# Patient Record
Sex: MALE | Race: WHITE | Employment: FULL TIME | URBAN - METROPOLITAN AREA
[De-identification: names, ages, dates, MRNs, and addresses within clinical notes are randomized per-mention and may not be internally consistent; named-entity substitution may affect disease eponyms.]

---

## 2017-01-27 ENCOUNTER — OFFICE VISIT (OUTPATIENT)
Dept: FAMILY MEDICINE CLINIC | Facility: CLINIC | Age: 57
End: 2017-01-27

## 2017-01-27 VITALS
RESPIRATION RATE: 14 BRPM | OXYGEN SATURATION: 98 % | TEMPERATURE: 98 F | BODY MASS INDEX: 32 KG/M2 | HEART RATE: 68 BPM | WEIGHT: 236.38 LBS | DIASTOLIC BLOOD PRESSURE: 72 MMHG | SYSTOLIC BLOOD PRESSURE: 122 MMHG

## 2017-01-27 DIAGNOSIS — J31.0 RHINOSINUSITIS: Primary | ICD-10-CM

## 2017-01-27 DIAGNOSIS — J32.9 RHINOSINUSITIS: Primary | ICD-10-CM

## 2017-01-27 DIAGNOSIS — R43.0 ANOSMIA: ICD-10-CM

## 2017-01-27 PROCEDURE — 99214 OFFICE O/P EST MOD 30 MIN: CPT | Performed by: FAMILY MEDICINE

## 2017-01-27 RX ORDER — METHYLPREDNISOLONE 4 MG/1
TABLET ORAL
Qty: 1 KIT | Refills: 0 | Status: SHIPPED | OUTPATIENT
Start: 2017-01-27 | End: 2017-05-09

## 2017-01-27 RX ORDER — FLUTICASONE PROPIONATE 50 MCG
2 SPRAY, SUSPENSION (ML) NASAL DAILY
Qty: 1 BOTTLE | Refills: 1 | Status: SHIPPED | OUTPATIENT
Start: 2017-01-27 | End: 2017-08-01 | Stop reason: ALTCHOICE

## 2017-01-27 NOTE — PROGRESS NOTES
CC: congestion    HPI:     Location nasal  Quality pressure, drainage  Severity moderate  Duration 6 weeks  Timing constant  Context has been on treatment including abx previously  Associated symptoms tired    ROS: no fever or chills, no vomiting or diarrh

## 2017-04-28 RX ORDER — LISINOPRIL 40 MG/1
TABLET ORAL
Qty: 90 TABLET | Refills: 1 | Status: SHIPPED | OUTPATIENT
Start: 2017-04-28 | End: 2017-08-01

## 2017-04-28 NOTE — TELEPHONE ENCOUNTER
LOV: 1/27/17 for rhinosinusitis  BP at time of visit: 122/72      LISINOPRIL 40 MG Oral Tab 90 tablet 1 4/9/2016      Sig :  TAKE 1 TABLET BY MOUTH ONCE DAILY.       Patient taking differently :  TAKE 1 TABLET BY MOUTH ONCE DAILY.    Taking 20 mg daily

## 2017-05-09 PROCEDURE — 86480 TB TEST CELL IMMUN MEASURE: CPT | Performed by: DERMATOLOGY

## 2017-07-24 ENCOUNTER — NURSE ONLY (OUTPATIENT)
Dept: FAMILY MEDICINE CLINIC | Facility: CLINIC | Age: 57
End: 2017-07-24

## 2017-07-24 ENCOUNTER — TELEPHONE (OUTPATIENT)
Dept: FAMILY MEDICINE CLINIC | Facility: CLINIC | Age: 57
End: 2017-07-24

## 2017-07-24 DIAGNOSIS — Z00.00 ANNUAL PHYSICAL EXAM: Primary | ICD-10-CM

## 2017-07-24 DIAGNOSIS — Z00.00 GENERAL MEDICAL EXAM: ICD-10-CM

## 2017-07-24 DIAGNOSIS — Z00.00 GENERAL MEDICAL EXAM: Primary | ICD-10-CM

## 2017-07-24 PROCEDURE — 36415 COLL VENOUS BLD VENIPUNCTURE: CPT | Performed by: FAMILY MEDICINE

## 2017-07-24 PROCEDURE — 83036 HEMOGLOBIN GLYCOSYLATED A1C: CPT | Performed by: FAMILY MEDICINE

## 2017-07-24 PROCEDURE — 80050 GENERAL HEALTH PANEL: CPT | Performed by: FAMILY MEDICINE

## 2017-07-24 PROCEDURE — 84153 ASSAY OF PSA TOTAL: CPT | Performed by: FAMILY MEDICINE

## 2017-07-24 PROCEDURE — 80061 LIPID PANEL: CPT | Performed by: FAMILY MEDICINE

## 2017-07-24 NOTE — TELEPHONE ENCOUNTER
PT HAS AN APPOINTMENT NEXT Tuesday FOR FOLLOW Up    And wants to know if he can come before to have labs done

## 2017-07-24 NOTE — TELEPHONE ENCOUNTER
Patient advised lab orders placed. Nurse appointment scheduled.   Future Appointments  Date Time Provider Juan Moore   8/1/2017 5:15 PM Eugene Garcia DO EMGOSW [facility] [place]   8/8/2017 4:00 PM [facility] DERM SUTURE REMOVAL [facility]    11/7/2017 4:

## 2017-07-25 LAB
ALBUMIN SERPL-MCNC: 3.9 G/DL (ref 3.5–4.8)
ALP LIVER SERPL-CCNC: 52 U/L (ref 45–117)
ALT SERPL-CCNC: 30 U/L (ref 17–63)
AST SERPL-CCNC: 22 U/L (ref 15–41)
BASOPHILS # BLD AUTO: 0.04 X10(3) UL (ref 0–0.1)
BASOPHILS NFR BLD AUTO: 0.5 %
BILIRUB SERPL-MCNC: 1.2 MG/DL (ref 0.1–2)
BUN BLD-MCNC: 10 MG/DL (ref 8–20)
CALCIUM BLD-MCNC: 8.6 MG/DL (ref 8.3–10.3)
CHLORIDE: 106 MMOL/L (ref 101–111)
CHOLEST SMN-MCNC: 101 MG/DL (ref ?–200)
CO2: 25 MMOL/L (ref 22–32)
COMPLEXED PSA SERPL-MCNC: 1.82 NG/ML (ref 0.01–4)
CREAT BLD-MCNC: 1 MG/DL (ref 0.7–1.3)
CREAT UR-SCNC: 56.5 MG/DL
EOSINOPHIL # BLD AUTO: 0.06 X10(3) UL (ref 0–0.3)
EOSINOPHIL NFR BLD AUTO: 0.8 %
ERYTHROCYTE [DISTWIDTH] IN BLOOD BY AUTOMATED COUNT: 12.3 % (ref 11.5–16)
EST. AVERAGE GLUCOSE BLD GHB EST-MCNC: 111 MG/DL (ref 68–126)
GLUCOSE BLD-MCNC: 72 MG/DL (ref 70–99)
HBA1C MFR BLD HPLC: 5.5 % (ref ?–5.7)
HCT VFR BLD AUTO: 37.9 % (ref 37–53)
HDLC SERPL-MCNC: 29 MG/DL (ref 45–?)
HDLC SERPL: 3.48 {RATIO} (ref ?–4.97)
HGB BLD-MCNC: 12.8 G/DL (ref 13–17)
IMMATURE GRANULOCYTE COUNT: 0.04 X10(3) UL (ref 0–1)
IMMATURE GRANULOCYTE RATIO %: 0.5 %
LDLC SERPL CALC-MCNC: 60 MG/DL (ref ?–130)
LDLC SERPL-MCNC: 12 MG/DL (ref 5–40)
LYMPHOCYTES # BLD AUTO: 2.03 X10(3) UL (ref 0.9–4)
LYMPHOCYTES NFR BLD AUTO: 26.7 %
M PROTEIN MFR SERPL ELPH: 7 G/DL (ref 6.1–8.3)
MCH RBC QN AUTO: 28.1 PG (ref 27–33.2)
MCHC RBC AUTO-ENTMCNC: 33.8 G/DL (ref 31–37)
MCV RBC AUTO: 83.3 FL (ref 80–99)
MICROALBUMIN UR-MCNC: 0.8 MG/DL
MICROALBUMIN/CREAT 24H UR-RTO: 14.2 UG/MG (ref ?–30)
MONOCYTES # BLD AUTO: 0.63 X10(3) UL (ref 0.1–0.6)
MONOCYTES NFR BLD AUTO: 8.3 %
NEUTROPHIL ABS PRELIM: 4.8 X10 (3) UL (ref 1.3–6.7)
NEUTROPHILS # BLD AUTO: 4.8 X10(3) UL (ref 1.3–6.7)
NEUTROPHILS NFR BLD AUTO: 63.2 %
NONHDLC SERPL-MCNC: 72 MG/DL (ref ?–130)
PLATELET # BLD AUTO: 343 10(3)UL (ref 150–450)
POTASSIUM SERPL-SCNC: 3.6 MMOL/L (ref 3.6–5.1)
RBC # BLD AUTO: 4.55 X10(6)UL (ref 4.3–5.7)
RED CELL DISTRIBUTION WIDTH-SD: 37.4 FL (ref 35.1–46.3)
SODIUM SERPL-SCNC: 139 MMOL/L (ref 136–144)
TRIGLYCERIDES: 60 MG/DL (ref ?–150)
TSI SER-ACNC: 1.46 MIU/ML (ref 0.35–5.5)
WBC # BLD AUTO: 7.6 X10(3) UL (ref 4–13)

## 2017-07-25 PROCEDURE — 82570 ASSAY OF URINE CREATININE: CPT | Performed by: FAMILY MEDICINE

## 2017-07-25 PROCEDURE — 82043 UR ALBUMIN QUANTITATIVE: CPT | Performed by: FAMILY MEDICINE

## 2017-08-01 ENCOUNTER — OFFICE VISIT (OUTPATIENT)
Dept: FAMILY MEDICINE CLINIC | Facility: CLINIC | Age: 57
End: 2017-08-01

## 2017-08-01 VITALS
OXYGEN SATURATION: 98 % | SYSTOLIC BLOOD PRESSURE: 116 MMHG | HEIGHT: 72 IN | DIASTOLIC BLOOD PRESSURE: 74 MMHG | TEMPERATURE: 98 F | HEART RATE: 96 BPM | WEIGHT: 233 LBS | BODY MASS INDEX: 31.56 KG/M2 | RESPIRATION RATE: 14 BRPM

## 2017-08-01 DIAGNOSIS — Z12.11 COLON CANCER SCREENING: ICD-10-CM

## 2017-08-01 DIAGNOSIS — I10 ESSENTIAL HYPERTENSION WITH GOAL BLOOD PRESSURE LESS THAN 140/90: Primary | ICD-10-CM

## 2017-08-01 PROCEDURE — 99213 OFFICE O/P EST LOW 20 MIN: CPT | Performed by: FAMILY MEDICINE

## 2017-08-01 RX ORDER — LISINOPRIL 40 MG/1
20 TABLET ORAL
Qty: 90 TABLET | Refills: 1 | COMMUNITY
Start: 2017-08-01 | End: 2018-05-09

## 2018-03-09 ENCOUNTER — PATIENT OUTREACH (OUTPATIENT)
Dept: FAMILY MEDICINE CLINIC | Facility: CLINIC | Age: 58
End: 2018-03-09

## 2018-03-15 ENCOUNTER — TELEPHONE (OUTPATIENT)
Dept: FAMILY MEDICINE CLINIC | Facility: CLINIC | Age: 58
End: 2018-03-15

## 2018-03-15 NOTE — TELEPHONE ENCOUNTER
Received health guide notification from patient's pharmacy stating he has not filled his lisinopril. Patient states he is taking this medication daily.

## 2018-05-08 PROCEDURE — 86480 TB TEST CELL IMMUN MEASURE: CPT | Performed by: DERMATOLOGY

## 2018-05-09 NOTE — TELEPHONE ENCOUNTER
lisinopril 40 MG Oral Tab 90 tablet 1 8/1/2017     Last labs 07/24/17. Last seen on 08/01/17. cc: BP check /74.   Future Appointments  Date Time Provider Juan Moore   5/15/2018 4:00 PM [facility] DERM SUTURE REMOVAL [facility]    5/23/2018 3:1

## 2018-05-10 RX ORDER — LISINOPRIL 40 MG/1
TABLET ORAL
Qty: 90 TABLET | Refills: 0 | Status: SHIPPED | OUTPATIENT
Start: 2018-05-10 | End: 2018-11-15 | Stop reason: DRUGHIGH

## 2018-06-27 ENCOUNTER — HOSPITAL ENCOUNTER (OUTPATIENT)
Dept: MRI IMAGING | Age: 58
Discharge: HOME | End: 2018-06-27
Attending: OTOLARYNGOLOGY
Payer: COMMERCIAL

## 2018-06-27 ENCOUNTER — TELEPHONE (OUTPATIENT)
Dept: FAMILY MEDICINE CLINIC | Facility: CLINIC | Age: 58
End: 2018-06-27

## 2018-06-27 DIAGNOSIS — J32.9 CHRONIC SINUSITIS, UNSPECIFIED LOCATION: ICD-10-CM

## 2018-06-27 DIAGNOSIS — R43.0 ANOSMIA: ICD-10-CM

## 2018-06-27 LAB — CREAT SERPL-MCNC: 1.3 MG/DL (ref 0.7–1.3)

## 2018-06-27 PROCEDURE — 70553 MRI BRAIN STEM W/O & W/DYE: CPT | Performed by: OTOLARYNGOLOGY

## 2018-06-27 PROCEDURE — A9576 INJ PROHANCE MULTIPACK: HCPCS | Performed by: OTOLARYNGOLOGY

## 2018-06-27 PROCEDURE — 82565 ASSAY OF CREATININE: CPT

## 2018-06-27 NOTE — TELEPHONE ENCOUNTER
Marie Isabel called, he received a call from our automated system stating he was due for a follow up with Dr. Obdulia Allen. He just had an MRI of the brain so he would like to get that issue resolved before he schedules follow up.   He will call back to schedule

## 2018-06-27 NOTE — TELEPHONE ENCOUNTER
LOV 08/01/17 /74  due for BP follow up. Last labs 07/24/17  Due for annual px    Pt states he just had an MRI of the brain and he would like to resolved this issue first and then follow up with Dr. Cris Olivo. Please advise.

## 2018-10-04 ENCOUNTER — PATIENT OUTREACH (OUTPATIENT)
Dept: FAMILY MEDICINE CLINIC | Facility: CLINIC | Age: 58
End: 2018-10-04

## 2018-11-06 RX ORDER — LISINOPRIL 40 MG/1
TABLET ORAL
Qty: 90 TABLET | Refills: 0 | OUTPATIENT
Start: 2018-11-06

## 2018-11-15 ENCOUNTER — OFFICE VISIT (OUTPATIENT)
Dept: FAMILY MEDICINE CLINIC | Facility: CLINIC | Age: 58
End: 2018-11-15
Payer: COMMERCIAL

## 2018-11-15 VITALS
TEMPERATURE: 98 F | BODY MASS INDEX: 33.6 KG/M2 | HEART RATE: 82 BPM | OXYGEN SATURATION: 98 % | RESPIRATION RATE: 18 BRPM | HEIGHT: 70.7 IN | SYSTOLIC BLOOD PRESSURE: 132 MMHG | WEIGHT: 240 LBS | DIASTOLIC BLOOD PRESSURE: 88 MMHG

## 2018-11-15 DIAGNOSIS — Z00.00 GENERAL MEDICAL EXAM: Primary | ICD-10-CM

## 2018-11-15 DIAGNOSIS — I10 ESSENTIAL HYPERTENSION WITH GOAL BLOOD PRESSURE LESS THAN 140/90: ICD-10-CM

## 2018-11-15 PROCEDURE — 36415 COLL VENOUS BLD VENIPUNCTURE: CPT | Performed by: FAMILY MEDICINE

## 2018-11-15 PROCEDURE — 83036 HEMOGLOBIN GLYCOSYLATED A1C: CPT | Performed by: FAMILY MEDICINE

## 2018-11-15 PROCEDURE — 99213 OFFICE O/P EST LOW 20 MIN: CPT | Performed by: FAMILY MEDICINE

## 2018-11-15 PROCEDURE — 82306 VITAMIN D 25 HYDROXY: CPT | Performed by: FAMILY MEDICINE

## 2018-11-15 PROCEDURE — 80061 LIPID PANEL: CPT | Performed by: FAMILY MEDICINE

## 2018-11-15 PROCEDURE — 84153 ASSAY OF PSA TOTAL: CPT | Performed by: FAMILY MEDICINE

## 2018-11-15 PROCEDURE — 80050 GENERAL HEALTH PANEL: CPT | Performed by: FAMILY MEDICINE

## 2018-11-15 RX ORDER — LISINOPRIL 20 MG/1
20 TABLET ORAL DAILY
Qty: 90 TABLET | Refills: 1 | Status: SHIPPED | OUTPATIENT
Start: 2018-11-15 | End: 2019-05-16

## 2018-11-15 NOTE — PROGRESS NOTES
CC: bp meds check    HPI:     Pt due for blood pressure check. He has been taking lisinopril 20 mg. Abnormal glucose: diet and exercise controlled.      ROS: has lost weight and kept it off      Current Outpatient Medications:  lisinopril 20 MG Oral T

## 2018-11-17 ENCOUNTER — TELEPHONE (OUTPATIENT)
Dept: FAMILY MEDICINE CLINIC | Facility: CLINIC | Age: 58
End: 2018-11-17

## 2018-11-17 NOTE — TELEPHONE ENCOUNTER
Per my chart message appointment needed for lab follow up. Appointment scheduled.   Future Appointments   Date Time Provider Juan Hairstoni   11/20/2018  3:00 PM DO CESAR NguyenOSW [facility] [place]   11/21/2018  3:00 PM DO EMELINA PreciadoUR [facility]

## 2018-11-20 ENCOUNTER — OFFICE VISIT (OUTPATIENT)
Dept: FAMILY MEDICINE CLINIC | Facility: CLINIC | Age: 58
End: 2018-11-20
Payer: COMMERCIAL

## 2018-11-20 VITALS
BODY MASS INDEX: 33 KG/M2 | OXYGEN SATURATION: 97 % | WEIGHT: 238 LBS | DIASTOLIC BLOOD PRESSURE: 70 MMHG | SYSTOLIC BLOOD PRESSURE: 120 MMHG | TEMPERATURE: 97 F | HEART RATE: 102 BPM | RESPIRATION RATE: 16 BRPM

## 2018-11-20 DIAGNOSIS — R74.8 ELEVATED LIVER ENZYMES: Primary | ICD-10-CM

## 2018-11-20 PROCEDURE — 82977 ASSAY OF GGT: CPT | Performed by: FAMILY MEDICINE

## 2018-11-20 PROCEDURE — 99213 OFFICE O/P EST LOW 20 MIN: CPT | Performed by: FAMILY MEDICINE

## 2018-11-20 PROCEDURE — 84460 ALANINE AMINO (ALT) (SGPT): CPT | Performed by: FAMILY MEDICINE

## 2018-11-20 PROCEDURE — 36415 COLL VENOUS BLD VENIPUNCTURE: CPT | Performed by: FAMILY MEDICINE

## 2018-11-20 PROCEDURE — 82550 ASSAY OF CK (CPK): CPT | Performed by: FAMILY MEDICINE

## 2018-11-20 PROCEDURE — 84450 TRANSFERASE (AST) (SGOT): CPT | Performed by: FAMILY MEDICINE

## 2018-11-20 NOTE — PROGRESS NOTES
CC: elevated liver enzymes    HPI:     Elevated liver enzymes. No changes other than new very vigorous exercise prior to measure.      ROS: no abd pain    EXAM:   /70   Pulse 102   Temp 97.1 °F (36.2 °C) (Temporal)   Resp 16   Wt 238 lb   SpO2 97%   B

## 2018-11-21 ENCOUNTER — OFFICE VISIT (OUTPATIENT)
Dept: SURGERY | Facility: CLINIC | Age: 58
End: 2018-11-21
Payer: COMMERCIAL

## 2018-11-21 VITALS
TEMPERATURE: 97 F | DIASTOLIC BLOOD PRESSURE: 80 MMHG | HEART RATE: 86 BPM | BODY MASS INDEX: 31.83 KG/M2 | HEIGHT: 72 IN | WEIGHT: 235 LBS | SYSTOLIC BLOOD PRESSURE: 123 MMHG

## 2018-11-21 DIAGNOSIS — R19.4 CHANGE IN BOWEL HABITS: Primary | ICD-10-CM

## 2018-11-21 PROCEDURE — 99244 OFF/OP CNSLTJ NEW/EST MOD 40: CPT | Performed by: SURGERY

## 2018-11-21 RX ORDER — POLYETHYLENE GLYCOL 3350, SODIUM CHLORIDE, SODIUM BICARBONATE, POTASSIUM CHLORIDE 420; 11.2; 5.72; 1.48 G/4L; G/4L; G/4L; G/4L
POWDER, FOR SOLUTION ORAL
Qty: 1 BOTTLE | Refills: 0 | Status: SHIPPED | OUTPATIENT
Start: 2018-11-21 | End: 2019-02-15

## 2018-11-21 NOTE — H&P
New Patient Visit Note       Active Problems      No diagnosis found. Chief Complaint   Patient presents with:  Colonoscopy: New Pt. Colonoscopy consult. Pt denies pain/discomfort at this time.        History of Present Illness     Pt presents for first Solution Prefilled Syringe injection INJECT 90 MG SUBCUTANEOUSLY EVERY 12 WEEKS Disp: 1 Syringe Rfl: 1         Review of Systems  The Review of Systems has been reviewed by me during today.   Review of Systems   Constitutional: Negative for chills, diaphore No dominant lesions noted. Lymphatic. No cervical, supraclavicular, or inguinal lymphadenopathy.           Assessment   Change in bowel habits  Average risk for colon cancer    Plan   Colonoscopy discussed with patient risk of bleeding and bowel perforati

## 2018-12-13 ENCOUNTER — TELEPHONE (OUTPATIENT)
Dept: SURGERY | Facility: CLINIC | Age: 58
End: 2018-12-13

## 2018-12-13 ENCOUNTER — ANESTHESIA EVENT (OUTPATIENT)
Dept: ENDOSCOPY | Facility: HOSPITAL | Age: 58
End: 2018-12-13

## 2018-12-14 ENCOUNTER — ANESTHESIA (OUTPATIENT)
Dept: ENDOSCOPY | Facility: HOSPITAL | Age: 58
End: 2018-12-14

## 2018-12-14 ENCOUNTER — HOSPITAL ENCOUNTER (OUTPATIENT)
Facility: HOSPITAL | Age: 58
Setting detail: HOSPITAL OUTPATIENT SURGERY
Discharge: HOME | End: 2018-12-14
Attending: SURGERY | Admitting: SURGERY
Payer: COMMERCIAL

## 2018-12-14 VITALS
SYSTOLIC BLOOD PRESSURE: 97 MMHG | OXYGEN SATURATION: 100 % | BODY MASS INDEX: 31.15 KG/M2 | DIASTOLIC BLOOD PRESSURE: 64 MMHG | HEART RATE: 76 BPM | HEIGHT: 72 IN | RESPIRATION RATE: 18 BRPM | WEIGHT: 230 LBS | TEMPERATURE: 98 F

## 2018-12-14 DIAGNOSIS — Z12.11 SCREEN FOR COLON CANCER: ICD-10-CM

## 2018-12-14 PROCEDURE — 88305 TISSUE EXAM BY PATHOLOGIST: CPT | Performed by: SURGERY

## 2018-12-14 PROCEDURE — 0DBH8ZX EXCISION OF CECUM, VIA NATURAL OR ARTIFICIAL OPENING ENDOSCOPIC, DIAGNOSTIC: ICD-10-PCS | Performed by: SURGERY

## 2018-12-14 RX ORDER — SODIUM CHLORIDE, SODIUM LACTATE, POTASSIUM CHLORIDE, CALCIUM CHLORIDE 600; 310; 30; 20 MG/100ML; MG/100ML; MG/100ML; MG/100ML
INJECTION, SOLUTION INTRAVENOUS CONTINUOUS
Status: DISCONTINUED | OUTPATIENT
Start: 2018-12-14 | End: 2018-12-14

## 2018-12-14 RX ORDER — NALOXONE HYDROCHLORIDE 0.4 MG/ML
80 INJECTION, SOLUTION INTRAMUSCULAR; INTRAVENOUS; SUBCUTANEOUS AS NEEDED
Status: DISCONTINUED | OUTPATIENT
Start: 2018-12-14 | End: 2018-12-14

## 2018-12-14 RX ORDER — DEXTROSE MONOHYDRATE 25 G/50ML
50 INJECTION, SOLUTION INTRAVENOUS
Status: DISCONTINUED | OUTPATIENT
Start: 2018-12-14 | End: 2018-12-14

## 2018-12-14 NOTE — ANESTHESIA POSTPROCEDURE EVALUATION
[facility] Odessa Slider Patient Status:  Hospital Outpatient Surgery   Age/Gender 66year old male MRN [id number]   Location [address] [facility]. Attending Lance Agee, [address] [facility] Day # 0 PCP Gabriela Barriga DO       Anesthesia Post-op

## 2018-12-14 NOTE — ANESTHESIA PREPROCEDURE EVALUATION
PRE-OP EVALUATION    Patient Name: Karla Del Cid    Pre-op Diagnosis: Screen for colon cancer [Z12.11]    Procedure(s):  COLONOSCOPY      Surgeon(s) and Role:     Charo Loja, DO - Primary    Pre-op vitals reviewed.   Temp: 98 °F (36.7 °C)  Puls .0 11/15/2018     Lab Results   Component Value Date     11/15/2018    K 4.1 11/15/2018     11/15/2018    CO2 27.0 11/15/2018    BUN 20 11/15/2018    CREATSERUM 1.10 11/15/2018    GLU 67 (L) 11/15/2018    CA 9.2 11/15/2018

## 2018-12-14 NOTE — H&P
Pt presents for first colonoscopy consult   Denies family ho of colon cancer or polyps   Denies blood   Has had a change in bowel movements over the past 10 years  Chronic abd pain none  Patient has history of anal fistulotomy performed by Dr. Denise Cardona man Constitutional: Negative for chills, diaphoresis, fatigue, fever and unexpected weight change. HENT: Negative for hearing loss, nosebleeds, sore throat and trouble swallowing. Respiratory: Negative for apnea, cough, shortness of breath and wheezing. discussed with patient risk of bleeding and bowel perforation with surgery to repair.

## 2018-12-14 NOTE — ANESTHESIA PREPROCEDURE EVALUATION
PRE-OP EVALUATION    Patient Name: Sheyla Slider    Pre-op Diagnosis: Screen for colon cancer [Z12.11]    Procedure(s):  COLONOSCOPY      Surgeon(s) and Role:     Vahe Sanon, DO - Primary    Pre-op vitals reviewed.   Temp: 98 °F (36.7 °C)  Puls MCHC 33.2 11/15/2018    RDW 11.9 11/15/2018    .0 11/15/2018     Lab Results   Component Value Date     11/15/2018    K 4.1 11/15/2018     11/15/2018    CO2 27.0 11/15/2018    BUN 20 11/15/2018    CREATSERUM 1.10 11/15/2018    GLU 67 (L)

## 2018-12-18 NOTE — OPERATIVE REPORT
[facility]    PATIENT'S NAME: Roberto RICK   ATTENDING PHYSICIAN: Roxy Cochran D.O.   OPERATING PHYSICIAN: Roxy Cochran D.O.   PATIENT ACCOUNT#:   [de-identified]    LOCATION:  [facility] ENDO [place] ROOMS [address] [facility] [address] #:   [id number]

## 2019-01-02 ENCOUNTER — PATIENT OUTREACH (OUTPATIENT)
Dept: SURGERY | Facility: CLINIC | Age: 59
End: 2019-01-02

## 2019-02-09 ENCOUNTER — OFFICE VISIT (OUTPATIENT)
Dept: FAMILY MEDICINE CLINIC | Facility: CLINIC | Age: 59
End: 2019-02-09
Payer: COMMERCIAL

## 2019-02-09 VITALS
HEART RATE: 95 BPM | OXYGEN SATURATION: 96 % | HEIGHT: 72 IN | RESPIRATION RATE: 20 BRPM | TEMPERATURE: 98 F | SYSTOLIC BLOOD PRESSURE: 106 MMHG | WEIGHT: 235 LBS | BODY MASS INDEX: 31.83 KG/M2 | DIASTOLIC BLOOD PRESSURE: 68 MMHG

## 2019-02-09 DIAGNOSIS — J20.9 BRONCHITIS WITH BRONCHOSPASM: Primary | ICD-10-CM

## 2019-02-09 PROCEDURE — 94640 AIRWAY INHALATION TREATMENT: CPT | Performed by: FAMILY MEDICINE

## 2019-02-09 PROCEDURE — 99214 OFFICE O/P EST MOD 30 MIN: CPT | Performed by: FAMILY MEDICINE

## 2019-02-09 RX ORDER — ALBUTEROL SULFATE 2.5 MG/3ML
2.5 SOLUTION RESPIRATORY (INHALATION) ONCE
Status: COMPLETED | OUTPATIENT
Start: 2019-02-09 | End: 2019-02-09

## 2019-02-09 RX ORDER — PREDNISONE 20 MG/1
20 TABLET ORAL 2 TIMES DAILY
Qty: 10 TABLET | Refills: 0 | Status: SHIPPED | OUTPATIENT
Start: 2019-02-09 | End: 2019-02-14

## 2019-02-09 RX ORDER — ALBUTEROL SULFATE 90 UG/1
2 AEROSOL, METERED RESPIRATORY (INHALATION) 3 TIMES DAILY
Qty: 1 INHALER | Refills: 0 | Status: SHIPPED | OUTPATIENT
Start: 2019-02-09 | End: 2019-02-15

## 2019-02-09 RX ADMIN — ALBUTEROL SULFATE 2.5 MG: 2.5 SOLUTION RESPIRATORY (INHALATION) at 11:05:00

## 2019-02-09 NOTE — PROGRESS NOTES
HPI:    Patient ID: Lindsay Mccarthy is a 66year old male. Patient presents with:  Cough    HPI  Cough for 4 days. Constant. Triggered by talking, deep breath, cold air  Dry cough. Mostly during daytime.   No chest tightness or SOB  Nasal congestion height 72\", weight 235 lb, SpO2 96 %.            ASSESSMENT/PLAN:   Bronchitis with bronchospasm  (primary encounter diagnosis)  Start prednisone  Start albuterol MDI  Call if not better in next 7-10 days  RTC if sx persist or worsen  No orders of the defi

## 2019-02-15 ENCOUNTER — HOSPITAL ENCOUNTER (OUTPATIENT)
Dept: GENERAL RADIOLOGY | Age: 59
Discharge: HOME | End: 2019-02-15
Attending: FAMILY MEDICINE
Payer: COMMERCIAL

## 2019-02-15 ENCOUNTER — OFFICE VISIT (OUTPATIENT)
Dept: FAMILY MEDICINE CLINIC | Facility: CLINIC | Age: 59
End: 2019-02-15
Payer: COMMERCIAL

## 2019-02-15 VITALS
WEIGHT: 234 LBS | HEIGHT: 72 IN | SYSTOLIC BLOOD PRESSURE: 120 MMHG | RESPIRATION RATE: 18 BRPM | DIASTOLIC BLOOD PRESSURE: 82 MMHG | BODY MASS INDEX: 31.69 KG/M2 | TEMPERATURE: 98 F | OXYGEN SATURATION: 98 % | HEART RATE: 76 BPM

## 2019-02-15 DIAGNOSIS — J20.9 BRONCHITIS WITH BRONCHOSPASM: ICD-10-CM

## 2019-02-15 DIAGNOSIS — J20.9 BRONCHITIS WITH BRONCHOSPASM: Primary | ICD-10-CM

## 2019-02-15 PROCEDURE — 99213 OFFICE O/P EST LOW 20 MIN: CPT | Performed by: FAMILY MEDICINE

## 2019-02-15 PROCEDURE — 71046 X-RAY EXAM CHEST 2 VIEWS: CPT | Performed by: FAMILY MEDICINE

## 2019-02-15 RX ORDER — AZITHROMYCIN 250 MG/1
TABLET, FILM COATED ORAL
Qty: 6 TABLET | Refills: 0 | Status: SHIPPED | OUTPATIENT
Start: 2019-02-15 | End: 2019-05-16

## 2019-02-15 NOTE — PROGRESS NOTES
CC: cough    HPI:     Pt having cough for about 10 days. Has been treated with oral steroid and inhaler. Not resolving.      ROS: no fever, no sputum or hemoptysis    Past Medical History:   Diagnosis Date   • Acute bronchitis 03/08/2011   • Acute bronchosp

## 2019-05-07 PROCEDURE — 36415 COLL VENOUS BLD VENIPUNCTURE: CPT | Performed by: DERMATOLOGY

## 2019-05-07 PROCEDURE — 86480 TB TEST CELL IMMUN MEASURE: CPT | Performed by: DERMATOLOGY

## 2019-05-16 ENCOUNTER — OFFICE VISIT (OUTPATIENT)
Dept: FAMILY MEDICINE CLINIC | Facility: CLINIC | Age: 59
End: 2019-05-16
Payer: COMMERCIAL

## 2019-05-16 VITALS
WEIGHT: 227 LBS | RESPIRATION RATE: 18 BRPM | TEMPERATURE: 99 F | BODY MASS INDEX: 30.75 KG/M2 | OXYGEN SATURATION: 99 % | HEART RATE: 90 BPM | SYSTOLIC BLOOD PRESSURE: 124 MMHG | HEIGHT: 72 IN | DIASTOLIC BLOOD PRESSURE: 72 MMHG

## 2019-05-16 DIAGNOSIS — I10 ESSENTIAL HYPERTENSION WITH GOAL BLOOD PRESSURE LESS THAN 140/90: Primary | ICD-10-CM

## 2019-05-16 DIAGNOSIS — Z00.00 GENERAL MEDICAL EXAM: ICD-10-CM

## 2019-05-16 DIAGNOSIS — L40.8 OTHER PSORIASIS: ICD-10-CM

## 2019-05-16 PROCEDURE — 83036 HEMOGLOBIN GLYCOSYLATED A1C: CPT | Performed by: FAMILY MEDICINE

## 2019-05-16 PROCEDURE — 84153 ASSAY OF PSA TOTAL: CPT | Performed by: FAMILY MEDICINE

## 2019-05-16 PROCEDURE — 80050 GENERAL HEALTH PANEL: CPT | Performed by: FAMILY MEDICINE

## 2019-05-16 PROCEDURE — 82306 VITAMIN D 25 HYDROXY: CPT | Performed by: FAMILY MEDICINE

## 2019-05-16 PROCEDURE — 99213 OFFICE O/P EST LOW 20 MIN: CPT | Performed by: FAMILY MEDICINE

## 2019-05-16 PROCEDURE — 80061 LIPID PANEL: CPT | Performed by: FAMILY MEDICINE

## 2019-05-16 RX ORDER — MULTIVIT WITH MINERALS/LUTEIN
1000 TABLET ORAL DAILY
COMMUNITY

## 2019-05-16 RX ORDER — LISINOPRIL 20 MG/1
20 TABLET ORAL DAILY
Qty: 90 TABLET | Refills: 1 | Status: SHIPPED | OUTPATIENT
Start: 2019-05-16 | End: 2019-11-13

## 2019-05-16 NOTE — PROGRESS NOTES
CC: meds check    HPI:     BP: chronic stable    Psoriasis: stable    ROS: no cp or sob    Past Medical History:   Diagnosis Date   • Acute bronchitis 03/08/2011   • Acute bronchospasm 03/08/2011   • Anxiety state, unspecified    • Chest pain, unspecified W Reflex To Free T4      VITAMIN D, 25-HYDROXY      PSA (Screening) [E]      Meds & Refills for this Visit:  Requested Prescriptions      No prescriptions requested or ordered in this encounter       Imaging & Consults:  None

## 2019-08-28 ENCOUNTER — OFFICE VISIT (OUTPATIENT)
Dept: FAMILY MEDICINE CLINIC | Facility: CLINIC | Age: 59
End: 2019-08-28
Payer: COMMERCIAL

## 2019-08-28 ENCOUNTER — TELEPHONE (OUTPATIENT)
Dept: FAMILY MEDICINE CLINIC | Facility: CLINIC | Age: 59
End: 2019-08-28

## 2019-08-28 VITALS
DIASTOLIC BLOOD PRESSURE: 62 MMHG | WEIGHT: 226 LBS | HEIGHT: 72 IN | HEART RATE: 96 BPM | RESPIRATION RATE: 20 BRPM | OXYGEN SATURATION: 97 % | TEMPERATURE: 98 F | BODY MASS INDEX: 30.61 KG/M2 | SYSTOLIC BLOOD PRESSURE: 100 MMHG

## 2019-08-28 DIAGNOSIS — H60.332 ACUTE SWIMMER'S EAR OF LEFT SIDE: ICD-10-CM

## 2019-08-28 DIAGNOSIS — H66.92 LEFT OTITIS MEDIA, UNSPECIFIED OTITIS MEDIA TYPE: Primary | ICD-10-CM

## 2019-08-28 PROCEDURE — 99213 OFFICE O/P EST LOW 20 MIN: CPT | Performed by: FAMILY MEDICINE

## 2019-08-28 RX ORDER — AMOXICILLIN 875 MG/1
875 TABLET, COATED ORAL 2 TIMES DAILY
Qty: 20 TABLET | Refills: 0 | Status: SHIPPED | OUTPATIENT
Start: 2019-08-28 | End: 2019-09-07

## 2019-08-28 RX ORDER — CIPROFLOXACIN AND DEXAMETHASONE 3; 1 MG/ML; MG/ML
4 SUSPENSION/ DROPS AURICULAR (OTIC) 2 TIMES DAILY
Qty: 1 BOTTLE | Refills: 0 | Status: SHIPPED | OUTPATIENT
Start: 2019-08-28 | End: 2019-08-28 | Stop reason: ALTCHOICE

## 2019-08-28 RX ORDER — NEOMYCIN SULFATE, POLYMYXIN B SULFATE AND HYDROCORTISONE 10; 3.5; 1 MG/ML; MG/ML; [USP'U]/ML
4 SUSPENSION/ DROPS AURICULAR (OTIC) 3 TIMES DAILY
Qty: 1 BOTTLE | Refills: 0 | Status: SHIPPED | OUTPATIENT
Start: 2019-08-28 | End: 2019-09-04

## 2019-08-28 NOTE — PROGRESS NOTES
Patient presents with:  Ear Pain: left ear pain        HPI:    Patient ID: Sheyla Damon is a 64year old male. Left ear pain for 1 week  Worsening. Throbbing pain. Feels clogged. Review of Systems   Constitutional: Negative for fever.    HENT Smoking status: Never Smoker      Smokeless tobacco: Never Used      Tobacco comment: non-smoker    Alcohol use: No      Alcohol/week: 0.0 standard drinks    Drug use: No       PHYSICAL EXAM:    /62 (BP Location: Left arm, Patient Position: Sitting,

## 2019-09-24 ENCOUNTER — TELEPHONE (OUTPATIENT)
Dept: FAMILY MEDICINE CLINIC | Facility: CLINIC | Age: 59
End: 2019-09-24

## 2019-09-24 NOTE — TELEPHONE ENCOUNTER
Patient may see Dr. Gema Lainez tomorrow or Dr. Tiera Ludwig tomorrow. Appointment scheduled.    Future Appointments   Date Time Provider Juan Moore   9/25/2019  4:40 PM Jeromy Siddiqi MD [facility]   11/5/2019  3:50 PM Alexei Wood MD [facility]

## 2019-09-24 NOTE — TELEPHONE ENCOUNTER
Phone call from patient. States he thinks that he has bronchitis. Symptoms started last Monday with coughing, wheezing. Not short of breath. Cough is non productive. No fever. Has not tried anything OTC.

## 2019-09-25 ENCOUNTER — OFFICE VISIT (OUTPATIENT)
Dept: FAMILY MEDICINE CLINIC | Facility: CLINIC | Age: 59
End: 2019-09-25
Payer: COMMERCIAL

## 2019-09-25 VITALS
RESPIRATION RATE: 18 BRPM | OXYGEN SATURATION: 96 % | DIASTOLIC BLOOD PRESSURE: 70 MMHG | BODY MASS INDEX: 30 KG/M2 | TEMPERATURE: 98 F | HEART RATE: 108 BPM | SYSTOLIC BLOOD PRESSURE: 110 MMHG | WEIGHT: 224.19 LBS

## 2019-09-25 DIAGNOSIS — J40 BRONCHITIS: Primary | ICD-10-CM

## 2019-09-25 PROCEDURE — 99213 OFFICE O/P EST LOW 20 MIN: CPT | Performed by: FAMILY MEDICINE

## 2019-09-25 NOTE — PROGRESS NOTES
Patient presents with:  Cough: 10 days       HPI:    Patient ID: Yesi Brown is a 64year old male. HPI     URTI symptoms since   Symptoms include  Cough - last week 10 days. Night cough dry with some sputum.    Fever -no  Sinus pressure -no   N Smokeless tobacco: Never Used      Tobacco comment: non-smoker    Alcohol use: No      Alcohol/week: 0.0 standard drinks    Drug use: No       PHYSICAL EXAM:    /70   Pulse 108   Temp 97.7 °F (36.5 °C)   Resp 18   Wt 224 lb 3.2 oz   SpO2 96%   BMI 30

## 2019-11-19 RX ORDER — LISINOPRIL 20 MG/1
TABLET ORAL
Qty: 90 TABLET | Refills: 0 | Status: SHIPPED | OUTPATIENT
Start: 2019-11-19 | End: 2020-02-17

## 2019-11-21 RX ORDER — LISINOPRIL 20 MG/1
TABLET ORAL
Qty: 90 TABLET | Refills: 0 | OUTPATIENT
Start: 2019-11-21

## 2019-11-21 NOTE — TELEPHONE ENCOUNTER
[facility] pharmacy[facility]          Will file in chart as: LISINOPRIL 20 MG Oral Tab    The source prescription was reordered on 11/19/2019 by Brendan Best DO.      Request refused-just filled

## 2019-11-22 RX ORDER — LISINOPRIL 20 MG/1
20 TABLET ORAL
Qty: 90 TABLET | Refills: 0 | OUTPATIENT
Start: 2019-11-22

## 2020-02-17 RX ORDER — LISINOPRIL 20 MG/1
TABLET ORAL
Qty: 30 TABLET | Refills: 0 | Status: SHIPPED | OUTPATIENT
Start: 2020-02-17 | End: 2020-02-18

## 2020-02-17 NOTE — TELEPHONE ENCOUNTER
Medication Quantity Refills Start End   LISINOPRIL 20 MG Oral Tab 90 tablet 0 11/19/2019      Last OV 9/25/19 for bronchitis; /70  Last CMP 5/16/19; creatinine was 1.26   Future Appointments   Date Time Provider Juan Moore   5/5/2020  4:10 PM

## 2020-02-18 ENCOUNTER — TELEPHONE (OUTPATIENT)
Dept: FAMILY MEDICINE CLINIC | Facility: CLINIC | Age: 60
End: 2020-02-18

## 2020-02-18 RX ORDER — LISINOPRIL 20 MG/1
TABLET ORAL
Qty: 90 TABLET | Refills: 0 | Status: SHIPPED | OUTPATIENT
Start: 2020-02-18 | End: 2020-03-17

## 2020-02-18 NOTE — TELEPHONE ENCOUNTER
Hypertension Medications Protocol Passed2/18 11:34 AM   CMP or BMP in past 12 months    Last serum creatinine< 2.0    Appointment in past 6 or next 3 months     Refill send 2/17/20 for 30 tablets. Requesting 90. Ok to send?

## 2020-03-17 RX ORDER — LISINOPRIL 20 MG/1
20 TABLET ORAL
Qty: 30 TABLET | Refills: 0 | Status: SHIPPED | OUTPATIENT
Start: 2020-03-17 | End: 2020-06-15

## 2020-03-17 NOTE — TELEPHONE ENCOUNTER
Medication Quantity Refills Start End   LISINOPRIL 20 MG Oral Tab 90 tablet 0 2/18/2020      Spoke to [facility]. Patient picked up 30 tablets on 2/20/20. Last OV 9/25/19 for bronchitis. Last CMP 5/16/19.  Creatinine was 1.26  Future Appointments   Date Jose Swain

## 2020-03-17 NOTE — TELEPHONE ENCOUNTER
LISINOPRIL 20 MG Oral Tab [[id number]  Pt has about 5 days left, ok until Dr gets back.   Uses [facility] [address]

## 2020-03-17 NOTE — TELEPHONE ENCOUNTER
Tried to call patient on cell to let patient know lisinopril was sent to pharmacy. Unable to leave voicemail since no voicemail set up.

## 2020-06-15 RX ORDER — LISINOPRIL 20 MG/1
TABLET ORAL
Qty: 30 TABLET | Refills: 0 | Status: SHIPPED | OUTPATIENT
Start: 2020-06-15 | End: 2020-07-13

## 2020-06-15 NOTE — TELEPHONE ENCOUNTER
Hypertension Medications Protocol Failed6/15 3:37 AM   CMP or BMP in past 12 months    Appointment in past 6 or next 3 months    Last serum creatinine< 2.0     Last refill - 3/17/20 - #30   Last CMP - 5/16/19 - creatinine - 1.26  Last office visit - 9/25/1

## 2020-06-25 ENCOUNTER — OFFICE VISIT (OUTPATIENT)
Dept: FAMILY MEDICINE CLINIC | Facility: CLINIC | Age: 60
End: 2020-06-25
Payer: COMMERCIAL

## 2020-06-25 VITALS
TEMPERATURE: 99 F | RESPIRATION RATE: 17 BRPM | WEIGHT: 230.63 LBS | HEIGHT: 70.5 IN | SYSTOLIC BLOOD PRESSURE: 110 MMHG | HEART RATE: 92 BPM | OXYGEN SATURATION: 98 % | DIASTOLIC BLOOD PRESSURE: 70 MMHG | BODY MASS INDEX: 32.65 KG/M2

## 2020-06-25 DIAGNOSIS — Z00.00 GENERAL MEDICAL EXAM: Primary | ICD-10-CM

## 2020-06-25 DIAGNOSIS — R73.03 PRE-DIABETES: ICD-10-CM

## 2020-06-25 PROCEDURE — 80050 GENERAL HEALTH PANEL: CPT | Performed by: FAMILY MEDICINE

## 2020-06-25 PROCEDURE — 84153 ASSAY OF PSA TOTAL: CPT | Performed by: FAMILY MEDICINE

## 2020-06-25 PROCEDURE — 83036 HEMOGLOBIN GLYCOSYLATED A1C: CPT | Performed by: FAMILY MEDICINE

## 2020-06-25 PROCEDURE — 82306 VITAMIN D 25 HYDROXY: CPT | Performed by: FAMILY MEDICINE

## 2020-06-25 PROCEDURE — 80061 LIPID PANEL: CPT | Performed by: FAMILY MEDICINE

## 2020-06-25 PROCEDURE — 99396 PREV VISIT EST AGE 40-64: CPT | Performed by: FAMILY MEDICINE

## 2020-06-25 NOTE — PROGRESS NOTES
Yesi Brown is a 64year old male who presents for a complete physical exam.   HPI:   Pt generally doing well.        Immunization History  Administered            Date(s) Administered    TDAP                  06/17/2014      Tb Intradermal Test Outpatient Medications   Medication Sig Dispense Refill   • LISINOPRIL 20 MG Oral Tab TAKE ONE TABLET BY MOUTH ONE TIME DAILY  30 tablet 0   • ustekinumab (STELARA) 90 MG/ML Subcutaneous Solution Prefilled Syringe injection INJECT ONE PREFILLED SYRINGE UND anemia  ENDOCRINE: denies thyroid history  ALL/ASTHMA: denies hx of allergy or asthma    EXAM:   /70 (BP Location: Left arm, Patient Position: Sitting, Cuff Size: adult)   Pulse 92   Temp 98.5 °F (36.9 °C) (Temporal)   Resp 17   Ht 70.5\"   Wt 230 lb

## 2020-06-26 ENCOUNTER — TELEPHONE (OUTPATIENT)
Dept: FAMILY MEDICINE CLINIC | Facility: CLINIC | Age: 60
End: 2020-06-26

## 2020-06-26 DIAGNOSIS — R73.03 PRE-DIABETES: Primary | ICD-10-CM

## 2020-06-26 NOTE — TELEPHONE ENCOUNTER
Atul Saleem, DO Joe De Santiago, AGUILA             Please add hemoglobin A1c onto the patient's labs drawn yesterday.  It should have been ordered yesterday not sure why it was not.    Jose Raul

## 2020-07-13 RX ORDER — LISINOPRIL 20 MG/1
TABLET ORAL
Qty: 90 TABLET | Refills: 0 | Status: SHIPPED | OUTPATIENT
Start: 2020-07-13 | End: 2020-08-07

## 2020-07-13 NOTE — TELEPHONE ENCOUNTER
LOV 6/25/20 for a general exam.  BP: 110/70    LISINOPRIL 20 MG Oral Tab 30 tablet 0 6/15/2020    Sig:   TAKE ONE TABLET BY MOUTH ONE TIME DAILY      Route:   (none)       No future appointments.    Rx filled per passed protocol:  Hypertension Medications P

## 2020-08-07 RX ORDER — LISINOPRIL 20 MG/1
TABLET ORAL
Qty: 30 TABLET | Refills: 0 | Status: SHIPPED | OUTPATIENT
Start: 2020-08-07 | End: 2020-09-14

## 2020-08-07 NOTE — TELEPHONE ENCOUNTER
LOV: 6/25/20 general medical exam    LISINOPRIL 20 MG Oral Tab 90 tablet 0 7/13/2020    Sig:   TAKE ONE TABLET BY MOUTH ONE TIME DAILY      Route:   (none)         Hypertension Medications Protocol Passed8/7 3:37 AM   CMP or BMP in past 12 months    Last s

## 2020-08-11 ENCOUNTER — OFFICE VISIT (OUTPATIENT)
Dept: FAMILY MEDICINE CLINIC | Facility: CLINIC | Age: 60
End: 2020-08-11
Payer: COMMERCIAL

## 2020-08-11 VITALS
HEART RATE: 89 BPM | RESPIRATION RATE: 16 BRPM | HEIGHT: 70.5 IN | SYSTOLIC BLOOD PRESSURE: 126 MMHG | TEMPERATURE: 97 F | BODY MASS INDEX: 33.41 KG/M2 | WEIGHT: 236 LBS | OXYGEN SATURATION: 98 % | DIASTOLIC BLOOD PRESSURE: 80 MMHG

## 2020-08-11 DIAGNOSIS — H60.502 ACUTE OTITIS EXTERNA OF LEFT EAR, UNSPECIFIED TYPE: ICD-10-CM

## 2020-08-11 DIAGNOSIS — H66.92 LEFT OTITIS MEDIA, UNSPECIFIED OTITIS MEDIA TYPE: Primary | ICD-10-CM

## 2020-08-11 PROCEDURE — 3079F DIAST BP 80-89 MM HG: CPT | Performed by: FAMILY MEDICINE

## 2020-08-11 PROCEDURE — 3074F SYST BP LT 130 MM HG: CPT | Performed by: FAMILY MEDICINE

## 2020-08-11 PROCEDURE — 99213 OFFICE O/P EST LOW 20 MIN: CPT | Performed by: FAMILY MEDICINE

## 2020-08-11 PROCEDURE — 3008F BODY MASS INDEX DOCD: CPT | Performed by: FAMILY MEDICINE

## 2020-08-11 RX ORDER — AMOXICILLIN AND CLAVULANATE POTASSIUM 875; 125 MG/1; MG/1
1 TABLET, FILM COATED ORAL 2 TIMES DAILY
Qty: 20 TABLET | Refills: 0 | Status: SHIPPED | OUTPATIENT
Start: 2020-08-11 | End: 2020-08-21

## 2020-08-11 NOTE — PROGRESS NOTES
Patient presents with:  Ear Pain: Lt x 1 week       HPI:    Patient ID: Griselda So is a 64year old male. HPI   Patient is here with left ear pain for 1 wk. He reports it was severe and he started neosporin ear drop which did help him. no disch Grandfather with Lung cancer   • Stroke Other         Grandfather       Social History: Social History    Tobacco Use      Smoking status: Never Smoker      Smokeless tobacco: Never Used      Tobacco comment: non-smoker    Alcohol use: No      Alcohol/

## 2020-09-14 ENCOUNTER — TELEPHONE (OUTPATIENT)
Dept: FAMILY MEDICINE CLINIC | Facility: CLINIC | Age: 60
End: 2020-09-14

## 2020-09-14 RX ORDER — LISINOPRIL 20 MG/1
20 TABLET ORAL DAILY
Qty: 90 TABLET | Refills: 0 | Status: SHIPPED | OUTPATIENT
Start: 2020-09-14 | End: 2020-12-16

## 2020-09-14 NOTE — TELEPHONE ENCOUNTER
LOV with SC on 8/11/20 for L otitis media. BP: 126/80    LISINOPRIL 20 MG Oral Tab 30 tablet 0 8/7/2020    Sig:   TAKE ONE TABLET BY MOUTH ONE TIME DAILY      Route:   (none)       No future appointments. Rx filled.

## 2020-12-16 RX ORDER — LISINOPRIL 20 MG/1
20 TABLET ORAL DAILY
Qty: 90 TABLET | Refills: 0 | Status: SHIPPED | OUTPATIENT
Start: 2020-12-16 | End: 2021-03-13

## 2020-12-16 RX ORDER — LISINOPRIL 20 MG/1
20 TABLET ORAL DAILY
Qty: 90 TABLET | Refills: 0 | Status: SHIPPED | OUTPATIENT
Start: 2020-12-16 | End: 2020-12-16

## 2020-12-16 NOTE — TELEPHONE ENCOUNTER
Hypertension Medications Protocol [id number]/16/2020 12:48 PM   CMP or BMP in past 12 months Protocol Details    Last serum creatinine< 2.0           Last OV 8/11/20  Last CMP 6/25/20  Last refill 9/14/20  Refill sent

## 2021-03-13 RX ORDER — LISINOPRIL 20 MG/1
TABLET ORAL
Qty: 90 TABLET | Refills: 0 | Status: SHIPPED | OUTPATIENT
Start: 2021-03-13 | End: 2021-06-14

## 2021-06-01 ENCOUNTER — TELEMEDICINE (OUTPATIENT)
Dept: FAMILY MEDICINE CLINIC | Facility: CLINIC | Age: 61
End: 2021-06-01

## 2021-06-01 DIAGNOSIS — J01.00 ACUTE MAXILLARY SINUSITIS, RECURRENCE NOT SPECIFIED: Primary | ICD-10-CM

## 2021-06-01 PROCEDURE — 99214 OFFICE O/P EST MOD 30 MIN: CPT | Performed by: FAMILY MEDICINE

## 2021-06-01 RX ORDER — AZITHROMYCIN 250 MG/1
TABLET, FILM COATED ORAL
Qty: 6 TABLET | Refills: 0 | Status: SHIPPED | OUTPATIENT
Start: 2021-06-01 | End: 2021-06-06

## 2021-06-01 NOTE — PROGRESS NOTES
No chief complaint on file. cough  This visit is conducted using Telemedicine with live, interactive video and audio. Patient has been referred to the [facility] website at [email or web address] to review the yearly Consent to Treat document.     Patient Date   • COLONOSCOPY N/A 12/14/2018    Procedure: COLONOSCOPY;  Surgeon: Ashlee King DO;  Location: [facility] ENDOSCOPY      Family History   Problem Relation Age of Onset   • Heart Disease Father    • Cancer Other         Grandfather with Lung cancer   • Str

## 2021-06-04 ENCOUNTER — TELEPHONE (OUTPATIENT)
Dept: FAMILY MEDICINE CLINIC | Facility: CLINIC | Age: 61
End: 2021-06-04

## 2021-06-04 ENCOUNTER — LAB ENCOUNTER (OUTPATIENT)
Dept: LAB | Age: 61
End: 2021-06-04
Attending: FAMILY MEDICINE
Payer: COMMERCIAL

## 2021-06-04 DIAGNOSIS — J02.9 SORE THROAT: ICD-10-CM

## 2021-06-04 DIAGNOSIS — R05.9 COUGH: ICD-10-CM

## 2021-06-04 DIAGNOSIS — R05.9 COUGH: Primary | ICD-10-CM

## 2021-06-04 NOTE — TELEPHONE ENCOUNTER
Pt would like to have an order placed for him to take a covid test. saw Dr. Kevin Garcia on Tuesday and was told if he wanted to take the test to just call in

## 2021-06-05 ENCOUNTER — TELEPHONE (OUTPATIENT)
Dept: FAMILY MEDICINE CLINIC | Facility: CLINIC | Age: 61
End: 2021-06-05

## 2021-06-05 NOTE — TELEPHONE ENCOUNTER
MARIAN    Patient advised and verbalized understanding. Patient's symptoms improving -feeling much better  Patient still has slight cough  Nasal discharge with congestion. No fever  Denies SOB/difficulty breathing.

## 2021-06-05 NOTE — TELEPHONE ENCOUNTER
----- Message from Veronique Wilson MD sent at 6/5/2021  9:40 AM CDT -----  Please inform covid test neg. How are his symptoms. ?

## 2021-06-14 RX ORDER — LISINOPRIL 20 MG/1
TABLET ORAL
Qty: 90 TABLET | Refills: 0 | Status: SHIPPED | OUTPATIENT
Start: 2021-06-14 | End: 2021-09-14

## 2021-08-10 PROBLEM — M17.11 PRIMARY OSTEOARTHRITIS OF RIGHT KNEE: Status: ACTIVE | Noted: 2021-08-10

## 2021-08-10 PROBLEM — M17.12 PRIMARY OSTEOARTHRITIS OF LEFT KNEE: Status: ACTIVE | Noted: 2021-08-10

## 2021-09-15 NOTE — TELEPHONE ENCOUNTER
LOV 8/11/20. Televisit 6/1/21 for sinus pain. [facility] sent - due for physical.    Future Appointments   Date Time Provider Juan Moore   9/27/2021  5:15 PM Mata White, PT, DPT, Cert. MDT [facility]   10/5/2021  9:45 AM Mata White, PT, DPT,

## 2021-09-16 RX ORDER — LISINOPRIL 20 MG/1
20 TABLET ORAL DAILY
Qty: 90 TABLET | Refills: 0 | Status: SHIPPED | OUTPATIENT
Start: 2021-09-16 | End: 2021-12-11

## 2021-09-16 NOTE — TELEPHONE ENCOUNTER
Med refilled  Future Appointments   Date Time Provider Juan Moore   9/24/2021  8:00 AM Katherine Finney MD EMGOSW [facility] POST ACUTE [facility]ICAL [facility]   9/27/2021  5:15 PM Jael Benavides, PT, DPT, Cert. MDT PF [facility]   10/5/2021  9:45 AM Jael Benavides, PT, DPT, Cert. Jez Karloo

## 2021-09-21 ENCOUNTER — TELEPHONE (OUTPATIENT)
Dept: PHYSICAL THERAPY | Facility: HOSPITAL | Age: 61
End: 2021-09-21

## 2021-09-24 ENCOUNTER — OFFICE VISIT (OUTPATIENT)
Dept: FAMILY MEDICINE CLINIC | Facility: CLINIC | Age: 61
End: 2021-09-24
Payer: COMMERCIAL

## 2021-09-24 VITALS
TEMPERATURE: 98 F | BODY MASS INDEX: 33.64 KG/M2 | OXYGEN SATURATION: 98 % | SYSTOLIC BLOOD PRESSURE: 130 MMHG | HEIGHT: 70 IN | RESPIRATION RATE: 18 BRPM | WEIGHT: 235 LBS | DIASTOLIC BLOOD PRESSURE: 80 MMHG | HEART RATE: 84 BPM

## 2021-09-24 DIAGNOSIS — Z12.5 PROSTATE CANCER SCREENING: ICD-10-CM

## 2021-09-24 DIAGNOSIS — Z00.00 ANNUAL PHYSICAL EXAM: Primary | ICD-10-CM

## 2021-09-24 LAB
ALBUMIN SERPL-MCNC: 4 G/DL (ref 3.4–5)
ALBUMIN/GLOB SERPL: 1 {RATIO} (ref 1–2)
ALP LIVER SERPL-CCNC: 79 U/L
ALT SERPL-CCNC: 32 U/L
ANION GAP SERPL CALC-SCNC: 6 MMOL/L (ref 0–18)
AST SERPL-CCNC: 20 U/L (ref 15–37)
BASOPHILS # BLD AUTO: 0.03 X10(3) UL (ref 0–0.2)
BASOPHILS NFR BLD AUTO: 0.6 %
BILIRUB SERPL-MCNC: 1.4 MG/DL (ref 0.1–2)
BUN BLD-MCNC: 25 MG/DL (ref 7–18)
CALCIUM BLD-MCNC: 9.4 MG/DL (ref 8.5–10.1)
CHLORIDE SERPL-SCNC: 108 MMOL/L (ref 98–112)
CHOLEST SERPL-MCNC: 198 MG/DL (ref ?–200)
CO2 SERPL-SCNC: 22 MMOL/L (ref 21–32)
COMPLEXED PSA SERPL-MCNC: 2.22 NG/ML (ref ?–4)
CREAT BLD-MCNC: 1.17 MG/DL
EOSINOPHIL # BLD AUTO: 0.08 X10(3) UL (ref 0–0.7)
EOSINOPHIL NFR BLD AUTO: 1.6 %
ERYTHROCYTE [DISTWIDTH] IN BLOOD BY AUTOMATED COUNT: 12.7 %
EST. AVERAGE GLUCOSE BLD GHB EST-MCNC: 131 MG/DL (ref 68–126)
GLOBULIN PLAS-MCNC: 3.9 G/DL (ref 2.8–4.4)
GLUCOSE BLD-MCNC: 109 MG/DL (ref 70–99)
HBA1C MFR BLD HPLC: 6.2 % (ref ?–5.7)
HCT VFR BLD AUTO: 41.8 %
HDLC SERPL-MCNC: 40 MG/DL (ref 40–59)
HGB BLD-MCNC: 14.1 G/DL
IMM GRANULOCYTES # BLD AUTO: 0.04 X10(3) UL (ref 0–1)
IMM GRANULOCYTES NFR BLD: 0.8 %
LDLC SERPL CALC-MCNC: 138 MG/DL (ref ?–100)
LYMPHOCYTES # BLD AUTO: 1.62 X10(3) UL (ref 1–4)
LYMPHOCYTES NFR BLD AUTO: 32 %
MCH RBC QN AUTO: 29 PG (ref 26–34)
MCHC RBC AUTO-ENTMCNC: 33.7 G/DL (ref 31–37)
MCV RBC AUTO: 85.8 FL
MONOCYTES # BLD AUTO: 0.48 X10(3) UL (ref 0.1–1)
MONOCYTES NFR BLD AUTO: 9.5 %
NEUTROPHILS # BLD AUTO: 2.81 X10 (3) UL (ref 1.5–7.7)
NEUTROPHILS # BLD AUTO: 2.81 X10(3) UL (ref 1.5–7.7)
NEUTROPHILS NFR BLD AUTO: 55.5 %
NONHDLC SERPL-MCNC: 158 MG/DL (ref ?–130)
OSMOLALITY SERPL CALC.SUM OF ELEC: 287 MOSM/KG (ref 275–295)
PATIENT FASTING Y/N/NP: YES
PATIENT FASTING Y/N/NP: YES
PLATELET # BLD AUTO: 369 10(3)UL (ref 150–450)
POTASSIUM SERPL-SCNC: 4.5 MMOL/L (ref 3.5–5.1)
PROT SERPL-MCNC: 7.9 G/DL (ref 6.4–8.2)
RBC # BLD AUTO: 4.87 X10(6)UL
SODIUM SERPL-SCNC: 136 MMOL/L (ref 136–145)
TRIGL SERPL-MCNC: 108 MG/DL (ref 30–149)
TSI SER-ACNC: 1.92 MIU/ML (ref 0.36–3.74)
VLDLC SERPL CALC-MCNC: 20 MG/DL (ref 0–30)
WBC # BLD AUTO: 5.1 X10(3) UL (ref 4–11)

## 2021-09-24 PROCEDURE — 3079F DIAST BP 80-89 MM HG: CPT | Performed by: FAMILY MEDICINE

## 2021-09-24 PROCEDURE — 99396 PREV VISIT EST AGE 40-64: CPT | Performed by: FAMILY MEDICINE

## 2021-09-24 PROCEDURE — 84153 ASSAY OF PSA TOTAL: CPT | Performed by: FAMILY MEDICINE

## 2021-09-24 PROCEDURE — 80050 GENERAL HEALTH PANEL: CPT | Performed by: FAMILY MEDICINE

## 2021-09-24 PROCEDURE — 83036 HEMOGLOBIN GLYCOSYLATED A1C: CPT | Performed by: FAMILY MEDICINE

## 2021-09-24 PROCEDURE — 80061 LIPID PANEL: CPT | Performed by: FAMILY MEDICINE

## 2021-09-24 PROCEDURE — 3075F SYST BP GE 130 - 139MM HG: CPT | Performed by: FAMILY MEDICINE

## 2021-09-24 PROCEDURE — 3008F BODY MASS INDEX DOCD: CPT | Performed by: FAMILY MEDICINE

## 2021-09-24 NOTE — PROGRESS NOTES
Sheyla Damon is a 63year old male who presents for a complete physical exam.   HPI:   No concerns today. Colonoscopy 2018 DR. Bushra Fitch. Next 2023 in 5 yrs. Osteoarthritis- seeing osteo and start PT.  On pain meds  Cardiac scoring done at [facility] 10 yrs 1000 MG Oral Tab Take 1,000 mg by mouth daily.      • STELARA 90 MG/ML Subcutaneous Solution Prefilled Syringe injection INJECT ONE PREFILLED SYRINGE UNDER THE SKIN  ONCE EVERY 12 WEEKS 1 mL 0      Past Medical History:   Diagnosis Date   • Acute bronchitis well nourished,in no apparent distress  SKIN: no rashes  HEENT: ears and throat are clear  EYES:PERRLA, EOMI, normal optic disk,conjunctiva are clear  NECK: supple,no adenopathy,no bruits.  No thyromegaly  BREAST: no dominant or suspicious mass  LUNGS: beryl

## 2021-09-24 NOTE — PATIENT INSTRUCTIONS
Exercise for a Healthier Heart     Exercise with a friend. When activity is fun, you're more likely to stick with it. You may wonder how you can improve the health of your heart. If you’re thinking about exercise, you’re on the right track.  You don’t n you enjoy. Walking is one of the easiest things you can do. You can also try swimming, riding a bike, dancing, or taking an exercise class.   Stop exercising and call your doctor if you:  · Have chest pain or feel dizzy or lightheaded  · Feel burning, tight molasses. Cut your usual amount by half. · Use non-sugar sweeteners. Stevia, aspartame, and sucralose can satisfy a sweet tooth without adding calories. · Swap out sugar-filled soda and other drinks. Buy sugar-free or low-calorie beverages.  Remember yolanda your food with herbs and flavorings. Try lemon, garlic, and onion, or salt-free herb seasonings. · Limit convenience foods, such as boxed or canned foods and restaurant food. · Read food labels and choose lower-sodium options. Step 3.  Limit sugar  ·

## 2021-09-25 ENCOUNTER — TELEPHONE (OUTPATIENT)
Dept: FAMILY MEDICINE CLINIC | Facility: CLINIC | Age: 61
End: 2021-09-25

## 2021-09-25 NOTE — TELEPHONE ENCOUNTER
----- Message from Conner Shahid MD sent at 9/25/2021  8:11 AM CDT -----  Please inform hemoglobin A1c worsened from 5.8-6.2, cholesterol LDL elevated  Thyroid blood count liver and kidney functions are normal.  Encouraged to continue low-carb low-fat di

## 2021-09-25 NOTE — TELEPHONE ENCOUNTER
----- Message from Anyi Diop sent at 9/25/2021  9:22 AM CDT -----  Regarding: test results  Pt returned nurse call.   Ph. [phone]

## 2021-09-27 ENCOUNTER — OFFICE VISIT (OUTPATIENT)
Dept: PHYSICAL THERAPY | Age: 61
End: 2021-09-27
Attending: ORTHOPAEDIC SURGERY
Payer: COMMERCIAL

## 2021-09-27 DIAGNOSIS — M17.11 PRIMARY OSTEOARTHRITIS OF RIGHT KNEE: ICD-10-CM

## 2021-09-27 DIAGNOSIS — M17.12 PRIMARY OSTEOARTHRITIS OF LEFT KNEE: ICD-10-CM

## 2021-09-27 PROCEDURE — 97110 THERAPEUTIC EXERCISES: CPT

## 2021-09-27 PROCEDURE — 97162 PT EVAL MOD COMPLEX 30 MIN: CPT

## 2021-09-27 NOTE — PROGRESS NOTES
LOWER EXTREMITY EVALUATION:   Referring Physician: Dr. Napoleon Avalos DX:  Primary osteoarthritis of right knee (M17.11)  Primary osteoarthritis of left knee (M17.12)          PATIENT SUMMARY   Sharyle Flatness is a 63year old y/o male who pres Latonya Weinberg is a 63year old year old male who presents to physical therapy with bilateral knee pain (right >left).  The results of the objective tests and measures show decreased lower extremity range of motion, decreased lower extremity strength and dec home activities such as walking on uneven surfaces such as grass  · Pt will be independent and compliant with comprehensive HEP to maintain progress achieved in PT   · Pt will improve knee extension ROM to 0 deg to allow proper heel strike during gait and

## 2021-09-29 ENCOUNTER — OFFICE VISIT (OUTPATIENT)
Dept: PHYSICAL THERAPY | Age: 61
End: 2021-09-29
Attending: ORTHOPAEDIC SURGERY
Payer: COMMERCIAL

## 2021-09-29 PROCEDURE — 97110 THERAPEUTIC EXERCISES: CPT

## 2021-09-29 NOTE — PROGRESS NOTES
Associated DX:  Primary osteoarthritis of right knee (M17.11)  Primary osteoarthritis of left knee (M17.12)    Insurance (Authorized # of Visits):  46 [facility] Physician: Dr. Dorys De Leon Next MD visit: none scheduled  Fall Risk: standa

## 2021-10-05 ENCOUNTER — OFFICE VISIT (OUTPATIENT)
Dept: PHYSICAL THERAPY | Age: 61
End: 2021-10-05
Attending: ORTHOPAEDIC SURGERY
Payer: COMMERCIAL

## 2021-10-05 PROCEDURE — 97110 THERAPEUTIC EXERCISES: CPT

## 2021-10-07 ENCOUNTER — OFFICE VISIT (OUTPATIENT)
Dept: PHYSICAL THERAPY | Age: 61
End: 2021-10-07
Attending: ORTHOPAEDIC SURGERY
Payer: COMMERCIAL

## 2021-10-07 PROCEDURE — 97110 THERAPEUTIC EXERCISES: CPT

## 2021-10-07 NOTE — PROGRESS NOTES
Associated DX:  Primary osteoarthritis of right knee (M17.11)  Primary osteoarthritis of left knee (M17.12)    Insurance (Authorized # of Visits):  [facility] Physician: Dr. Myrla Gilford Next MD visit: none scheduled  Fall Risk: standa avoid toe out positions when lifting.     Goals:   · Pt will report <1/10 pain with work and home activities such as walking on uneven surfaces such as grass  · Pt will be independent and compliant with comprehensive HEP to maintain progress achieved in PT

## 2021-10-12 ENCOUNTER — OFFICE VISIT (OUTPATIENT)
Dept: PHYSICAL THERAPY | Age: 61
End: 2021-10-12
Attending: ORTHOPAEDIC SURGERY
Payer: COMMERCIAL

## 2021-10-12 PROCEDURE — 97110 THERAPEUTIC EXERCISES: CPT

## 2021-10-12 NOTE — PROGRESS NOTES
Associated DX:  Primary osteoarthritis of right knee (M17.11)  Primary osteoarthritis of left knee (M17.12)    Insurance (Authorized # of Visits):  46 [facility] Physician: Dr. Eder Fortune Next MD visit: none scheduled  Fall Risk: standa B  Single leg balance R 20, 16 seconds; L 4, 14 seconds   HEP: knee extension in seated with overpressure x 10 (4 times a day); quad set x 20 (2 times a day)  10/05/21 blue band TKE 5 second hold x 20 B, sidelying hip abduction, sit to stand with band arou

## 2021-10-14 ENCOUNTER — OFFICE VISIT (OUTPATIENT)
Dept: PHYSICAL THERAPY | Age: 61
End: 2021-10-14
Attending: ORTHOPAEDIC SURGERY
Payer: COMMERCIAL

## 2021-10-14 PROCEDURE — 97110 THERAPEUTIC EXERCISES: CPT

## 2021-10-14 NOTE — PROGRESS NOTES
Associated DX:  Primary osteoarthritis of right knee (M17.11)  Primary osteoarthritis of left knee (M17.12)    Insurance (Authorized # of Visits):  46 [facility] Physician: Dr. Dirk Garcia Next MD visit: none scheduled  Fall Risk: standa minutes  Knee extension in seated with overpressure x 10 B  Sidelying hip abduction - red band x 30 B  TKE with black band 5 second hold - eccentric x 20  Leg press (3 blue bands) x 20  Side stepping (green)  2 x 15  Reverse toe tap 4 inch step x 20 B  Run

## 2021-10-19 ENCOUNTER — OFFICE VISIT (OUTPATIENT)
Dept: PHYSICAL THERAPY | Age: 61
End: 2021-10-19
Attending: ORTHOPAEDIC SURGERY
Payer: COMMERCIAL

## 2021-10-19 PROCEDURE — 97110 THERAPEUTIC EXERCISES: CPT

## 2021-10-19 NOTE — PROGRESS NOTES
Associated DX:  Primary osteoarthritis of right knee (M17.11)  Primary osteoarthritis of left knee (M17.12)    Insurance (Authorized # of Visits):  Leisa Ragland Physician: Dr. Brianne Kennedy Next MD visit: none scheduled  Fall Risk: standa seconds; L 4, 14 seconds TherEx:  LE bike level 8 x 5 minutes  Knee extension in seated with overpressure x 10 B  Sidelying hip abduction - red band x 30 B  TKE with black band 5 second hold - eccentric x 20  Leg press (3 blue bands) x 20  Side stepping (g to grossly 4+/5 to be able to get up and down from the floor safely. Plan: Re-assess next session and continue with bilateral knee ROM, strengthening, stability, balance, gait, proprioceptive exercises, patient education, and HEP progression.       Reina Chavez

## 2021-10-21 ENCOUNTER — OFFICE VISIT (OUTPATIENT)
Dept: PHYSICAL THERAPY | Age: 61
End: 2021-10-21
Attending: ORTHOPAEDIC SURGERY
Payer: COMMERCIAL

## 2021-10-21 PROCEDURE — 97112 NEUROMUSCULAR REEDUCATION: CPT

## 2021-10-21 PROCEDURE — 97110 THERAPEUTIC EXERCISES: CPT

## 2021-10-21 PROCEDURE — 97116 GAIT TRAINING THERAPY: CPT

## 2021-10-21 NOTE — PROGRESS NOTES
PROGRESS NOTE    Associated DX:  Primary osteoarthritis of right knee (M17.11)  Primary osteoarthritis of left knee (M17.12)    Insurance (Authorized # of Visits):  46 [facility] Physician: Dr. Debbie Gill MD visit: none scheduled step x 20 B TherEx:  LE bike level 8 x 5 minutes  Knee extension in seated with overpressure x 10 B  Sidelying hip abduction x 30 B  TKE with black band 5 second hold x 20  Leg press (3 blue bands) x 20  Side stepping (green)  2 x 15  Lateral heel tap 6 in strengthening 10/12/21 single leg standing 10/14/21 eccentric TKE 10/19/21 side stepping with band, single leg on pillow 10/21/21       Goals:  All goals in progress  · Pt will report <1/10 pain with work and home activities such as walking on uneven surfac

## 2021-10-26 ENCOUNTER — OFFICE VISIT (OUTPATIENT)
Dept: PHYSICAL THERAPY | Age: 61
End: 2021-10-26
Attending: ORTHOPAEDIC SURGERY
Payer: COMMERCIAL

## 2021-10-26 PROCEDURE — 97530 THERAPEUTIC ACTIVITIES: CPT

## 2021-10-26 PROCEDURE — 97110 THERAPEUTIC EXERCISES: CPT

## 2021-10-26 NOTE — PROGRESS NOTES
Associated DX:  Primary osteoarthritis of right knee (M17.11)  Primary osteoarthritis of left knee (M17.12)    Insurance (Authorized # of Visits):  46 [facility] Physician: Dr. Aminah Dowd Next MD visit: none scheduled  Fall Risk: standa step x 20 B  Running man isometric (yellow ball) 10 second hold x 10 B  Heel raises x 20  Single leg balance R 20 seconds; L 24 seconds TherEx:  LE bike level 8 x 5 minutes  Knee extension in seated with overpressure x 10 B  TKE with black band 5 second ho flexion in chair. Goals:  All goals in progress  · Pt will report <1/10 pain with work and home activities such as walking on uneven surfaces such as grass  · Pt will be independent and compliant with comprehensive HEP to maintain progress achieved in PT

## 2021-10-28 ENCOUNTER — OFFICE VISIT (OUTPATIENT)
Dept: PHYSICAL THERAPY | Age: 61
End: 2021-10-28
Attending: ORTHOPAEDIC SURGERY
Payer: COMMERCIAL

## 2021-10-28 PROCEDURE — 97110 THERAPEUTIC EXERCISES: CPT

## 2021-10-28 NOTE — PROGRESS NOTES
Associated DX:  Primary osteoarthritis of right knee (M17.11)  Primary osteoarthritis of left knee (M17.12)    Insurance (Authorized # of Visits):  46 [facility] Physician: Dr. Eugenio Flores Next MD visit: none scheduled  Fall Risk: standa seated with overpressure x 10 B  TKE with black band 5 second hold - eccentric x 20  Leg press (3 blue + 1 grey bands) x 20  Side stepping (blue )  2 x 30 feet   Monster walking 2 x 30feet  Reverse toe tap 4 inch step x 10 B  Forward heel tap 4 inch x 10 B standing 10/14/21 eccentric TKE 10/19/21 side stepping with band, single leg on pillow 10/26/21 knee flexion with foot on chair ( 2 times a day) 10/28/21 patellar mobilization  Assessment: Patient demonstrates decreased patellar mobility.  Instructed patien

## 2021-11-02 ENCOUNTER — APPOINTMENT (OUTPATIENT)
Dept: PHYSICAL THERAPY | Age: 61
End: 2021-11-02
Attending: ORTHOPAEDIC SURGERY
Payer: COMMERCIAL

## 2021-11-04 ENCOUNTER — APPOINTMENT (OUTPATIENT)
Dept: PHYSICAL THERAPY | Age: 61
End: 2021-11-04
Attending: ORTHOPAEDIC SURGERY
Payer: COMMERCIAL

## 2021-11-09 ENCOUNTER — APPOINTMENT (OUTPATIENT)
Dept: PHYSICAL THERAPY | Age: 61
End: 2021-11-09
Attending: ORTHOPAEDIC SURGERY
Payer: COMMERCIAL

## 2021-11-11 ENCOUNTER — APPOINTMENT (OUTPATIENT)
Dept: PHYSICAL THERAPY | Age: 61
End: 2021-11-11
Attending: ORTHOPAEDIC SURGERY
Payer: COMMERCIAL

## 2021-11-16 ENCOUNTER — OFFICE VISIT (OUTPATIENT)
Dept: PHYSICAL THERAPY | Age: 61
End: 2021-11-16
Attending: ORTHOPAEDIC SURGERY
Payer: COMMERCIAL

## 2021-11-16 PROCEDURE — 97110 THERAPEUTIC EXERCISES: CPT

## 2021-11-16 PROCEDURE — 97530 THERAPEUTIC ACTIVITIES: CPT

## 2021-11-16 NOTE — PROGRESS NOTES
Associated DX:  Primary osteoarthritis of right knee (M17.11)  Primary osteoarthritis of left knee (M17.12)    Insurance (Authorized # of Visits):  Irene Rivera Physician: Dr. Karolina Gill MD visit: none scheduled  Fall Risk: standa x 10 B   Heel raises - single leg x 20 B  Single leg balance (on Airex) R 17, 34 seconds; L 16, 16 seconds TherEx:  LE bike level 8 x 5 minutes  Knee extension in seated with overpressure x 10 B  TKE with black band 5 second hold - eccentric x 20  Leg pres 20 B, sidelying hip abduction Discontinue), sit to stand with band around knees (discontinue) 10/07/21 black band for TKE, increase to 30 reps with hip strengthening 10/12/21 single leg standing 10/14/21 eccentric TKE 10/19/21 side stepping with band, sing

## 2021-11-18 ENCOUNTER — OFFICE VISIT (OUTPATIENT)
Dept: PHYSICAL THERAPY | Age: 61
End: 2021-11-18
Attending: ORTHOPAEDIC SURGERY
Payer: COMMERCIAL

## 2021-11-18 PROCEDURE — 97110 THERAPEUTIC EXERCISES: CPT

## 2021-11-19 NOTE — PROGRESS NOTES
Associated DX:  Primary osteoarthritis of right knee (M17.11)  Primary osteoarthritis of left knee (M17.12)    Insurance (Authorized # of Visits):  [facility] Physician: Dr. Anny Vazquez Next MD visit: none scheduled  Fall Risk: standa in seated with overpressure x 10 B  TKE with black band 5 second hold - eccentric x 20  Leg press (3 blue + 1 grey bands) x 30  Side stepping (blue )  2 x 30 feet   Monster walking 2 x 30feet  Reverse toe tap 6 inch step x 10 B  Forward heel tap 6 inch x 1 hamstrings curls x 20 B  Wall sit 2 x 30 seconds  Forward and lateral taps standing on airex x 10 B     HEP: knee extension in seated with overpressure x 10 (4 times a day); quad set x 20 (2 times a day) (discontnue)  10/05/21 blue band TKE 5 second hold x

## 2021-12-02 ENCOUNTER — OFFICE VISIT (OUTPATIENT)
Dept: PHYSICAL THERAPY | Age: 61
End: 2021-12-02
Attending: ORTHOPAEDIC SURGERY
Payer: COMMERCIAL

## 2021-12-02 PROCEDURE — 97110 THERAPEUTIC EXERCISES: CPT

## 2021-12-02 NOTE — PROGRESS NOTES
DISCHARGE SUMMARY     Associated DX:  Primary osteoarthritis of right knee (M17.11)  Primary osteoarthritis of left knee (M17.12)    Insurance (Authorized # of Visits):  G[facility]mez Ritter Physician: Dr. Debbie Gill MD visit: none [place] hold - eccentric x 20  Leg press (3 blue bands) x 20  Side stepping (green)  2 x 15  Reverse toe tap 4 inch step x 20 B  Running man isometric (yellow ball) 10 second hold x 10 B  Heel raises x 20  Single leg balance R 20 seconds; L 24 seconds TherEx:  LE 11/18/21   Tx 12 Date 12/02/21   Tx13   TherAct:  Review of plan of care, knee anatomy, reassessment of symptoms.    TherEx:  Knee extension in seated with overpressure x 10 B  Knee flexion in chair 2 x 10 B  Lateral heel tap 6 inch step x 10 B  Forward enedelia therapy with a home exercises program.    Charges: 3TE      Total Timed Treatment: 45 min  Total Treatment Time: 45 min    Patient was advised of these findings, precautions, and treatment options and has agreed to actively participate in planning and for

## 2021-12-11 ENCOUNTER — PATIENT MESSAGE (OUTPATIENT)
Dept: FAMILY MEDICINE CLINIC | Facility: CLINIC | Age: 61
End: 2021-12-11

## 2021-12-11 RX ORDER — LISINOPRIL 20 MG/1
20 TABLET ORAL DAILY
Qty: 90 TABLET | Refills: 0 | Status: SHIPPED | OUTPATIENT
Start: 2021-12-11

## 2021-12-11 NOTE — TELEPHONE ENCOUNTER
From: Yesi Brown  To: Tierney Green MD  Sent: 12/11/2021 10:58 AM CST  Subject: Lisinopril Refill    Would you please send in a refill for my Lisinopril to [facility]. Thank you.

## 2021-12-11 NOTE — TELEPHONE ENCOUNTER
Hypertension Medications Protocol Passed 12/11/2021 11:00 AM   Protocol Details  CMP or BMP in past 12 months    Last serum creatinine< 2.0    Appointment in past 6 or next 3 months     Rx sent.

## 2021-12-16 ENCOUNTER — APPOINTMENT (OUTPATIENT)
Dept: PHYSICAL THERAPY | Age: 61
End: 2021-12-16
Attending: ORTHOPAEDIC SURGERY
Payer: COMMERCIAL

## 2021-12-23 ENCOUNTER — HOSPITAL ENCOUNTER (OUTPATIENT)
Dept: GENERAL RADIOLOGY | Age: 61
Discharge: HOME | End: 2021-12-23
Attending: NURSE PRACTITIONER
Payer: COMMERCIAL

## 2021-12-23 ENCOUNTER — TELEPHONE (OUTPATIENT)
Dept: FAMILY MEDICINE CLINIC | Facility: CLINIC | Age: 61
End: 2021-12-23

## 2021-12-23 ENCOUNTER — OFFICE VISIT (OUTPATIENT)
Dept: FAMILY MEDICINE CLINIC | Facility: CLINIC | Age: 61
End: 2021-12-23
Payer: COMMERCIAL

## 2021-12-23 VITALS
OXYGEN SATURATION: 99 % | WEIGHT: 235 LBS | BODY MASS INDEX: 33.64 KG/M2 | HEART RATE: 84 BPM | SYSTOLIC BLOOD PRESSURE: 126 MMHG | RESPIRATION RATE: 18 BRPM | DIASTOLIC BLOOD PRESSURE: 84 MMHG | TEMPERATURE: 98 F | HEIGHT: 70 IN

## 2021-12-23 DIAGNOSIS — S49.91XA INJURY OF RIGHT SHOULDER, INITIAL ENCOUNTER: ICD-10-CM

## 2021-12-23 DIAGNOSIS — S49.91XA INJURY OF RIGHT SHOULDER, INITIAL ENCOUNTER: Primary | ICD-10-CM

## 2021-12-23 PROCEDURE — 73030 X-RAY EXAM OF SHOULDER: CPT | Performed by: NURSE PRACTITIONER

## 2021-12-23 PROCEDURE — 3008F BODY MASS INDEX DOCD: CPT | Performed by: NURSE PRACTITIONER

## 2021-12-23 PROCEDURE — 99213 OFFICE O/P EST LOW 20 MIN: CPT | Performed by: NURSE PRACTITIONER

## 2021-12-23 PROCEDURE — A4565 SLINGS: HCPCS | Performed by: NURSE PRACTITIONER

## 2021-12-23 PROCEDURE — 3074F SYST BP LT 130 MM HG: CPT | Performed by: NURSE PRACTITIONER

## 2021-12-23 PROCEDURE — 3079F DIAST BP 80-89 MM HG: CPT | Performed by: NURSE PRACTITIONER

## 2021-12-23 NOTE — TELEPHONE ENCOUNTER
----- Message from DAVID Pat sent at 12/23/2021 11:09 AM CST -----  Displaced distal clavicle consistent with AC ligament injury. Needs to wear sling and schedule with ortho. If not able to get in next week, please let us know.

## 2021-12-23 NOTE — PATIENT INSTRUCTIONS
Understanding AC Joint Sprain    The AC (acromioclavicular) joint is where the shoulder blade (scapula) meets the collarbone (clavicle). The highest point of the shoulder blade is called the acromion.  Strong tissues called ligaments connect the acromion educational content on 3/1/2020  © 6435-3639 [employer]. All rights reserved. This information is not intended as a substitute for professional medical care. Always follow your healthcare professional's instructions.

## 2021-12-23 NOTE — PROGRESS NOTES
HPI: HPI   Patient is here for right shoulder injury. He as skiing in [place] yesterday. He hit a bump and landed on his right shoulder. He also hit is his slightly but was wearing a helmet. Was painful at time of injury. Now having minimal pain.  Was a fever.   Eyes: Negative for visual disturbance. Respiratory: Negative for cough. Cardiovascular: Negative for chest pain. Musculoskeletal: Negative for joint swelling. Skin: Negative for wound. Neurological: Negative for headaches.        EXAM:

## 2022-01-04 PROBLEM — S43.101A AC SEPARATION, TYPE 3, RIGHT, INITIAL ENCOUNTER: Status: ACTIVE | Noted: 2022-01-04

## 2022-01-17 ENCOUNTER — TELEMEDICINE (OUTPATIENT)
Dept: FAMILY MEDICINE CLINIC | Facility: CLINIC | Age: 62
End: 2022-01-17

## 2022-01-17 DIAGNOSIS — J01.00 ACUTE MAXILLARY SINUSITIS, RECURRENCE NOT SPECIFIED: Primary | ICD-10-CM

## 2022-01-17 PROCEDURE — 99213 OFFICE O/P EST LOW 20 MIN: CPT | Performed by: FAMILY MEDICINE

## 2022-01-17 RX ORDER — AZITHROMYCIN 250 MG/1
TABLET, FILM COATED ORAL
Qty: 6 TABLET | Refills: 0 | Status: SHIPPED | OUTPATIENT
Start: 2022-01-17 | End: 2022-01-22

## 2022-01-17 NOTE — PROGRESS NOTES
No chief complaint on file. Patient is here cough  This visit is conducted using Telemedicine with live, interactive video and audio.     Patient has been referred to the [facility] website at [email or web address] to review the yearly Consent to Treat docume 01/31/2011   • Sleep disturbance, unspecified 01/31/2011      Past Surgical History:   Procedure Laterality Date   • COLONOSCOPY N/A 12/14/2018    Procedure: COLONOSCOPY;  Surgeon: Aicha Fowler DO;  Location: [address] [place] [address]      Family History   Problem R

## 2022-03-14 RX ORDER — LISINOPRIL 20 MG/1
20 TABLET ORAL DAILY
Qty: 90 TABLET | Refills: 0 | Status: SHIPPED | OUTPATIENT
Start: 2022-03-14

## 2022-06-14 ENCOUNTER — PATIENT MESSAGE (OUTPATIENT)
Dept: FAMILY MEDICINE CLINIC | Facility: CLINIC | Age: 62
End: 2022-06-14

## 2022-06-14 RX ORDER — LISINOPRIL 20 MG/1
20 TABLET ORAL DAILY
Qty: 90 TABLET | Refills: 0 | Status: SHIPPED | OUTPATIENT
Start: 2022-06-14

## 2022-06-14 RX ORDER — LISINOPRIL 20 MG/1
20 TABLET ORAL DAILY
Qty: 90 TABLET | Refills: 0 | Status: CANCELLED | OUTPATIENT
Start: 2022-06-14

## 2022-06-14 NOTE — TELEPHONE ENCOUNTER
From: Braden Becerra  To: Oscar Reed MD  Sent: 6/14/2022 7:12 AM CDT  Subject: Lisinopril Refill    Would you please refill my Lisinopril prescription?

## 2022-06-22 ENCOUNTER — TELEMEDICINE (OUTPATIENT)
Dept: FAMILY MEDICINE CLINIC | Facility: CLINIC | Age: 62
End: 2022-06-22

## 2022-06-22 DIAGNOSIS — I10 ESSENTIAL HYPERTENSION, BENIGN: ICD-10-CM

## 2022-06-22 DIAGNOSIS — J01.00 ACUTE MAXILLARY SINUSITIS, RECURRENCE NOT SPECIFIED: Primary | ICD-10-CM

## 2022-06-22 PROCEDURE — 99214 OFFICE O/P EST MOD 30 MIN: CPT | Performed by: FAMILY MEDICINE

## 2022-06-22 RX ORDER — AZITHROMYCIN 250 MG/1
TABLET, FILM COATED ORAL
Qty: 6 TABLET | Refills: 0 | Status: SHIPPED | OUTPATIENT
Start: 2022-06-22 | End: 2022-06-27

## 2022-06-22 RX ORDER — DEXTROMETHORPHAN HYDROBROMIDE AND PROMETHAZINE HYDROCHLORIDE 15; 6.25 MG/5ML; MG/5ML
2.5 SYRUP ORAL 4 TIMES DAILY PRN
Qty: 118 ML | Refills: 0 | Status: SHIPPED | OUTPATIENT
Start: 2022-06-22 | End: 2022-07-02

## 2022-06-29 ENCOUNTER — TELEPHONE (OUTPATIENT)
Dept: FAMILY MEDICINE CLINIC | Facility: CLINIC | Age: 62
End: 2022-06-29

## 2022-06-29 ENCOUNTER — OFFICE VISIT (OUTPATIENT)
Dept: FAMILY MEDICINE CLINIC | Facility: CLINIC | Age: 62
End: 2022-06-29
Payer: COMMERCIAL

## 2022-06-29 VITALS
SYSTOLIC BLOOD PRESSURE: 122 MMHG | DIASTOLIC BLOOD PRESSURE: 80 MMHG | OXYGEN SATURATION: 98 % | HEART RATE: 85 BPM | HEIGHT: 70 IN | RESPIRATION RATE: 18 BRPM | TEMPERATURE: 97 F | BODY MASS INDEX: 33.79 KG/M2 | WEIGHT: 236 LBS

## 2022-06-29 DIAGNOSIS — J01.00 ACUTE MAXILLARY SINUSITIS, RECURRENCE NOT SPECIFIED: ICD-10-CM

## 2022-06-29 DIAGNOSIS — R22.42 MASS OF LEFT THIGH: Primary | ICD-10-CM

## 2022-06-29 DIAGNOSIS — I10 ESSENTIAL HYPERTENSION, BENIGN: ICD-10-CM

## 2022-06-29 PROCEDURE — 3074F SYST BP LT 130 MM HG: CPT | Performed by: FAMILY MEDICINE

## 2022-06-29 PROCEDURE — 3008F BODY MASS INDEX DOCD: CPT | Performed by: FAMILY MEDICINE

## 2022-06-29 PROCEDURE — 99214 OFFICE O/P EST MOD 30 MIN: CPT | Performed by: FAMILY MEDICINE

## 2022-06-29 PROCEDURE — 3079F DIAST BP 80-89 MM HG: CPT | Performed by: FAMILY MEDICINE

## 2022-06-29 RX ORDER — DICLOFENAC SODIUM 75 MG/1
75 TABLET, DELAYED RELEASE ORAL 2 TIMES DAILY
COMMUNITY
Start: 2022-06-05

## 2022-07-07 ENCOUNTER — TELEPHONE (OUTPATIENT)
Dept: FAMILY MEDICINE CLINIC | Facility: CLINIC | Age: 62
End: 2022-07-07

## 2022-07-07 DIAGNOSIS — R22.42 MASS OF LEFT THIGH: Primary | ICD-10-CM

## 2022-07-07 RX ORDER — CLINDAMYCIN HYDROCHLORIDE 300 MG/1
300 CAPSULE ORAL 3 TIMES DAILY
Qty: 21 CAPSULE | Refills: 0 | Status: SHIPPED | OUTPATIENT
Start: 2022-07-07 | End: 2022-07-14

## 2022-07-10 ENCOUNTER — HOSPITAL ENCOUNTER (OUTPATIENT)
Age: 62
Discharge: HOME | End: 2022-07-10
Payer: COMMERCIAL

## 2022-07-10 ENCOUNTER — PATIENT MESSAGE (OUTPATIENT)
Dept: FAMILY MEDICINE CLINIC | Facility: CLINIC | Age: 62
End: 2022-07-10

## 2022-07-10 VITALS
SYSTOLIC BLOOD PRESSURE: 134 MMHG | HEIGHT: 72 IN | WEIGHT: 230 LBS | BODY MASS INDEX: 31.15 KG/M2 | OXYGEN SATURATION: 100 % | TEMPERATURE: 98 F | RESPIRATION RATE: 20 BRPM | HEART RATE: 82 BPM | DIASTOLIC BLOOD PRESSURE: 67 MMHG

## 2022-07-10 DIAGNOSIS — H60.332 ACUTE SWIMMER'S EAR OF LEFT SIDE: Primary | ICD-10-CM

## 2022-07-10 RX ORDER — AMOXICILLIN AND CLAVULANATE POTASSIUM 875; 125 MG/1; MG/1
1 TABLET, FILM COATED ORAL 2 TIMES DAILY
Qty: 20 TABLET | Refills: 0 | Status: SHIPPED | OUTPATIENT
Start: 2022-07-10 | End: 2022-07-20

## 2022-07-10 RX ORDER — NEOMYCIN SULFATE, POLYMYXIN B SULFATE AND HYDROCORTISONE 10; 3.5; 1 MG/ML; MG/ML; [USP'U]/ML
4 SUSPENSION/ DROPS AURICULAR (OTIC) 4 TIMES DAILY
Qty: 10 ML | Refills: 0 | Status: SHIPPED | OUTPATIENT
Start: 2022-07-10

## 2022-07-10 NOTE — ED INITIAL ASSESSMENT (HPI)
Patient has had left ear pain for a few days. His left ear canal appears swollen, unable to tell if wax is in there due to the swelling.  Patient states he has had some drainage from his ear as well

## 2022-07-11 ENCOUNTER — TELEPHONE (OUTPATIENT)
Dept: FAMILY MEDICINE CLINIC | Facility: CLINIC | Age: 62
End: 2022-07-11

## 2022-07-11 NOTE — TELEPHONE ENCOUNTER
----- Message from Cherelle Lowe sent at 7/10/2022 10:59 AM CDT -----  Regarding: Ear Infection  Dr Jackie Domínguez-    I was treated today for acute swimmers ear. I have been having severe pain in my left ear for the past 3 days. It is now to the point that I can barely hear out of it. I was put on Neomycin-Polymyxin-Hydrocortisone drops and the antibiotic Augmentin and told to stop the clindammycin and let you know. Please let me know how you would like me to proceed with the clindamycin. Thank you.     Raghu Thompson

## 2022-07-11 NOTE — TELEPHONE ENCOUNTER
Patient states left ear pain started Thursday.   Went to IC yesterday  Prescribed augmentin and ear drops  States he was also taking clindamycin prescribed by Dr Tayo Bruner but they told him to stop this and start [place] is what he did  Also told to f/u with Dr Tayo Bruner to see which antibiotic they wanted him to take  No fevers or chills-unable to hear out of ear still  Routing to covering provider to advise

## 2022-07-11 NOTE — TELEPHONE ENCOUNTER
From: Aurelio Contreras  To: Jaron Perez MD  Sent: 7/10/2022 10:59 AM CDT  Subject: Ear Infection    Dr Aniyah Stock-    I was treated today for acute swimmers ear. I have been having severe pain in my left ear for the past 3 days. It is now to the point that I can barely hear out of it. I was put on Neomycin-Polymyxin-Hydrocortisone drops and the antibiotic Augmentin and told to stop the clindammycin and let you know. Please let me know how you would like me to proceed with the clindamycin. Thank you.     Kartik Gonzalez

## 2022-07-17 ENCOUNTER — HOSPITAL ENCOUNTER (OUTPATIENT)
Dept: CT IMAGING | Age: 62
End: 2022-07-17
Attending: FAMILY MEDICINE
Payer: COMMERCIAL

## 2022-07-17 ENCOUNTER — HOSPITAL ENCOUNTER (OUTPATIENT)
Dept: CT IMAGING | Age: 62
Discharge: HOME | End: 2022-07-17
Attending: FAMILY MEDICINE
Payer: COMMERCIAL

## 2022-07-17 DIAGNOSIS — R22.42 MASS OF LEFT THIGH: ICD-10-CM

## 2022-07-17 LAB — CREAT BLD-MCNC: 1.2 MG/DL

## 2022-07-17 PROCEDURE — 82565 ASSAY OF CREATININE: CPT

## 2022-07-17 PROCEDURE — 72193 CT PELVIS W/DYE: CPT | Performed by: FAMILY MEDICINE

## 2022-07-18 ENCOUNTER — PATIENT MESSAGE (OUTPATIENT)
Dept: FAMILY MEDICINE CLINIC | Facility: CLINIC | Age: 62
End: 2022-07-18

## 2022-07-18 DIAGNOSIS — L02.31 ABSCESS, GLUTEAL, LEFT: Primary | ICD-10-CM

## 2022-07-18 NOTE — TELEPHONE ENCOUNTER
From: Melia Murguia  To: Jeanette Thompson MD  Sent: 7/18/2022 1:09 PM CDT  Subject: Pelvic CT    I saw the results yesterday in [address] Just wondering the next steps are. Thank you.     Filomena Leram

## 2022-07-21 ENCOUNTER — OFFICE VISIT (OUTPATIENT)
Facility: LOCATION | Age: 62
End: 2022-07-21
Payer: COMMERCIAL

## 2022-07-21 VITALS
DIASTOLIC BLOOD PRESSURE: 79 MMHG | SYSTOLIC BLOOD PRESSURE: 138 MMHG | WEIGHT: 230 LBS | HEIGHT: 72 IN | BODY MASS INDEX: 31.15 KG/M2 | TEMPERATURE: 98 F | HEART RATE: 86 BPM

## 2022-07-21 DIAGNOSIS — K61.0 ANAL ABSCESS: Primary | ICD-10-CM

## 2022-07-21 PROCEDURE — 3078F DIAST BP <80 MM HG: CPT | Performed by: COLON & RECTAL SURGERY

## 2022-07-21 PROCEDURE — 99245 OFF/OP CONSLTJ NEW/EST HI 55: CPT | Performed by: COLON & RECTAL SURGERY

## 2022-07-21 PROCEDURE — 46600 DIAGNOSTIC ANOSCOPY SPX: CPT | Performed by: COLON & RECTAL SURGERY

## 2022-07-21 PROCEDURE — 3075F SYST BP GE 130 - 139MM HG: CPT | Performed by: COLON & RECTAL SURGERY

## 2022-07-21 PROCEDURE — 3008F BODY MASS INDEX DOCD: CPT | Performed by: COLON & RECTAL SURGERY

## 2022-07-21 NOTE — PATIENT INSTRUCTIONS
This patient has had 8 months of symptoms. He states that he had pain on the left side of the rectum and pelvis. It was \"deep down\". It was worse when he was driving. 1 month ago, he felt that the area was getting swollen. He states that he was showing a bump on his butt. He still felt it was deep in the tissues. He underwent ultrasound examination elsewhere, ultimately CT scan at our institution. Ultrasound elsewhere revealed an abscess that he states was 3 cm. I have personally reviewed the CT scan performed at [facility] performed on July 17, 2022. The CT shows that there is abscess pocket has grown to 11.9 cm. It remains within the subcutaneous tissues of the left lateral buttock extending superiorly into the perirectal space, below the levator ani musculature, but definitely following an extra sphincteric pattern. There is no surrounding inflammation of the tissue. There is some slight thickening of the rectum at that side of the buttock. There are no air bubbles within this fluid collection. Please see the radiologist findings for any other incidentals of the CT scan of the pelvis. The patient has no fever or chills. He has had symptoms for a full 8 months. He is having 6/10 pain at the site. There is pain that is 24 hours a day, and not related to defecation. He feels no obstruction to defecation. He neither has diarrhea nor constipation. He has no bright red blood per rectum. He has no melena. He has no mucus in the stool or narrowing of the stool. He has no associated abdominal pain or distention. He has not suffered weight loss as a symptom. He has no family history of colon or rectal cancer. He has never had a prior anal rectal abscess, or anal fistula. He has not had any surgery on the anus or anal canal.  He has never had prior symptoms or surgery for hemorrhoids.     He has never been diagnosed with Crohn disease, ulcerative colitis, or irritable bowel syndrome. He is not taking any blood thinners. He has never had heart attack, stroke, heart murmur, heart valve replacement, or cardiac arrhythmia. Clinical exam including anoscopy reveals him to have a very large palpable fluid collection deep in the tissues of the left ischiorectal fossa that can extend above my fingertip on digital rectal exam in the left side of the anal canal.  It definitely gets above the sphincter complex. There is no sign of proctitis or Crohn's disease. There is no evidence of internal fistula. There is no blood or pus on the examining finger. I can compress the fluid collection without exuding any purulent material in the anus. There are no significant external or internal hemorrhoids present. There is definite stricture of the anal canal based on this large compressive fluid collection in the left lateral aspect of the buttock. Prostate is normal.    This patient will require drainage of this apparent abscess that has been present most likely to some degree for the last 8 months. We will be taking him to the operating room on an urgent basis tomorrow. Most likely we will simply drain this very large fluid collection. We may have to put in some type of a drain or Malecot catheter. Most likely this will be an outpatient procedure done under general anesthesia. I talked to the patient's son who is an emergency medicine resident in [place]. I talked fully with the patient himself on FaceTime with the son. I described the procedures necessary to both of them. I told them that if we find a fistula, we most likely will place a seton suture to provide drainage until the abscess pocket closes. If we do not find a fistula, we will simply evacuate this large fluid collection and allow this site to hopefully close on its own. I definitely described that there will be a large cavity palpable in the left lateral buttock.   This will take at least 8 to 10 weeks to heal.    If there is a seton suture placed through a fistula tract, it may require another major procedure to close the fistula tract, including secondary fistulotomy, or mucosal advancement flap. If no fistula is identified at the first operation, sometimes this will heal down to a fine point, and a fistula will become obvious. This then would also require 1 or 2 operations to fix. Etiology of this fluid collection remains unclear, but the most likely source is a standard crypto glandular abscess that started with the fistula. The fistula may have long since healed since he has had symptoms for 8 months. All risks, benefits, complications and alternatives to the proposed procedure(s) were fully discussed with the patient. All questions from the patient were answered in detail. A description of the procedure(s) and possible outcomes was fully discussed. The patient seemed to understand the conversation and its details. Consent for the procedure(s) was confirmed with the patient.

## 2022-07-22 ENCOUNTER — ANESTHESIA (OUTPATIENT)
Dept: SURGERY | Facility: HOSPITAL | Age: 62
End: 2022-07-22
Payer: COMMERCIAL

## 2022-07-22 ENCOUNTER — HOSPITAL ENCOUNTER (OUTPATIENT)
Facility: HOSPITAL | Age: 62
Setting detail: HOSPITAL OUTPATIENT SURGERY
Discharge: HOME | End: 2022-07-22
Attending: COLON & RECTAL SURGERY | Admitting: COLON & RECTAL SURGERY
Payer: COMMERCIAL

## 2022-07-22 ENCOUNTER — ANESTHESIA EVENT (OUTPATIENT)
Dept: SURGERY | Facility: HOSPITAL | Age: 62
End: 2022-07-22
Payer: COMMERCIAL

## 2022-07-22 ENCOUNTER — TELEPHONE (OUTPATIENT)
Facility: LOCATION | Age: 62
End: 2022-07-22

## 2022-07-22 VITALS
RESPIRATION RATE: 16 BRPM | BODY MASS INDEX: 31.97 KG/M2 | OXYGEN SATURATION: 99 % | WEIGHT: 236 LBS | TEMPERATURE: 98 F | SYSTOLIC BLOOD PRESSURE: 133 MMHG | HEART RATE: 85 BPM | DIASTOLIC BLOOD PRESSURE: 83 MMHG | HEIGHT: 72 IN

## 2022-07-22 DIAGNOSIS — K61.1 PERIRECTAL ABSCESS: ICD-10-CM

## 2022-07-22 LAB
ANION GAP SERPL CALC-SCNC: 6 MMOL/L (ref 0–18)
BUN BLD-MCNC: 20 MG/DL (ref 7–18)
CALCIUM BLD-MCNC: 9.4 MG/DL (ref 8.5–10.1)
CHLORIDE SERPL-SCNC: 109 MMOL/L (ref 98–112)
CO2 SERPL-SCNC: 24 MMOL/L (ref 21–32)
CREAT BLD-MCNC: 1.31 MG/DL
GLUCOSE BLD-MCNC: 87 MG/DL (ref 70–99)
OSMOLALITY SERPL CALC.SUM OF ELEC: 290 MOSM/KG (ref 275–295)
POTASSIUM SERPL-SCNC: 4.3 MMOL/L (ref 3.5–5.1)
SARS-COV-2 RNA RESP QL NAA+PROBE: NOT DETECTED
SODIUM SERPL-SCNC: 139 MMOL/L (ref 136–145)

## 2022-07-22 PROCEDURE — 93005 ELECTROCARDIOGRAM TRACING: CPT

## 2022-07-22 PROCEDURE — 80048 BASIC METABOLIC PNL TOTAL CA: CPT

## 2022-07-22 PROCEDURE — 87075 CULTR BACTERIA EXCEPT BLOOD: CPT | Performed by: COLON & RECTAL SURGERY

## 2022-07-22 PROCEDURE — 87206 SMEAR FLUORESCENT/ACID STAI: CPT | Performed by: COLON & RECTAL SURGERY

## 2022-07-22 PROCEDURE — 87077 CULTURE AEROBIC IDENTIFY: CPT | Performed by: COLON & RECTAL SURGERY

## 2022-07-22 PROCEDURE — 87205 SMEAR GRAM STAIN: CPT | Performed by: COLON & RECTAL SURGERY

## 2022-07-22 PROCEDURE — 87070 CULTURE OTHR SPECIMN AEROBIC: CPT | Performed by: COLON & RECTAL SURGERY

## 2022-07-22 PROCEDURE — 87102 FUNGUS ISOLATION CULTURE: CPT | Performed by: COLON & RECTAL SURGERY

## 2022-07-22 PROCEDURE — 87186 SC STD MICRODIL/AGAR DIL: CPT | Performed by: COLON & RECTAL SURGERY

## 2022-07-22 PROCEDURE — 93010 ELECTROCARDIOGRAM REPORT: CPT | Performed by: INTERNAL MEDICINE

## 2022-07-22 PROCEDURE — 0D9Q7ZX DRAINAGE OF ANUS, VIA NATURAL OR ARTIFICIAL OPENING, DIAGNOSTIC: ICD-10-PCS | Performed by: COLON & RECTAL SURGERY

## 2022-07-22 RX ORDER — LIDOCAINE HYDROCHLORIDE 10 MG/ML
INJECTION, SOLUTION EPIDURAL; INFILTRATION; INTRACAUDAL; PERINEURAL AS NEEDED
Status: DISCONTINUED | OUTPATIENT
Start: 2022-07-22 | End: 2022-07-22 | Stop reason: SURG

## 2022-07-22 RX ORDER — ROCURONIUM BROMIDE 10 MG/ML
INJECTION, SOLUTION INTRAVENOUS AS NEEDED
Status: DISCONTINUED | OUTPATIENT
Start: 2022-07-22 | End: 2022-07-22 | Stop reason: SURG

## 2022-07-22 RX ORDER — HYDROCODONE BITARTRATE AND ACETAMINOPHEN 5; 325 MG/1; MG/1
1 TABLET ORAL EVERY 6 HOURS PRN
Qty: 10 TABLET | Refills: 0 | Status: SHIPPED | OUTPATIENT
Start: 2022-07-22

## 2022-07-22 RX ORDER — MIDAZOLAM HYDROCHLORIDE 1 MG/ML
1 INJECTION INTRAMUSCULAR; INTRAVENOUS EVERY 5 MIN PRN
Status: DISCONTINUED | OUTPATIENT
Start: 2022-07-22 | End: 2022-07-22

## 2022-07-22 RX ORDER — BUPIVACAINE HYDROCHLORIDE AND EPINEPHRINE 5; 5 MG/ML; UG/ML
INJECTION, SOLUTION EPIDURAL; INTRACAUDAL; PERINEURAL AS NEEDED
Status: DISCONTINUED | OUTPATIENT
Start: 2022-07-22 | End: 2022-07-22

## 2022-07-22 RX ORDER — ONDANSETRON 2 MG/ML
INJECTION INTRAMUSCULAR; INTRAVENOUS AS NEEDED
Status: DISCONTINUED | OUTPATIENT
Start: 2022-07-22 | End: 2022-07-22 | Stop reason: SURG

## 2022-07-22 RX ORDER — DIPHENHYDRAMINE HYDROCHLORIDE 50 MG/ML
INJECTION INTRAMUSCULAR; INTRAVENOUS AS NEEDED
Status: DISCONTINUED | OUTPATIENT
Start: 2022-07-22 | End: 2022-07-22 | Stop reason: SURG

## 2022-07-22 RX ORDER — DEXAMETHASONE SODIUM PHOSPHATE 4 MG/ML
VIAL (ML) INJECTION AS NEEDED
Status: DISCONTINUED | OUTPATIENT
Start: 2022-07-22 | End: 2022-07-22

## 2022-07-22 RX ORDER — NICOTINE POLACRILEX 4 MG
15 LOZENGE BUCCAL
Status: DISCONTINUED | OUTPATIENT
Start: 2022-07-22 | End: 2022-07-22

## 2022-07-22 RX ORDER — HYDROMORPHONE HYDROCHLORIDE 1 MG/ML
0.2 INJECTION, SOLUTION INTRAMUSCULAR; INTRAVENOUS; SUBCUTANEOUS EVERY 5 MIN PRN
Status: DISCONTINUED | OUTPATIENT
Start: 2022-07-22 | End: 2022-07-22

## 2022-07-22 RX ORDER — SCOLOPAMINE TRANSDERMAL SYSTEM 1 MG/1
1 PATCH, EXTENDED RELEASE TRANSDERMAL ONCE
Status: DISCONTINUED | OUTPATIENT
Start: 2022-07-22 | End: 2022-07-22

## 2022-07-22 RX ORDER — SODIUM CHLORIDE, SODIUM LACTATE, POTASSIUM CHLORIDE, CALCIUM CHLORIDE 600; 310; 30; 20 MG/100ML; MG/100ML; MG/100ML; MG/100ML
INJECTION, SOLUTION INTRAVENOUS CONTINUOUS
Status: DISCONTINUED | OUTPATIENT
Start: 2022-07-22 | End: 2022-07-22

## 2022-07-22 RX ORDER — DIPHENHYDRAMINE HYDROCHLORIDE 50 MG/ML
12.5 INJECTION INTRAMUSCULAR; INTRAVENOUS AS NEEDED
Status: DISCONTINUED | OUTPATIENT
Start: 2022-07-22 | End: 2022-07-22

## 2022-07-22 RX ORDER — HYDROMORPHONE HYDROCHLORIDE 1 MG/ML
0.6 INJECTION, SOLUTION INTRAMUSCULAR; INTRAVENOUS; SUBCUTANEOUS EVERY 5 MIN PRN
Status: DISCONTINUED | OUTPATIENT
Start: 2022-07-22 | End: 2022-07-22

## 2022-07-22 RX ORDER — HYDROCODONE BITARTRATE AND ACETAMINOPHEN 5; 325 MG/1; MG/1
2 TABLET ORAL ONCE AS NEEDED
Status: COMPLETED | OUTPATIENT
Start: 2022-07-22 | End: 2022-07-22

## 2022-07-22 RX ORDER — ONDANSETRON 2 MG/ML
4 INJECTION INTRAMUSCULAR; INTRAVENOUS EVERY 6 HOURS PRN
Status: DISCONTINUED | OUTPATIENT
Start: 2022-07-22 | End: 2022-07-22

## 2022-07-22 RX ORDER — NICOTINE POLACRILEX 4 MG
30 LOZENGE BUCCAL
Status: DISCONTINUED | OUTPATIENT
Start: 2022-07-22 | End: 2022-07-22

## 2022-07-22 RX ORDER — NALOXONE HYDROCHLORIDE 0.4 MG/ML
80 INJECTION, SOLUTION INTRAMUSCULAR; INTRAVENOUS; SUBCUTANEOUS AS NEEDED
Status: DISCONTINUED | OUTPATIENT
Start: 2022-07-22 | End: 2022-07-22

## 2022-07-22 RX ORDER — HYDROCODONE BITARTRATE AND ACETAMINOPHEN 5; 325 MG/1; MG/1
1 TABLET ORAL ONCE AS NEEDED
Status: COMPLETED | OUTPATIENT
Start: 2022-07-22 | End: 2022-07-22

## 2022-07-22 RX ORDER — 0.9 % SODIUM CHLORIDE 0.9 %
INTRAVENOUS SOLUTION INTRAVENOUS
Status: DISCONTINUED
Start: 2022-07-22 | End: 2022-07-22

## 2022-07-22 RX ORDER — DEXAMETHASONE SODIUM PHOSPHATE 4 MG/ML
VIAL (ML) INJECTION AS NEEDED
Status: DISCONTINUED | OUTPATIENT
Start: 2022-07-22 | End: 2022-07-22 | Stop reason: SURG

## 2022-07-22 RX ORDER — ACETAMINOPHEN 500 MG
1000 TABLET ORAL ONCE
Status: DISCONTINUED | OUTPATIENT
Start: 2022-07-22 | End: 2022-07-22

## 2022-07-22 RX ORDER — ACETAMINOPHEN 500 MG
1000 TABLET ORAL ONCE AS NEEDED
Status: COMPLETED | OUTPATIENT
Start: 2022-07-22 | End: 2022-07-22

## 2022-07-22 RX ORDER — HEPARIN SODIUM 5000 [USP'U]/ML
5000 INJECTION, SOLUTION INTRAVENOUS; SUBCUTANEOUS ONCE
Status: COMPLETED | OUTPATIENT
Start: 2022-07-22 | End: 2022-07-22

## 2022-07-22 RX ORDER — DEXTROSE MONOHYDRATE 25 G/50ML
50 INJECTION, SOLUTION INTRAVENOUS
Status: DISCONTINUED | OUTPATIENT
Start: 2022-07-22 | End: 2022-07-22

## 2022-07-22 RX ORDER — HYDROMORPHONE HYDROCHLORIDE 1 MG/ML
0.4 INJECTION, SOLUTION INTRAMUSCULAR; INTRAVENOUS; SUBCUTANEOUS EVERY 5 MIN PRN
Status: DISCONTINUED | OUTPATIENT
Start: 2022-07-22 | End: 2022-07-22

## 2022-07-22 RX ORDER — PROCHLORPERAZINE EDISYLATE 5 MG/ML
5 INJECTION INTRAMUSCULAR; INTRAVENOUS EVERY 8 HOURS PRN
Status: DISCONTINUED | OUTPATIENT
Start: 2022-07-22 | End: 2022-07-22

## 2022-07-22 RX ORDER — HEPARIN SODIUM 5000 [USP'U]/ML
INJECTION, SOLUTION INTRAVENOUS; SUBCUTANEOUS
Status: DISCONTINUED
Start: 2022-07-22 | End: 2022-07-22

## 2022-07-22 RX ORDER — CEFOXITIN 2 G/1
INJECTION, POWDER, FOR SOLUTION INTRAVENOUS
Status: DISCONTINUED
Start: 2022-07-22 | End: 2022-07-22

## 2022-07-22 RX ORDER — MEPERIDINE HYDROCHLORIDE 25 MG/ML
12.5 INJECTION INTRAMUSCULAR; INTRAVENOUS; SUBCUTANEOUS AS NEEDED
Status: DISCONTINUED | OUTPATIENT
Start: 2022-07-22 | End: 2022-07-22

## 2022-07-22 RX ADMIN — ONDANSETRON 4 MG: 2 INJECTION INTRAMUSCULAR; INTRAVENOUS at 17:45:00

## 2022-07-22 RX ADMIN — LIDOCAINE HYDROCHLORIDE 50 MG: 10 INJECTION, SOLUTION EPIDURAL; INFILTRATION; INTRACAUDAL; PERINEURAL at 17:15:00

## 2022-07-22 RX ADMIN — DIPHENHYDRAMINE HYDROCHLORIDE 12.5 MG: 50 INJECTION INTRAMUSCULAR; INTRAVENOUS at 17:15:00

## 2022-07-22 RX ADMIN — DEXAMETHASONE SODIUM PHOSPHATE 8 MG: 4 MG/ML VIAL (ML) INJECTION at 17:15:00

## 2022-07-22 RX ADMIN — ROCURONIUM BROMIDE 20 MG: 10 INJECTION, SOLUTION INTRAVENOUS at 17:18:00

## 2022-07-22 NOTE — ANESTHESIA PROCEDURE NOTES
Airway  Date/Time: 7/22/2022 5:17 PM  Urgency: elective      General Information and Staff    Patient location during procedure: OR  Anesthesiologist: Ryan Segundo MD  Performed: anesthesiologist     Indications and Patient Condition  Indications for airway management: anesthesia  Sedation level: deep  Preoxygenated: yes  Patient position: sniffing  Mask difficulty assessment: 1 - vent by mask    Final Airway Details  Final airway type: endotracheal airway      Successful airway: ETT  Cuffed: yes   Successful intubation technique: direct laryngoscopy  Endotracheal tube insertion site: oral  Blade: Viral  Blade size: #3  ETT size (mm): 7.5    Cormack-Lehane Classification: grade IIA - partial view of glottis  Placement verified by: chest auscultation and capnometry   Measured from: lips  ETT to lips (cm): 24  Number of attempts at approach: 1

## 2022-07-22 NOTE — ANESTHESIA POSTPROCEDURE EVALUATION
[facility] [place] Patient Status:  Hospital Outpatient Surgery   Age/Gender 63year old male MRN [id number]   [facility] SURGERY Attending Michelle Roca MD   [facility] Day # 0 PCP Dayton Ashraf MD       Anesthesia Post-op Note    PERIRECTAL  ABSCESS IRRIGATION AND  DEBRIDEMENT    Procedure Summary     Date: 07/22/22 Room / Location: [facility] MAIN OR 08 / [facility] OR    Anesthesia Start: 0084 Anesthesia Stop:     Procedure: [address] AND  DEBRIDEMENT (N/A Anus) Diagnosis:       Perirectal abscess      (Perirectal abscess [K61.1])    Surgeons: Micehlle Roca MD Anesthesiologist: Lisbeth Torres MD    Anesthesia Type: general ASA Status: 2          Anesthesia Type: general    Vitals Value Taken Time   /80 07/22/22 1800   Temp 99.6 07/22/22 1800   Pulse 126 07/22/22 1800   Resp 16 07/22/22 1800   SpO2 96 07/22/22 1800       Patient Location: PACU    Anesthesia Type: general    Airway Patency: extubated    Postop Pain Control: adequate    Mental Status: mildly sedated but able to meaningfully participate in the post-anesthesia evaluation    Nausea/Vomiting: none    Cardiopulmonary/Hydration status: stable euvolemic    Complications: no apparent anesthesia related complications    Postop vital signs: stable    Dental Exam: Unchanged from Preop    Patient to be discharged from PACU when criteria met.

## 2022-07-22 NOTE — OPERATIVE REPORT
[facility]  Operative Note    Andres Board Location: OR   CSN [id number] MRN [id number]   Admission Date 7/22/2022 Operation Date 7/22/2022   Attending Physician Violet Velasco MD Operating Physician Rahat Salomon MD     Pre-Operative Diagnosis: Perirectal abscess [K61.1]    Post-Operative Diagnosis: Same as above    Procedure Performed: Drainage of left sided perianal and perirectal abscess    Surgeon(s) and Role:     Sydney Webb MD - Primary    Anesthesia: General    History of Present Illness: This patient has had symptoms for 8 months. They have acutely gotten worse. The patient went from a 3 cm fluid collection to an 11.5 cm fluid collection along the left side of the rectum starting to intrude on the left ischiorectal fossa. The patient has never had any anal or rectal abscess in the past.  Etiology of this is unclear, but does appear to potentially be from a longstanding perirectal abscess. Operative findings: The effluent out of the fluid cavity was a slightly murky serous fluid. There was no karel pus. It had no odor. There was no necrotic tissue. It began in the left ischiorectal fossa and tracks cephalad along the side of the rectum and an extra sphincteric pattern ending at the levator ani musculature. There was no penetration through the levator ani musculature. I removed a large roof over the fluid cavity, rinsed out the cavity, put in a temporary wet-to-dry dressing. I interrogated the anus and the wound itself and did not demonstrate any fistula tract prior to finishing the case. There is no evidence of Crohn's disease or proctitis. There are no fissures present. There are some minor skin tags around the anus. Operative Summary:    Following adequate general anesthesia the patient was placed in the prone position on the operating room table. The hips were flexed in the jackknife position. The buttocks were taped apart.   The perineum was painted with Betadine paint. Sterile drapes were placed with wide exposure of the anal rectal region. Timeout was performed confirming the patient's identifying data, the procedure to be performed, and the administration of antibiotics. The fluid collection was palpable in the left ischiorectal fossa. That was still at a good 2.5 cm below surface. I cut out a circular portion of skin directly over the abscess. The lips was 4 cm in greatest dimension. I cut down to the fluid collection. I collected a significant amount of fluid and sent this off in a sterile container to microbiology for cultures and sensitivities including aerobic, anaerobic, and fungal collections. There was no foul odor, no karel pus, no necrotic tissue. I achieved hemostasis around the rim of the unroofing. I did pack a few wet-to-dry dressings with the tips hanging out of the wound so they would not be lost in the deep portion of the cavity. A dry dressing was placed on top. The patient was delivered to recovery room in stable postoperative condition.             EBL: 10 cc    Pathologic Specimen: Aerobic, anaerobic, and fungal cultures    Drains: None    Ivett Chairez MD  7/22/2022  6:07 PM

## 2022-07-22 NOTE — INTERVAL H&P NOTE
Pre-op Diagnosis: Perirectal abscess [K61.1]    The above referenced H&P was reviewed by Santi Albright MD on 7/22/2022, the patient was examined and no significant changes have occurred in the patient's condition since the H&P was performed. I discussed with the patient and/or legal representative the potential benefits, risks and side effects of this procedure; the likelihood of the patient achieving goals; and potential problems that might occur during recuperation. I discussed reasonable alternatives to the procedure, including risks, benefits and side effects related to the alternatives and risks related to not receiving this procedure. We will proceed with procedure as planned.

## 2022-07-23 ENCOUNTER — TELEPHONE (OUTPATIENT)
Dept: FAMILY MEDICINE CLINIC | Facility: CLINIC | Age: 62
End: 2022-07-23

## 2022-07-23 LAB
ATRIAL RATE: 86 BPM
P AXIS: 49 DEGREES
P-R INTERVAL: 132 MS
Q-T INTERVAL: 338 MS
QRS DURATION: 80 MS
QTC CALCULATION (BEZET): 404 MS
R AXIS: 48 DEGREES
T AXIS: 48 DEGREES
VENTRICULAR RATE: 86 BPM

## 2022-07-23 NOTE — TELEPHONE ENCOUNTER
Called pt. He states he is doing well. He does not have any questions/concerns and is following all of the discharge instructions provided to him.

## 2022-08-08 ENCOUNTER — OFFICE VISIT (OUTPATIENT)
Facility: LOCATION | Age: 62
End: 2022-08-08

## 2022-08-08 VITALS — TEMPERATURE: 98 F | HEART RATE: 83 BPM

## 2022-08-08 DIAGNOSIS — K61.0 ANAL ABSCESS: Primary | ICD-10-CM

## 2022-08-08 NOTE — PATIENT INSTRUCTIONS
The patient presents for continued care and evaluation following a drainage of left sided perianal and perirectal abscess on 7/22/2022. Overall, the patient states he has been doing well postoperatively. He states he continues to have some discomfort at the operation site, but this is tolerable. He is placing a dry gauze over the wound site to help with any drainage. He describes the drainage from the wound is serous and occasionally brown. He denies any active bleeding from the wound site. He denies fevers, chills, nausea or vomiting. Clinical exam of the patient's wound reveals an approximately 6 cm x 6 cm wound bed with healthy pink granulation tissue throughout the wound. There is no surrounding erythema or cellulitis. There is no active drainage or bleeding at the time of today's exam.    The patient is doing well status post drainage of a left sided perianal and perirectal abscess on 7/22/2022. I took the opportunity at this appointment to discuss the pathology with the patient which revealed the abscess to be positive for E. coli. The patient should continue sitz bath's 2-3 times daily. He may place a dry gauze over the incision site for any drainage. The patient may take ibuprofen and Tylenol as needed for pain management. The patient may resume all regular activity. He should refrain from riding a bike until the wound is fully healed. The patient should follow-up for me in approximately 5 weeks for wound recheck. All of the patient's questions were answered. The patient verbalized understanding and agreement with the plan of care.

## 2022-09-12 ENCOUNTER — OFFICE VISIT (OUTPATIENT)
Facility: LOCATION | Age: 62
End: 2022-09-12

## 2022-09-12 VITALS — TEMPERATURE: 98 F | HEART RATE: 83 BPM

## 2022-09-12 DIAGNOSIS — K61.0 ANAL ABSCESS: Primary | ICD-10-CM

## 2022-09-12 NOTE — PATIENT INSTRUCTIONS
The patient presents for continued care and evaluation following a drainage of left sided perianal and perirectal abscess on 7/22/2022. Overall, the patient states he has been doing well postoperatively. He states approximately 3 weeks ago, he began to have right buttock pain when sitting in certain positions. He states this pain is similar to the pain he had prior to the abscess formation and he is concerned of recurrence. He states his wound continues to be healing well. He has some serous drainage, but denies any pustular drainage. He denies any bleeding from the wound site. He is tolerating a general diet. He denies nausea or vomiting. He is having regular bowel movements. Clinical exam was limited to an external exam only. The patient has no external hemorrhoids, fissures, fistula or abscesses. He has an approximately 0.5 cm x 0.5 cm wound bed in the left anterior lateral location. There is healthy pink granulation tissue throughout the wound bed. There are no areas of fluctuance or palpable masses. There is no surrounding erythema or cellulitis. There is no active drainage at the time of today's exam.  At the patient point to where he is having the left buttock pain, and it is approximately 8 cm lateral to his wound site. I explained to the patient that I do not see any signs of recurrence of a perianal abscess. He should call our office with any new or worsening symptoms or signs of infection. I explained to the patient that his pain and the abscess formation may not be entirely related. I would like him to continue to monitor his pain symptoms and follow-up with Dr. Stef Ramirez in approximately 3 weeks for a wound recheck and to discuss his symptoms. The patient expressed understanding with the above plan. All questions and concerns were addressed.

## 2022-10-03 ENCOUNTER — TELEPHONE (OUTPATIENT)
Facility: LOCATION | Age: 62
End: 2022-10-03

## 2022-10-03 ENCOUNTER — OFFICE VISIT (OUTPATIENT)
Facility: LOCATION | Age: 62
End: 2022-10-03

## 2022-10-03 VITALS — TEMPERATURE: 98 F | HEART RATE: 78 BPM

## 2022-10-03 DIAGNOSIS — K61.0 ANAL ABSCESS: Primary | ICD-10-CM

## 2022-10-03 NOTE — PATIENT INSTRUCTIONS
This patient had an anal abscess drained on July 22, 2022. We did a thorough search for fistula at the time of operation. The abscess was in the left posterolateral aspect of the anal margin. The probe did pass all the way through the ischiorectal fossa toward the sphincter process, but did not penetrate through the sphincter. Patient presents at this time for follow-up care. He does see some green/brown drainage from the wound. He has no fever or chills. He has no pain. He does have intermittent drainage on a pad. He has good control of gas and stool. Clinical exam including digital rectal exam, excluding anoscopy, reveals him to have a pinpoint hole at the end of a very well-healed tract that goes from 5 cm away from the anal verge toward the posterior midline. Using an S probe, there is a well-formed fistula tract that goes virtually in line with the healing scar. I did not push the probe all the way into the anal canal, but it appears that there is now an obvious fistula. There are no external hemorrhoids. There are no fissures present. There is no abscess. There are no secondary tracts. I had a long conversation with the patient and his son. The son is an anesthesiologist at [address] [place] [address]. He will require a return to the operating room. He will undergo complex anal fistulotomy. He may require a two-stage procedure. If the fistula goes deep into the muscle, I cannot cut all muscle in a single operation. We would then place a seton suture, and plan a second operation. Many times in men, the second operation is a simple completion fistulotomy. Rarely, this will take a mucosal advancement flap secondary to deep involvement of deeper sphincter muscles. We will know this at the first operation. We are currently scheduling him for October 10, 2022, at [facility] surgical center.     All risks, benefits, complications and alternatives to the proposed procedure(s) were fully discussed with the patient. All questions from the patient were answered in detail. A description of the procedure(s) and possible outcomes was fully discussed. The patient seemed to understand the conversation and its details. Consent for the procedure(s) was confirmed with the patient.

## 2022-10-07 ENCOUNTER — LAB REQUISITION (OUTPATIENT)
Dept: SURGERY | Age: 62
End: 2022-10-07
Payer: COMMERCIAL

## 2022-10-07 DIAGNOSIS — Z01.818 PREOPERATIVE EXAMINATION, UNSPECIFIED: ICD-10-CM

## 2022-10-08 LAB — SARS-COV-2 RNA RESP QL NAA+PROBE: NOT DETECTED

## 2022-10-10 ENCOUNTER — SURGERY CENTER DOCUMENTATION (OUTPATIENT)
Dept: SURGERY | Age: 62
End: 2022-10-10

## 2022-10-31 ENCOUNTER — OFFICE VISIT (OUTPATIENT)
Facility: LOCATION | Age: 62
End: 2022-10-31

## 2022-10-31 VITALS — HEART RATE: 80 BPM | TEMPERATURE: 98 F

## 2022-10-31 DIAGNOSIS — K60.3 ANAL FISTULA: Primary | ICD-10-CM

## 2022-10-31 PROCEDURE — 99024 POSTOP FOLLOW-UP VISIT: CPT

## 2022-10-31 NOTE — PATIENT INSTRUCTIONS
The patient presents for continued care and evaluation following a complex fistulotomy with seton placement on October 10, 2022. The patient states that he is overall doing well postoperatively. He denies significant pain in the area, but continues to have some discomfort. He states he is having daily drainage from the area. He is placing a dry gauze pad over the wound site. He denies fevers or chills. The patient states he is tolerating a general diet. He denies nausea or vomiting. She denies diarrhea or constipation. Clinical exam was limited to external exam only. He has a fistulotomy site in the left anterior lateral location. There is a 5 cm wide by 6 cm long by 1 cm deep wound bed. There is healthy pink granulation tissue throughout the wound bed. There is no surrounding cellulitis or erythema. There is a seton in place. There is no active drainage or bleeding at the time of today's exam.    The patient is doing well status post complex anal fistulotomy with seton placement on October 10, 2022. The patient will ultimately require a completion fistulotomy. He will schedule this operation at the time of today's visit. Additionally, this patient will schedule a follow-up visit with me in 3 to 4 weeks, for wound recheck. All of the patient's questions were answered. The patient verbalized understanding and agreement with the plan of care.

## 2022-11-28 ENCOUNTER — OFFICE VISIT (OUTPATIENT)
Facility: LOCATION | Age: 62
End: 2022-11-28

## 2022-11-28 VITALS — HEART RATE: 83 BPM | TEMPERATURE: 97 F

## 2022-11-28 DIAGNOSIS — K60.3 ANAL FISTULA: Primary | ICD-10-CM

## 2022-11-28 PROBLEM — K60.30 ANAL FISTULA: Status: ACTIVE | Noted: 2022-11-28

## 2022-11-28 PROCEDURE — 99024 POSTOP FOLLOW-UP VISIT: CPT

## 2022-11-28 NOTE — PATIENT INSTRUCTIONS
The patient presents for continued care and evaluation following a complex fistulotomy with seton placement on October 10, 2022. The patient is currently scheduled for a completion fistulotomy on December 14, 2022. He is already scheduled for this operation and has all his preoperative information. The patient states that since his previous visit he has been doing well. He states several weeks ago, he had an instance of severe pain near the rectum. He states he was away on a work trip, so he went to a [facility] and purchased Tucks pads with lidocaine. He states he utilized these for several days and the pain resolved. He states the pain was severe enough to prevent him from sleeping at night. He has had no pain since. The patient states he continues to have daily drainage from his wound. He denies fevers or chills. He is having regular bowel movements. Clinical exam was limited to external exam only. He has a well-healed fistulotomy site in the left anterior lateral location. There is no active drainage from the wound site. The wound has almost entirely healed. His seton remains in place. Overall, the patient is doing well following his complex fistulotomy with seton placement on October 10, 2022. The patient is currently scheduled for a completion fistulotomy on December 14, 2022. We explained to the patient that it is normal to continue having drainage from the wound until it is entirely healed. I anticipate that he will continue to have drainage for 4 to 6 weeks following the second operation. The patient has already obtained all of his preoperative information. The patient expressed understanding with the above plan. All questions and concerns were addressed.

## 2022-11-30 ENCOUNTER — TELEPHONE (OUTPATIENT)
Facility: LOCATION | Age: 62
End: 2022-11-30

## 2022-12-11 ENCOUNTER — HOSPITAL ENCOUNTER (OUTPATIENT)
Age: 62
Discharge: HOME | End: 2022-12-11
Payer: COMMERCIAL

## 2022-12-11 VITALS
RESPIRATION RATE: 18 BRPM | SYSTOLIC BLOOD PRESSURE: 131 MMHG | DIASTOLIC BLOOD PRESSURE: 76 MMHG | HEART RATE: 75 BPM | TEMPERATURE: 98 F | OXYGEN SATURATION: 100 %

## 2022-12-11 DIAGNOSIS — J32.9 SINOBRONCHITIS: Primary | ICD-10-CM

## 2022-12-11 DIAGNOSIS — J40 SINOBRONCHITIS: Primary | ICD-10-CM

## 2022-12-11 LAB — SARS-COV-2 RNA RESP QL NAA+PROBE: NOT DETECTED

## 2022-12-11 PROCEDURE — U0002 COVID-19 LAB TEST NON-CDC: HCPCS | Performed by: PHYSICIAN ASSISTANT

## 2022-12-11 PROCEDURE — 99213 OFFICE O/P EST LOW 20 MIN: CPT | Performed by: PHYSICIAN ASSISTANT

## 2022-12-11 RX ORDER — CLARITHROMYCIN 500 MG/1
500 TABLET, COATED ORAL 2 TIMES DAILY
Qty: 20 TABLET | Refills: 0 | Status: SHIPPED | OUTPATIENT
Start: 2022-12-11 | End: 2022-12-21

## 2022-12-11 RX ORDER — METHYLPREDNISOLONE 4 MG/1
TABLET ORAL
Qty: 1 EACH | Refills: 0 | Status: SHIPPED | OUTPATIENT
Start: 2022-12-11

## 2022-12-12 VITALS — WEIGHT: 230 LBS | HEIGHT: 72 IN | BODY MASS INDEX: 31.15 KG/M2

## 2022-12-14 ENCOUNTER — HOSPITAL ENCOUNTER (OUTPATIENT)
Facility: HOSPITAL | Age: 62
Setting detail: HOSPITAL OUTPATIENT SURGERY
Discharge: HOME | End: 2022-12-14
Attending: COLON & RECTAL SURGERY | Admitting: COLON & RECTAL SURGERY
Payer: COMMERCIAL

## 2022-12-14 ENCOUNTER — TELEPHONE (OUTPATIENT)
Facility: LOCATION | Age: 62
End: 2022-12-14

## 2022-12-14 RX ORDER — HEPARIN SODIUM 5000 [USP'U]/ML
5000 INJECTION, SOLUTION INTRAVENOUS; SUBCUTANEOUS ONCE
Status: DISCONTINUED | OUTPATIENT
Start: 2022-12-14 | End: 2022-12-14

## 2022-12-14 RX ORDER — ACETAMINOPHEN 500 MG
1000 TABLET ORAL ONCE
Status: DISCONTINUED | OUTPATIENT
Start: 2022-12-14 | End: 2022-12-14

## 2022-12-14 RX ORDER — SODIUM CHLORIDE, SODIUM LACTATE, POTASSIUM CHLORIDE, CALCIUM CHLORIDE 600; 310; 30; 20 MG/100ML; MG/100ML; MG/100ML; MG/100ML
INJECTION, SOLUTION INTRAVENOUS CONTINUOUS
Status: DISCONTINUED | OUTPATIENT
Start: 2022-12-14 | End: 2022-12-14

## 2022-12-14 RX ORDER — SCOLOPAMINE TRANSDERMAL SYSTEM 1 MG/1
1 PATCH, EXTENDED RELEASE TRANSDERMAL ONCE
Status: DISCONTINUED | OUTPATIENT
Start: 2022-12-14 | End: 2022-12-14

## 2022-12-14 NOTE — TELEPHONE ENCOUNTER
Just spoke with the patient regarding his seton. He believes it fell out on Monday. I instructed him to still come to the hospital for his operation today. We will evaluate him in holding. If the seton is gone, then he will not need an operation. He expressed understanding with the above plan.

## 2022-12-16 ENCOUNTER — TELEMEDICINE (OUTPATIENT)
Dept: FAMILY MEDICINE CLINIC | Facility: CLINIC | Age: 62
End: 2022-12-16
Payer: COMMERCIAL

## 2022-12-16 DIAGNOSIS — J01.00 ACUTE MAXILLARY SINUSITIS, RECURRENCE NOT SPECIFIED: Primary | ICD-10-CM

## 2022-12-16 PROCEDURE — 99213 OFFICE O/P EST LOW 20 MIN: CPT | Performed by: FAMILY MEDICINE

## 2022-12-16 RX ORDER — PREDNISONE 20 MG/1
20 TABLET ORAL 2 TIMES DAILY
Qty: 10 TABLET | Refills: 0 | Status: SHIPPED | OUTPATIENT
Start: 2022-12-16 | End: 2022-12-21

## 2022-12-16 RX ORDER — AZITHROMYCIN 250 MG/1
TABLET, FILM COATED ORAL
Qty: 6 TABLET | Refills: 0 | Status: SHIPPED | OUTPATIENT
Start: 2022-12-16 | End: 2022-12-21

## 2023-01-12 ENCOUNTER — OFFICE VISIT (OUTPATIENT)
Facility: LOCATION | Age: 63
End: 2023-01-12

## 2023-01-12 DIAGNOSIS — K60.3 ANAL FISTULA: Primary | ICD-10-CM

## 2023-01-12 DIAGNOSIS — Z98.890 POST-OPERATIVE STATE: ICD-10-CM

## 2023-01-12 PROCEDURE — 99024 POSTOP FOLLOW-UP VISIT: CPT

## 2023-02-21 ENCOUNTER — OFFICE VISIT (OUTPATIENT)
Facility: LOCATION | Age: 63
End: 2023-02-21
Payer: COMMERCIAL

## 2023-02-21 VITALS — TEMPERATURE: 97 F | HEART RATE: 95 BPM

## 2023-02-21 DIAGNOSIS — L29.0 PRURITUS ANI: ICD-10-CM

## 2023-02-21 DIAGNOSIS — K60.3 ANAL FISTULA: Primary | ICD-10-CM

## 2023-02-21 PROCEDURE — 99213 OFFICE O/P EST LOW 20 MIN: CPT | Performed by: COLON & RECTAL SURGERY

## 2023-02-21 PROCEDURE — 17250 CHEM CAUT OF GRANLTJ TISSUE: CPT | Performed by: COLON & RECTAL SURGERY

## 2023-03-21 ENCOUNTER — OFFICE VISIT (OUTPATIENT)
Facility: LOCATION | Age: 63
End: 2023-03-21
Payer: COMMERCIAL

## 2023-03-21 VITALS — HEART RATE: 84 BPM | TEMPERATURE: 97 F

## 2023-03-21 DIAGNOSIS — T81.89XS NONHEALING SURGICAL WOUND, SEQUELA: ICD-10-CM

## 2023-03-21 DIAGNOSIS — K60.3 ANAL FISTULA: Primary | ICD-10-CM

## 2023-03-21 PROCEDURE — 17250 CHEM CAUT OF GRANLTJ TISSUE: CPT | Performed by: COLON & RECTAL SURGERY

## 2023-03-21 PROCEDURE — 99213 OFFICE O/P EST LOW 20 MIN: CPT | Performed by: COLON & RECTAL SURGERY

## 2023-03-23 PROBLEM — T81.89XS NONHEALING SURGICAL WOUND, SEQUELA: Status: ACTIVE | Noted: 2023-03-23

## 2023-03-23 NOTE — PATIENT INSTRUCTIONS
This patient has a nonhealing surgical wound after complex anal fistulotomy was performed. The patient had a seton placed in July 2022. The seton worked its way out on its own without completion fistulotomy by December. He now has a wound that has taken its time to close. He has no fever or chills. Has no pain. He does have some very slight discharge. He is having normal bowel habits. He has not had any complications at the wound site other than a slight few drops of drainage. Clinical exam limited to external exam and digital rectal exam reveals him to have 4/4 sphincter tone at both basal and MAC squeeze pressures. There is a nonhealing portion of the long fistulotomy tract at the 11 o'clock position. It is a 4 cm tract, the middle 4 mm have not yet filled and healed. There is some slight granulation tissue. I took the opportunity today to apply silver nitrate. I will see this patient again in 4/17/2023.

## 2023-04-17 ENCOUNTER — OFFICE VISIT (OUTPATIENT)
Facility: LOCATION | Age: 63
End: 2023-04-17
Payer: COMMERCIAL

## 2023-04-17 DIAGNOSIS — T81.89XS NONHEALING SURGICAL WOUND, SEQUELA: ICD-10-CM

## 2023-04-17 DIAGNOSIS — K60.3 ANAL FISTULA: Primary | ICD-10-CM

## 2023-04-17 NOTE — PATIENT INSTRUCTIONS
This patient continues to have a nonhealing wound at the site of a prior complex anal fistulotomy. His last fistulotomy treatment was in October 2022, in the operating room. He continues to have some slight drainage at the site. He has no significant pain. He states he does have to wear a slight pad at times. He has no fever or chills. He has no nausea or vomiting. He has no diarrhea or constipation. He has had no evidence of recurrence of the anal abscess or fistula. Clinical exam including external exam reveals him to have a left posterolateral fistula tract that begins inside the anal canal at the anoderm and extends out the 10 o'clock position from the posterior midline. There is a 2 to 3 mm very small nonhealing section of the wound with a slight web over the top. I took the opportunity today to apply silver nitrate to this region. I did probe the region with a fistula S probe. It does not track anywhere, and is limited to the small little tiny space without a fistula. I will see his patient again in 6 weeks. I would like to see this wound completely closed.

## 2023-06-05 ENCOUNTER — OFFICE VISIT (OUTPATIENT)
Facility: LOCATION | Age: 63
End: 2023-06-05
Payer: COMMERCIAL

## 2023-06-05 VITALS — TEMPERATURE: 99 F | HEART RATE: 77 BPM

## 2023-06-05 DIAGNOSIS — T81.89XS NONHEALING SURGICAL WOUND, SEQUELA: Primary | ICD-10-CM

## 2023-06-05 DIAGNOSIS — K60.3 ANAL FISTULA: ICD-10-CM

## 2023-06-05 NOTE — PATIENT INSTRUCTIONS
This patient presents for 6-week follow-up and continued care for a nonhealing left posterolateral complex anal fistulotomy site. It has been nearly a year since the original operation for the fistulotomy after an abscess was discovered in the left posterolateral aspect of the anal canal.    He states that is still dripping small amounts of blood. He denies any significant pain. He has not had any recurrence of the abscess. Clinical exam today reveals a small dot of drainage from the midportion of a 6 cm fistulotomy site in the left posterolateral aspect of the anal canal.  It turns out that this is the exit pinhole from a larger subcutaneous pocket of granulation tissue. I was able to open this up with sharp scissors in the office. This exposed the granulation bed. I applied silver nitrate. With the wound now confluent with the entire granulation bed, this should now regranulate from the base to the surface. This will allow total wound closure. This will also prevent reformation of the abscess cavity. I will see this patient again in 6 weeks for further care and treatment.

## 2023-07-18 ENCOUNTER — OFFICE VISIT (OUTPATIENT)
Facility: LOCATION | Age: 63
End: 2023-07-18
Payer: COMMERCIAL

## 2023-07-18 DIAGNOSIS — T81.89XS NONHEALING SURGICAL WOUND, SEQUELA: Primary | ICD-10-CM

## 2023-07-18 DIAGNOSIS — K60.3 ANAL FISTULA: ICD-10-CM

## 2023-07-18 PROCEDURE — 99213 OFFICE O/P EST LOW 20 MIN: CPT | Performed by: COLON & RECTAL SURGERY

## 2023-07-18 NOTE — PROGRESS NOTES
Follow Up Visit Note       Active Problems      1. Nonhealing surgical wound, sequela    2. Anal fistula          Chief Complaint   Patient presents with:  Anal Problem:  1month follow up for anal wound check. Pt denies any pain or bleeding         History of Present Illness  The patient presents for 6-week follow-up and continued care for a nonhealing left posterior lateral complex anal fistulotomy site. It has been approximately 1 year since his initial operation. At the patient's previous visit, I discovered on exam that he had a subcutaneous pocket of granulation tissue, so I opened the pocket with scissors, expose the granulation bed and applied silver nitrate. At today's visit, the patient states he thinks the wound has entirely healed. He states he wore gauze for several days following his last visit, but has not had any drainage or bleeding recently. He denies diarrhea or constipation. He has good control of gas and stool. I acted as a scribe in this encounter. The physician obtained a history, independently performed a physical exam, and developed an assessment and plan. The physician performed all medical decision making. Livier Green PA-C    I agree with the note as scribed by the PA  I performed my own physical exam and obtained the history as detailed above. I have made all appropriate changes in documentation as necessary  I attest to the accuracy of this note as detailed above    Prabhu Kennedy MD FACS FASCRS    My total time spent with this patient on today's visit was 20 minutes.   This includes time in preparation, obtaining and reviewing history, performing the examination, counseling and education, ordering tests, referring and communicating with other healthcare professionals, documenting clinical information, independently interpreting results, coordinating care, and communication of any results that were available at today's visit. Allergies  Lisa Limon has No Known Allergies. Past Medical / Surgical / Social / Family History    The past medical and past surgical history have been reviewed by me today. Past Medical History:   Diagnosis Date    Acute bronchitis 03/08/2011    Acute bronchospasm 03/08/2011    Anxiety state, unspecified     Chest pain, unspecified 03/17/2011    High blood pressure     Nonspecific abnormal electrocardiogram (ECG) (EKG) 03/17/2012    Other abnormal blood chemistry 01/31/2011    Sleep disturbance, unspecified 01/31/2011    Visual impairment     CONTACTS GLASSES     Past Surgical History:   Procedure Laterality Date    COLONOSCOPY N/A 12/14/2018    Procedure: COLONOSCOPY;  Surgeon: Daphne Oviedo DO;  Location: [facility] ENDOSCOPY       The family history and social history have been reviewed by me today. Family History   Problem Relation Age of Onset    Heart Disease Father     Cancer Other         Grandfather with Lung cancer    Stroke Other         Grandfather      Social History    Socioeconomic History      Marital status:     Tobacco Use      Smoking status: Never      Smokeless tobacco: Never      Tobacco comments: non-smoker    Vaping Use      Vaping Use: Never used    Substance and Sexual Activity      Alcohol use: No        Alcohol/week: 0.0 standard drinks of alcohol      Drug use: No       Current Outpatient Medications:     methylPREDNISolone (MEDROL) 4 MG Oral Tablet Therapy Pack, Dosepack: take as directed, Disp: 1 each, Rfl: 0    diclofenac 75 MG Oral Tab EC, Take 75 mg by mouth as needed. , Disp: , Rfl:     ustekinumab (STELARA) 90 MG/ML Subcutaneous Solution Prefilled Syringe injection, Inject 1 syringe into the skin every 12 weeks, Disp: 1 mL, Rfl: 1    Multiple Vitamins-Minerals (MULTIVITAL-M) Oral Tab, Take by mouth., Disp: , Rfl:     Ascorbic Acid (VITAMIN C) 1000 MG Oral Tab, Take 1,000 mg by mouth daily. , Disp: , Rfl:      Review of Systems  The Review of Systems has been reviewed by me during today. Review of Systems   Constitutional:  Negative for chills, diaphoresis, fatigue, fever and unexpected weight change. HENT:  Negative for hearing loss, nosebleeds, sore throat and trouble swallowing. Respiratory:  Negative for apnea, cough, shortness of breath and wheezing. Cardiovascular:  Negative for chest pain, palpitations and leg swelling. Gastrointestinal:  Negative for abdominal distention, abdominal pain, anal bleeding, blood in stool, constipation, diarrhea, nausea and vomiting. Genitourinary:  Negative for difficulty urinating, dysuria, frequency and urgency. Musculoskeletal:  Negative for arthralgias and myalgias. Skin:  Negative for color change and rash. Neurological:  Negative for tremors, syncope and weakness. Hematological:  Negative for adenopathy. Does not bruise/bleed easily. Psychiatric/Behavioral:  Negative for behavioral problems and sleep disturbance. Physical Findings   There were no vitals taken for this visit. Physical Exam  Constitutional:       Appearance: Normal appearance. He is normal weight. HENT:      Head: Normocephalic and atraumatic. Right Ear: External ear normal.      Left Ear: External ear normal.      Nose: Nose normal.   Eyes:      Conjunctiva/sclera: Conjunctivae normal.   Genitourinary:     Comments: Clinical exam was limited to external exam only. It reveals his left posterior lateral fistulotomy site to be entirely healed. There are no external hemorrhoids, fissures or abscesses. Neurological:      Mental Status: He is alert. Psychiatric:         Mood and Affect: Mood normal.         Behavior: Behavior normal.          Assessment   Nonhealing surgical wound, sequela  (primary encounter diagnosis)  Anal fistula    Plan   The patient presents for 6-week follow-up and continued care for a nonhealing left posterior lateral complex anal fistulotomy site.   It has been approximately 1 year since his initial operation. At the patient's previous visit, I discovered on exam that he had a subcutaneous pocket of granulation tissue, so I opened the pocket with scissors, expose the granulation bed and applied silver nitrate. At today's visit, the patient states he thinks the wound has entirely healed. He states he wore gauze for several days following his last visit, but has not had any drainage or bleeding recently. He denies diarrhea or constipation. He has good control of gas and stool. Clinical exam was limited to external exam only. It reveals his left posterior lateral fistulotomy site to be entirely healed. There are no external hemorrhoids, fissures or abscesses. The patient's fistulotomy site is entirely healed at this time. He does not require any further follow-up with me. I will see him on an as-needed basis in the future. No orders of the defined types were placed in this encounter. Imaging & Referrals   None    Follow Up  No follow-ups on file.     Garcia Puri MD

## 2023-09-12 ENCOUNTER — PATIENT MESSAGE (OUTPATIENT)
Dept: FAMILY MEDICINE CLINIC | Facility: CLINIC | Age: 63
End: 2023-09-12

## 2023-09-19 ENCOUNTER — OFFICE VISIT (OUTPATIENT)
Dept: FAMILY MEDICINE CLINIC | Facility: CLINIC | Age: 63
End: 2023-09-19
Payer: COMMERCIAL

## 2023-09-19 VITALS
HEIGHT: 72 IN | HEART RATE: 86 BPM | DIASTOLIC BLOOD PRESSURE: 80 MMHG | RESPIRATION RATE: 18 BRPM | SYSTOLIC BLOOD PRESSURE: 120 MMHG | WEIGHT: 200 LBS | BODY MASS INDEX: 27.09 KG/M2 | OXYGEN SATURATION: 98 % | TEMPERATURE: 97 F

## 2023-09-19 DIAGNOSIS — Z12.5 PROSTATE CANCER SCREENING: ICD-10-CM

## 2023-09-19 DIAGNOSIS — Z00.00 ANNUAL PHYSICAL EXAM: Primary | ICD-10-CM

## 2023-09-19 DIAGNOSIS — R39.198 DIFFICULTY IN URINATION: ICD-10-CM

## 2023-09-19 DIAGNOSIS — Z12.11 ENCOUNTER FOR SCREENING COLONOSCOPY: ICD-10-CM

## 2023-09-19 DIAGNOSIS — K61.0 ANAL ABSCESS: ICD-10-CM

## 2023-09-19 DIAGNOSIS — E78.5 HYPERLIPIDEMIA, UNSPECIFIED HYPERLIPIDEMIA TYPE: ICD-10-CM

## 2023-09-19 DIAGNOSIS — G47.00 INSOMNIA, UNSPECIFIED TYPE: ICD-10-CM

## 2023-09-19 DIAGNOSIS — Z13.6 SCREENING FOR CARDIOVASCULAR CONDITION: ICD-10-CM

## 2023-09-19 PROCEDURE — 3079F DIAST BP 80-89 MM HG: CPT | Performed by: FAMILY MEDICINE

## 2023-09-19 PROCEDURE — 3074F SYST BP LT 130 MM HG: CPT | Performed by: FAMILY MEDICINE

## 2023-09-19 PROCEDURE — 3008F BODY MASS INDEX DOCD: CPT | Performed by: FAMILY MEDICINE

## 2023-09-19 PROCEDURE — 99396 PREV VISIT EST AGE 40-64: CPT | Performed by: FAMILY MEDICINE

## 2023-09-19 RX ORDER — TRAZODONE HYDROCHLORIDE 50 MG/1
25 TABLET ORAL NIGHTLY
Qty: 45 TABLET | Refills: 0 | Status: SHIPPED | OUTPATIENT
Start: 2023-09-19 | End: 2023-12-18

## 2023-09-29 ENCOUNTER — OFFICE VISIT (OUTPATIENT)
Dept: SURGERY | Facility: CLINIC | Age: 63
End: 2023-09-29

## 2023-09-29 DIAGNOSIS — R82.90 URINE FINDING: Primary | ICD-10-CM

## 2023-09-29 LAB
APPEARANCE: CLEAR
BILIRUBIN: NEGATIVE
GLUCOSE (URINE DIPSTICK): NEGATIVE MG/DL
LEUKOCYTES: NEGATIVE
MULTISTIX LOT#: NORMAL NUMERIC
NITRITE, URINE: NEGATIVE
OCCULT BLOOD: NEGATIVE
PH, URINE: 5.5 (ref 4.5–8)
PROTEIN (URINE DIPSTICK): NEGATIVE MG/DL
SPECIFIC GRAVITY: 1.01 (ref 1–1.03)
URINE-COLOR: YELLOW
UROBILINOGEN,SEMI-QN: 0.2 MG/DL (ref 0–1.9)

## 2023-09-29 PROCEDURE — 99243 OFF/OP CNSLTJ NEW/EST LOW 30: CPT | Performed by: UROLOGY

## 2023-09-29 PROCEDURE — 81003 URINALYSIS AUTO W/O SCOPE: CPT | Performed by: UROLOGY

## 2023-09-29 NOTE — PROGRESS NOTES
Urology Clinic Note - New Patient    Referring Provider:  No referring provider defined for this encounter. Primary Care Provider:  Xander Ragsdale MD     Chief Complaint:   LUTS , primarily difficulty emptying at night time     HPI:     Mabel White is a 66year old male with history of anxiety, HTN referred for voiding complaints at night. Patient reports that over the past year he has noticed having to strain to urinate at night time; takes a couple minutes to empty bladder when this happens. He voids 1-2 times nightly and is not bothered by this frequency. He has no day time symptoms. AUA SS is 8/35. He has not previously seen a urologist.   Denies any dysuria or hematuria. No kidney stone or UTI history. CT pelvis 7/2022 for buttock abscess; prostate mildly enlarged. Drinks large coffee in morning, no alcohol. Does not snore. UA today negative  PVR 3cc         PSA:  Lab Results   Component Value Date    PSA 0.926 11/26/2013    PSAS 2.22 09/24/2021    PSAS 3.27 06/25/2020    PSAS 2.26 05/16/2019    PSAS 1.79 11/15/2018    PSAS 1.82 07/24/2017      Last Cr was 1.31 done on 7/22/2022. Last GFR (ASHELY) was 59 done on 7/22/2022.       History:     Past Medical History:   Diagnosis Date    Acute bronchitis 03/08/2011    Acute bronchospasm 03/08/2011    Anxiety state, unspecified     Chest pain, unspecified 03/17/2011    High blood pressure     Nonspecific abnormal electrocardiogram (ECG) (EKG) 03/17/2012    Other abnormal blood chemistry 01/31/2011    Sleep disturbance, unspecified 01/31/2011    Visual impairment     CONTACTS GLASSES       Past Surgical History:   Procedure Laterality Date    COLONOSCOPY N/A 12/14/2018    Procedure: COLONOSCOPY;  Surgeon: Felipe Ortiz DO;  Location: [address] [place] [address]       Family History   Problem Relation Age of Onset    Heart Disease Father     Cancer Other         Grandfather with Lung cancer    Stroke Other         Grandfather        Social History Socioeconomic History    Marital status:    Tobacco Use    Smoking status: Never    Smokeless tobacco: Never    Tobacco comments:     non-smoker   Vaping Use    Vaping Use: Never used   Substance and Sexual Activity    Alcohol use: No     Alcohol/week: 0.0 standard drinks of alcohol    Drug use: No       Medications (Active prior to today's visit):  Current Outpatient Medications   Medication Sig Dispense Refill    traZODone 50 MG Oral Tab Take 0.5 tablets (25 mg total) by mouth nightly. 45 tablet 0    diclofenac 75 MG Oral Tab EC Take 1 tablet (75 mg total) by mouth as needed. ustekinumab (STELARA) 90 MG/ML Subcutaneous Solution Prefilled Syringe injection Inject 1 syringe into the skin every 12 weeks 1 mL 1    Multiple Vitamins-Minerals (MULTIVITAL-M) Oral Tab Take by mouth. Ascorbic Acid (VITAMIN C) 1000 MG Oral Tab Take 1 tablet (1,000 mg total) by mouth daily. Allergies:  No Known Allergies      Review of Systems:   A comprehensive 10-point review of systems was completed. Pertinent positives and negatives are noted in the the HPI. Physical Exam:   CONSTITUTIONAL: Well developed, well nourished, in no acute distress  NEUROLOGIC: Alert and oriented  HEAD: Normocephalic, atraumatic  ENT: Hearing intact   RESPIRATORY: Normal respiratory effort  SKIN: No evident rashes  ABDOMEN: Soft, non-tender, non-distended  DIGITAL RECTAL EXAM: deferred per patient     No results found. Assessment & Plan:       Haydee Penn is a 66year old male with history of anxiety, HTN referred for voiding complaints at night. # LUTS. Primarily difficluty emptying at night time  - Discussed behavior modifications including decreasing PM fluids, voiding before bed, sitting to void. He will trial this. Given patient has only 1-2 void nightly and no day time symptoms will not start any medications at this time, but we did discuss potential addition of flomax in the future. PVR low. # PSA screening:   - Previously normal per PCP 2021- due for repeat and orders have been placed by PCP to be completed next month. Will discuss results at next visit. Patient deferred MARLENE for now. Thank you for this consult. I have personally reviewed all relevant medical records, labs, and imaging.        Arleen Hamlin MD  Staff Urologist  [facility]  Office: [phone]

## 2023-10-17 ENCOUNTER — LAB ENCOUNTER (OUTPATIENT)
Dept: LAB | Age: 63
End: 2023-10-17
Attending: FAMILY MEDICINE
Payer: COMMERCIAL

## 2023-10-17 DIAGNOSIS — Z13.6 SCREENING FOR CARDIOVASCULAR CONDITION: ICD-10-CM

## 2023-10-17 DIAGNOSIS — G47.00 INSOMNIA, UNSPECIFIED TYPE: ICD-10-CM

## 2023-10-17 DIAGNOSIS — Z00.00 ANNUAL PHYSICAL EXAM: ICD-10-CM

## 2023-10-17 DIAGNOSIS — E78.5 HYPERLIPIDEMIA, UNSPECIFIED HYPERLIPIDEMIA TYPE: ICD-10-CM

## 2023-10-17 DIAGNOSIS — Z12.5 PROSTATE CANCER SCREENING: ICD-10-CM

## 2023-10-17 LAB
ALBUMIN SERPL-MCNC: 4 G/DL (ref 3.4–5)
ALBUMIN/GLOB SERPL: 1.3 {RATIO} (ref 1–2)
ALP LIVER SERPL-CCNC: 63 U/L
ALT SERPL-CCNC: 31 U/L
ANION GAP SERPL CALC-SCNC: 5 MMOL/L (ref 0–18)
AST SERPL-CCNC: 20 U/L (ref 15–37)
BILIRUB SERPL-MCNC: 2.3 MG/DL (ref 0.1–2)
BUN BLD-MCNC: 19 MG/DL (ref 7–18)
CALCIUM BLD-MCNC: 8.8 MG/DL (ref 8.5–10.1)
CHLORIDE SERPL-SCNC: 104 MMOL/L (ref 98–112)
CHOLEST SERPL-MCNC: 172 MG/DL (ref ?–200)
CO2 SERPL-SCNC: 29 MMOL/L (ref 21–32)
COMPLEXED PSA SERPL-MCNC: 3.22 NG/ML (ref ?–4)
CREAT BLD-MCNC: 1.25 MG/DL
EGFRCR SERPLBLD CKD-EPI 2021: 66 ML/MIN/1.73M2 (ref 60–?)
ERYTHROCYTE [DISTWIDTH] IN BLOOD BY AUTOMATED COUNT: 12.2 %
FASTING PATIENT LIPID ANSWER: YES
FASTING STATUS PATIENT QL REPORTED: YES
GLOBULIN PLAS-MCNC: 3.2 G/DL (ref 2.8–4.4)
GLUCOSE BLD-MCNC: 76 MG/DL (ref 70–99)
HCT VFR BLD AUTO: 40.7 %
HDLC SERPL-MCNC: 41 MG/DL (ref 40–59)
HGB BLD-MCNC: 13.9 G/DL
LDLC SERPL CALC-MCNC: 116 MG/DL (ref ?–100)
MCH RBC QN AUTO: 29.7 PG (ref 26–34)
MCHC RBC AUTO-ENTMCNC: 34.2 G/DL (ref 31–37)
MCV RBC AUTO: 87 FL
NONHDLC SERPL-MCNC: 131 MG/DL (ref ?–130)
OSMOLALITY SERPL CALC.SUM OF ELEC: 287 MOSM/KG (ref 275–295)
PLATELET # BLD AUTO: 333 10(3)UL (ref 150–450)
POTASSIUM SERPL-SCNC: 4.5 MMOL/L (ref 3.5–5.1)
PROT SERPL-MCNC: 7.2 G/DL (ref 6.4–8.2)
RBC # BLD AUTO: 4.68 X10(6)UL
SODIUM SERPL-SCNC: 138 MMOL/L (ref 136–145)
TRIGL SERPL-MCNC: 80 MG/DL (ref 30–149)
TSI SER-ACNC: 1.65 MIU/ML (ref 0.36–3.74)
VLDLC SERPL CALC-MCNC: 14 MG/DL (ref 0–30)
WBC # BLD AUTO: 6.6 X10(3) UL (ref 4–11)

## 2023-10-17 PROCEDURE — 84443 ASSAY THYROID STIM HORMONE: CPT | Performed by: FAMILY MEDICINE

## 2023-10-17 PROCEDURE — 80053 COMPREHEN METABOLIC PANEL: CPT | Performed by: FAMILY MEDICINE

## 2023-10-17 PROCEDURE — 80061 LIPID PANEL: CPT | Performed by: FAMILY MEDICINE

## 2023-10-17 PROCEDURE — 85027 COMPLETE CBC AUTOMATED: CPT | Performed by: FAMILY MEDICINE

## 2023-10-17 PROCEDURE — 84153 ASSAY OF PSA TOTAL: CPT | Performed by: FAMILY MEDICINE

## 2023-10-18 ENCOUNTER — OFFICE VISIT (OUTPATIENT)
Dept: FAMILY MEDICINE CLINIC | Facility: CLINIC | Age: 63
End: 2023-10-18
Payer: COMMERCIAL

## 2023-10-18 ENCOUNTER — TELEPHONE (OUTPATIENT)
Dept: FAMILY MEDICINE CLINIC | Facility: CLINIC | Age: 63
End: 2023-10-18

## 2023-10-18 VITALS
TEMPERATURE: 98 F | OXYGEN SATURATION: 98 % | DIASTOLIC BLOOD PRESSURE: 82 MMHG | HEIGHT: 72 IN | HEART RATE: 82 BPM | WEIGHT: 201 LBS | SYSTOLIC BLOOD PRESSURE: 126 MMHG | BODY MASS INDEX: 27.22 KG/M2

## 2023-10-18 DIAGNOSIS — H53.9 VISUAL CHANGES: Primary | ICD-10-CM

## 2023-10-18 PROCEDURE — 3079F DIAST BP 80-89 MM HG: CPT | Performed by: NURSE PRACTITIONER

## 2023-10-18 PROCEDURE — 99213 OFFICE O/P EST LOW 20 MIN: CPT | Performed by: NURSE PRACTITIONER

## 2023-10-18 PROCEDURE — 3008F BODY MASS INDEX DOCD: CPT | Performed by: NURSE PRACTITIONER

## 2023-10-18 PROCEDURE — 3074F SYST BP LT 130 MM HG: CPT | Performed by: NURSE PRACTITIONER

## 2023-10-18 NOTE — TELEPHONE ENCOUNTER
----- Message from DAVID Decker sent at 10/18/2023 11:41 AM CDT -----  Cholesterol normal except for mildly increased LDL. Improved from previous. Continue to work on heart healthy diet  Chemistries stable  PSA normal, slight increase from last year.  I would check again in 6 months for close monitoring  Thyroid normal  No anemia

## 2023-10-18 NOTE — TELEPHONE ENCOUNTER
You are seeing him today. Do you still want me to call?     Future Appointments   Date Time Provider Juan Moore   10/18/2023  1:00 PM DAVID Gonzales [facility]   11/9/2023  4:30 PM Anupam Resendez DO [facility] [place]

## 2023-10-18 NOTE — PROGRESS NOTES
We discussed his recent lab work from his physical.  Discussed and evaluated with student NP.  Agree with assessment and plan

## 2023-10-31 ENCOUNTER — PATIENT OUTREACH (OUTPATIENT)
Dept: CASE MANAGEMENT | Age: 63
End: 2023-10-31

## 2023-10-31 NOTE — PROCEDURES
The office order for Hypertension registry removal request is Approved and finalized on October 31, 2023.     Thanks,  [facility] [employer]

## 2023-11-09 ENCOUNTER — OFFICE VISIT (OUTPATIENT)
Dept: SURGERY | Facility: CLINIC | Age: 63
End: 2023-11-09
Payer: COMMERCIAL

## 2023-11-09 VITALS — HEART RATE: 79 BPM | TEMPERATURE: 97 F

## 2023-11-09 DIAGNOSIS — K63.5 POLYP OF COLON, UNSPECIFIED PART OF COLON, UNSPECIFIED TYPE: Primary | ICD-10-CM

## 2023-11-09 PROCEDURE — 99243 OFF/OP CNSLTJ NEW/EST LOW 30: CPT | Performed by: SURGERY

## 2023-11-09 RX ORDER — POLYETHYLENE GLYCOL 3350, SODIUM CHLORIDE, SODIUM BICARBONATE, POTASSIUM CHLORIDE 420; 11.2; 5.72; 1.48 G/4L; G/4L; G/4L; G/4L
POWDER, FOR SOLUTION ORAL
Qty: 1 EACH | Refills: 0 | Status: SHIPPED | OUTPATIENT
Start: 2023-11-09

## 2023-12-18 ENCOUNTER — OFFICE VISIT (OUTPATIENT)
Facility: LOCATION | Age: 63
End: 2023-12-18
Payer: COMMERCIAL

## 2023-12-18 VITALS — HEART RATE: 87 BPM | TEMPERATURE: 98 F

## 2023-12-18 DIAGNOSIS — K61.0 PERIANAL ABSCESS: Primary | ICD-10-CM

## 2023-12-20 DIAGNOSIS — G47.00 INSOMNIA, UNSPECIFIED TYPE: ICD-10-CM

## 2023-12-21 RX ORDER — TRAZODONE HYDROCHLORIDE 50 MG/1
25 TABLET ORAL NIGHTLY
Qty: 45 TABLET | Refills: 3 | Status: SHIPPED | OUTPATIENT
Start: 2023-12-21

## 2023-12-21 NOTE — TELEPHONE ENCOUNTER
LOV: 10/18/23 for visual changes    traZODone 50 MG Oral Tab ()  Take 0.5 tablets (25 mg total) by mouth nightly. Dispense: 45 tablet, Refills: 0 ordered   2023     No future appointments.   Please advise

## 2024-01-15 ENCOUNTER — TELEPHONE (OUTPATIENT)
Facility: LOCATION | Age: 64
End: 2024-01-15

## 2024-01-15 NOTE — TELEPHONE ENCOUNTER
Transaction ID: [id number] ID: [id number] Date: 2024-01-15  LUIS ALBERTO JONES Patient  Member ID  [id number]    Date of Birth  1960-02-23    Gender  Male    Transaction Type  Outpatient Authorization    Organization  [facility]    Payer  [facility] logo     Certificate Information  Reference Number  [id number]    Status  NO ACTION REQUIRED    Message  Requested Service does not require preauthorization. We would strongly encourage you to check benefits for this service.    Member Information  Patient Name  LUIS ALBERTO JONES    Patient Date of Birth  1960-02-23    Patient Gender  Male    Member ID  [id number]    Relationship to Subscriber  Self    Subscriber Name  LUIS ALBERTO JONES    Requesting Provider     Name  KAYLAN NGUYEN    NPI  [id number]    Tax Id  [id number]    Specialty  [id number]  Provider Role  Provider    Address  [address] [place] [address]    Phone  [phone]  Fax  [phone]    Contact Name  HANNAH JONES    Service Information  Service Type  2 - Surgical    Place of Service  22 - [facility]    Service From - To Date  2024-01-25 - 2024-02-28    Level of Service  Elective    Diagnosis Code 1  K610 - Anal abscess    Procedure Code 1 (CPT/HCPCS)  95058 - SURG DX EXAM ANORECTAL    Quantity  1 Units    Status  NO ACTION REQUIRED    Procedure Code 2 (CPT/HCPCS)  55331 - INCISION OF RECTAL ABSCESS    Quantity  1 Units    Status  NO ACTION REQUIRED    Rendering Provider/Facility     Provider 1  Name  KAYLAN NGUYEN    NPI  [id number]    Specialty  [id number]  Provider Role  Attending    Address  [address] [place] [address]    Provider 2  Name  [facility]    NPI  [id number]    Provider Role  Facility    Address  [address] [place] [address]

## 2024-01-24 ENCOUNTER — ANESTHESIA EVENT (OUTPATIENT)
Dept: SURGERY | Facility: HOSPITAL | Age: 64
End: 2024-01-24
Payer: COMMERCIAL

## 2024-01-24 NOTE — ANESTHESIA PREPROCEDURE EVALUATION
PRE-OP EVALUATION    Patient Name: Seven Lake    Admit Diagnosis: Perianal abscess [K61.0]    Pre-op Diagnosis: Perianal abscess [K61.0]    ANAL EXAMINATION UNDER ANESTHESIA, DRAINAGE OF PERIANAL ABSCESS    Anesthesia Procedure: ANAL EXAMINATION UNDER ANESTHESIA, DRAINAGE OF PERIANAL ABSCESS    Surgeon(s) and Role:     * Dianne Suarez MD - Primary    Pre-op vitals reviewed.        Body mass index is 25.77 kg/m².    Current medications reviewed.  Hospital Medications:  No current facility-administered medications on file as of .       Outpatient Medications:     No medications prior to admission.       Allergies: Patient has no known allergies.      Anesthesia Evaluation    Patient summary reviewed.    Anesthetic Complications  (-) history of anesthetic complications         GI/Hepatic/Renal    Negative GI/hepatic/renal ROS.                             Cardiovascular        Exercise tolerance: good     MET: >4      (+) hypertension                                     Endo/Other    Negative endo/other ROS.                              Pulmonary      (+) asthma                     Neuro/Psych        (+) anxiety                              Past Surgical History:   Procedure Laterality Date    COLONOSCOPY N/A 12/14/2018    Procedure: COLONOSCOPY;  Surgeon: Brock Gillespie DO;  Location:  ENDOSCOPY    OTHER SURGICAL HISTORY      rectal 7/23 10/23     Social History     Socioeconomic History    Marital status:    Tobacco Use    Smoking status: Never    Smokeless tobacco: Never    Tobacco comments:     non-smoker   Vaping Use    Vaping Use: Never used   Substance and Sexual Activity    Alcohol use: No     Alcohol/week: 0.0 standard drinks of alcohol    Drug use: No     History   Drug Use No     Available pre-op labs reviewed.               Airway      Mallampati: II  Mouth opening: >3 FB  TM distance: 4 - 6 cm  Neck ROM: full Cardiovascular    Cardiovascular exam normal.          Dental    Dentition appears grossly intact         Pulmonary    Pulmonary exam normal.                 Other findings              ASA: 2   Plan: general  NPO status verified and patient meets guidelines.    Post-procedure pain management plan discussed with surgeon and patient.    Comment: Discussed r/b/a of general anesthesia including PONV, sore throat and hoarseness from airway manipulation, post-operative pain/discomfort, dental /lip injury, pressure/nerve injuries from positioning, recall, and other serious but rare complications. All questions answered, concerns addressed.      Plan/risks discussed with: patient and spouse  Use of blood product(s) discussed with: patient and spouse    Consented to blood products.          Present on Admission:  **None**

## 2024-01-25 ENCOUNTER — ANESTHESIA (OUTPATIENT)
Dept: SURGERY | Facility: HOSPITAL | Age: 64
End: 2024-01-25
Payer: COMMERCIAL

## 2024-01-25 ENCOUNTER — HOSPITAL ENCOUNTER (OUTPATIENT)
Facility: HOSPITAL | Age: 64
Setting detail: HOSPITAL OUTPATIENT SURGERY
Discharge: HOME | End: 2024-01-25
Attending: SURGERY | Admitting: SURGERY
Payer: COMMERCIAL

## 2024-01-25 VITALS
DIASTOLIC BLOOD PRESSURE: 74 MMHG | OXYGEN SATURATION: 100 % | WEIGHT: 196 LBS | HEART RATE: 77 BPM | SYSTOLIC BLOOD PRESSURE: 122 MMHG | TEMPERATURE: 98 F | RESPIRATION RATE: 18 BRPM | HEIGHT: 72 IN | BODY MASS INDEX: 26.55 KG/M2

## 2024-01-25 DIAGNOSIS — K61.0 ANAL ABSCESS: Primary | ICD-10-CM

## 2024-01-25 PROCEDURE — 0D9QXZZ DRAINAGE OF ANUS, EXTERNAL APPROACH: ICD-10-PCS | Performed by: SURGERY

## 2024-01-25 RX ORDER — NICOTINE POLACRILEX 4 MG
15 LOZENGE BUCCAL
Status: DISCONTINUED | OUTPATIENT
Start: 2024-01-25 | End: 2024-01-25

## 2024-01-25 RX ORDER — SODIUM CHLORIDE, SODIUM LACTATE, POTASSIUM CHLORIDE, CALCIUM CHLORIDE 600; 310; 30; 20 MG/100ML; MG/100ML; MG/100ML; MG/100ML
INJECTION, SOLUTION INTRAVENOUS CONTINUOUS
Status: DISCONTINUED | OUTPATIENT
Start: 2024-01-25 | End: 2024-01-25

## 2024-01-25 RX ORDER — ONDANSETRON 2 MG/ML
4 INJECTION INTRAMUSCULAR; INTRAVENOUS EVERY 6 HOURS PRN
Status: DISCONTINUED | OUTPATIENT
Start: 2024-01-25 | End: 2024-01-25

## 2024-01-25 RX ORDER — ACETAMINOPHEN 500 MG
1000 TABLET ORAL ONCE
Status: DISCONTINUED | OUTPATIENT
Start: 2024-01-25 | End: 2024-01-25 | Stop reason: HOSPADM

## 2024-01-25 RX ORDER — SCOLOPAMINE TRANSDERMAL SYSTEM 1 MG/1
1 PATCH, EXTENDED RELEASE TRANSDERMAL ONCE
Status: DISCONTINUED | OUTPATIENT
Start: 2024-01-25 | End: 2024-01-25 | Stop reason: HOSPADM

## 2024-01-25 RX ORDER — MIDAZOLAM HYDROCHLORIDE 1 MG/ML
INJECTION INTRAMUSCULAR; INTRAVENOUS AS NEEDED
Status: DISCONTINUED | OUTPATIENT
Start: 2024-01-25 | End: 2024-01-25 | Stop reason: SURG

## 2024-01-25 RX ORDER — CEFAZOLIN SODIUM/WATER 2 G/20 ML
2 SYRINGE (ML) INTRAVENOUS ONCE
Status: COMPLETED | OUTPATIENT
Start: 2024-01-25 | End: 2024-01-25

## 2024-01-25 RX ORDER — ONDANSETRON 2 MG/ML
INJECTION INTRAMUSCULAR; INTRAVENOUS AS NEEDED
Status: DISCONTINUED | OUTPATIENT
Start: 2024-01-25 | End: 2024-01-25 | Stop reason: SURG

## 2024-01-25 RX ORDER — METOCLOPRAMIDE HYDROCHLORIDE 5 MG/ML
INJECTION INTRAMUSCULAR; INTRAVENOUS AS NEEDED
Status: DISCONTINUED | OUTPATIENT
Start: 2024-01-25 | End: 2024-01-25 | Stop reason: SURG

## 2024-01-25 RX ORDER — ROCURONIUM BROMIDE 10 MG/ML
INJECTION, SOLUTION INTRAVENOUS AS NEEDED
Status: DISCONTINUED | OUTPATIENT
Start: 2024-01-25 | End: 2024-01-25 | Stop reason: SURG

## 2024-01-25 RX ORDER — NICOTINE POLACRILEX 4 MG
30 LOZENGE BUCCAL
Status: DISCONTINUED | OUTPATIENT
Start: 2024-01-25 | End: 2024-01-25

## 2024-01-25 RX ORDER — KETOROLAC TROMETHAMINE 30 MG/ML
INJECTION, SOLUTION INTRAMUSCULAR; INTRAVENOUS AS NEEDED
Status: DISCONTINUED | OUTPATIENT
Start: 2024-01-25 | End: 2024-01-25 | Stop reason: SURG

## 2024-01-25 RX ORDER — HYDROMORPHONE HYDROCHLORIDE 1 MG/ML
0.2 INJECTION, SOLUTION INTRAMUSCULAR; INTRAVENOUS; SUBCUTANEOUS EVERY 5 MIN PRN
Status: DISCONTINUED | OUTPATIENT
Start: 2024-01-25 | End: 2024-01-25

## 2024-01-25 RX ORDER — ACETAMINOPHEN 500 MG
1000 TABLET ORAL ONCE AS NEEDED
Status: DISCONTINUED | OUTPATIENT
Start: 2024-01-25 | End: 2024-01-25

## 2024-01-25 RX ORDER — LIDOCAINE HYDROCHLORIDE 10 MG/ML
INJECTION, SOLUTION EPIDURAL; INFILTRATION; INTRACAUDAL; PERINEURAL AS NEEDED
Status: DISCONTINUED | OUTPATIENT
Start: 2024-01-25 | End: 2024-01-25 | Stop reason: SURG

## 2024-01-25 RX ORDER — PROCHLORPERAZINE EDISYLATE 5 MG/ML
5 INJECTION INTRAMUSCULAR; INTRAVENOUS EVERY 8 HOURS PRN
Status: DISCONTINUED | OUTPATIENT
Start: 2024-01-25 | End: 2024-01-25

## 2024-01-25 RX ORDER — BUPIVACAINE HYDROCHLORIDE AND EPINEPHRINE 5; 5 MG/ML; UG/ML
INJECTION, SOLUTION EPIDURAL; INTRACAUDAL; PERINEURAL AS NEEDED
Status: DISCONTINUED | OUTPATIENT
Start: 2024-01-25 | End: 2024-01-25 | Stop reason: HOSPADM

## 2024-01-25 RX ORDER — HYDROCODONE BITARTRATE AND ACETAMINOPHEN 5; 325 MG/1; MG/1
1 TABLET ORAL EVERY 6 HOURS PRN
Qty: 20 TABLET | Refills: 0 | Status: SHIPPED | OUTPATIENT
Start: 2024-01-25

## 2024-01-25 RX ORDER — HYDROCODONE BITARTRATE AND ACETAMINOPHEN 5; 325 MG/1; MG/1
2 TABLET ORAL ONCE AS NEEDED
Status: DISCONTINUED | OUTPATIENT
Start: 2024-01-25 | End: 2024-01-25

## 2024-01-25 RX ORDER — METRONIDAZOLE 500 MG/100ML
500 INJECTION, SOLUTION INTRAVENOUS ONCE
Status: COMPLETED | OUTPATIENT
Start: 2024-01-25 | End: 2024-01-25

## 2024-01-25 RX ORDER — HEPARIN SODIUM 5000 [USP'U]/ML
5000 INJECTION, SOLUTION INTRAVENOUS; SUBCUTANEOUS ONCE
Status: COMPLETED | OUTPATIENT
Start: 2024-01-25 | End: 2024-01-25

## 2024-01-25 RX ORDER — NEOSTIGMINE METHYLSULFATE 1 MG/ML
INJECTION, SOLUTION INTRAVENOUS AS NEEDED
Status: DISCONTINUED | OUTPATIENT
Start: 2024-01-25 | End: 2024-01-25 | Stop reason: SURG

## 2024-01-25 RX ORDER — DEXAMETHASONE SODIUM PHOSPHATE 4 MG/ML
VIAL (ML) INJECTION AS NEEDED
Status: DISCONTINUED | OUTPATIENT
Start: 2024-01-25 | End: 2024-01-25 | Stop reason: SURG

## 2024-01-25 RX ORDER — HYDROMORPHONE HYDROCHLORIDE 1 MG/ML
0.4 INJECTION, SOLUTION INTRAMUSCULAR; INTRAVENOUS; SUBCUTANEOUS EVERY 5 MIN PRN
Status: DISCONTINUED | OUTPATIENT
Start: 2024-01-25 | End: 2024-01-25

## 2024-01-25 RX ORDER — NALOXONE HYDROCHLORIDE 0.4 MG/ML
0.08 INJECTION, SOLUTION INTRAMUSCULAR; INTRAVENOUS; SUBCUTANEOUS AS NEEDED
Status: DISCONTINUED | OUTPATIENT
Start: 2024-01-25 | End: 2024-01-25

## 2024-01-25 RX ORDER — HYDROMORPHONE HYDROCHLORIDE 1 MG/ML
0.6 INJECTION, SOLUTION INTRAMUSCULAR; INTRAVENOUS; SUBCUTANEOUS EVERY 5 MIN PRN
Status: DISCONTINUED | OUTPATIENT
Start: 2024-01-25 | End: 2024-01-25

## 2024-01-25 RX ORDER — HYDROCODONE BITARTRATE AND ACETAMINOPHEN 5; 325 MG/1; MG/1
1 TABLET ORAL ONCE AS NEEDED
Status: DISCONTINUED | OUTPATIENT
Start: 2024-01-25 | End: 2024-01-25

## 2024-01-25 RX ORDER — DEXTROSE MONOHYDRATE 25 G/50ML
50 INJECTION, SOLUTION INTRAVENOUS
Status: DISCONTINUED | OUTPATIENT
Start: 2024-01-25 | End: 2024-01-25

## 2024-01-25 RX ORDER — GLYCOPYRROLATE 0.2 MG/ML
INJECTION, SOLUTION INTRAMUSCULAR; INTRAVENOUS AS NEEDED
Status: DISCONTINUED | OUTPATIENT
Start: 2024-01-25 | End: 2024-01-25 | Stop reason: SURG

## 2024-01-25 RX ADMIN — SODIUM CHLORIDE, SODIUM LACTATE, POTASSIUM CHLORIDE, CALCIUM CHLORIDE: 600; 310; 30; 20 INJECTION, SOLUTION INTRAVENOUS at 16:35:00

## 2024-01-25 RX ADMIN — MIDAZOLAM HYDROCHLORIDE 2 MG: 1 INJECTION INTRAMUSCULAR; INTRAVENOUS at 15:55:00

## 2024-01-25 RX ADMIN — GLYCOPYRROLATE 0.6 MG: 0.2 INJECTION, SOLUTION INTRAMUSCULAR; INTRAVENOUS at 16:27:00

## 2024-01-25 RX ADMIN — SODIUM CHLORIDE, SODIUM LACTATE, POTASSIUM CHLORIDE, CALCIUM CHLORIDE: 600; 310; 30; 20 INJECTION, SOLUTION INTRAVENOUS at 15:55:00

## 2024-01-25 RX ADMIN — METOCLOPRAMIDE HYDROCHLORIDE 10 MG: 5 INJECTION INTRAMUSCULAR; INTRAVENOUS at 16:04:00

## 2024-01-25 RX ADMIN — NEOSTIGMINE METHYLSULFATE 3 MG: 1 INJECTION, SOLUTION INTRAVENOUS at 16:27:00

## 2024-01-25 RX ADMIN — ROCURONIUM BROMIDE 50 MG: 10 INJECTION, SOLUTION INTRAVENOUS at 16:00:00

## 2024-01-25 RX ADMIN — ONDANSETRON 4 MG: 2 INJECTION INTRAMUSCULAR; INTRAVENOUS at 16:04:00

## 2024-01-25 RX ADMIN — DEXAMETHASONE SODIUM PHOSPHATE 4 MG: 4 MG/ML VIAL (ML) INJECTION at 16:04:00

## 2024-01-25 RX ADMIN — METRONIDAZOLE 500 MG: 500 INJECTION, SOLUTION INTRAVENOUS at 16:03:00

## 2024-01-25 RX ADMIN — LIDOCAINE HYDROCHLORIDE 50 MG: 10 INJECTION, SOLUTION EPIDURAL; INFILTRATION; INTRACAUDAL; PERINEURAL at 15:55:00

## 2024-01-25 RX ADMIN — CEFAZOLIN SODIUM/WATER 2 G: 2 G/20 ML SYRINGE (ML) INTRAVENOUS at 16:03:00

## 2024-01-25 RX ADMIN — KETOROLAC TROMETHAMINE 30 MG: 30 INJECTION, SOLUTION INTRAMUSCULAR; INTRAVENOUS at 16:26:00

## 2024-01-25 NOTE — H&P
History & Physical Examination    Patient Name: Seven Lake  MRN: [id number]  [facility]: [id number]  YOB: 1960    Diagnosis: Perianal abscess    Present Illness: The patient is a 63-year-old gentleman who underwent incision and drainage of a large perianal abscess in July 2022.  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  The seton fell out prior to its removal and surgery.  His wound gradually healed by secondary intention.  He was last seen in July and found to be healed.     Last month, the patient had pain at the same spot.  Saturday morning, 2 days ago, he expressed blood and pus.  It continues to drain today, but the drainage is serosanguineous in nature.  The patient's pain has resolved.  He does continue, however, to have that serosanguineous drainage.  He denies fevers and chills.      Medications Prior to Admission   Medication Sig Dispense Refill Last Dose    traZODone 50 MG Oral Tab Take 0.5 tablets (25 mg total) by mouth nightly. 45 tablet 3 Past Week    PEG 3350-KCl-Na Bicarb-NaCl (TRILYTE) 420 g Oral Recon Soln Starting at 4:00 pm the night before procedure, drink 8 ounces of the prep every 15-20 minutes until finished 1 each 0     diclofenac 75 MG Oral Tab EC Take 1 tablet (75 mg total) by mouth as needed.   1/24/2024    Multiple Vitamins-Minerals (MULTIVITAL-M) Oral Tab Take by mouth.   Past Week    Ascorbic Acid (VITAMIN C) 1000 MG Oral Tab Take 1 tablet (1,000 mg total) by mouth daily.   Past Week    ustekinumab (STELARA) 90 MG/ML Subcutaneous Solution Prefilled Syringe injection Inject 1 syringe into the skin every 12 weeks 1 mL 1 More than a month     Current Facility-Administered Medications   Medication Dose Route Frequency    [MAR Hold] acetaminophen (Tylenol Extra Strength) tab 1,000 mg  1,000 mg Oral Once    [MAR Hold] scopolamine (Transderm-Scop) 1 MG/3DAYS patch 1  patch  1 patch Transdermal Once    lactated ringers infusion   Intravenous Continuous    ceFAZolin (Ancef) 2 g in 20mL IV syringe premix  2 g Intravenous Once    And    metRONIDAZOLE in sodium chloride 0.79% (Flagyl) 5 mg/mL IVPB premix 500 mg  500 mg Intravenous Once       Allergies: No Known Allergies    Past Medical History:   Diagnosis Date    Acute bronchitis 03/08/2011    Acute bronchospasm 03/08/2011    Anxiety state, unspecified     Chest pain, unspecified 03/17/2011    Essential hypertension, benign 04/04/2013    High blood pressure     Nonspecific abnormal electrocardiogram (ECG) (EKG) 03/17/2012    Other abnormal blood chemistry 01/31/2011    Sleep disturbance, unspecified 01/31/2011    Visual impairment     CONTACTS GLASSES     Past Surgical History:   Procedure Laterality Date    COLONOSCOPY N/A 12/14/2018    Procedure: COLONOSCOPY;  Surgeon: Brock Gillespie DO;  Location:  ENDOSCOPY    OTHER SURGICAL HISTORY      rectal 7/23 10/23     Family History   Problem Relation Age of Onset    Heart Disease Father     Cancer Other         Grandfather with Lung cancer    Stroke Other         Grandfather      Social History     Tobacco Use    Smoking status: Never    Smokeless tobacco: Never    Tobacco comments:     non-smoker   Substance Use Topics    Alcohol use: No     Alcohol/week: 0.0 standard drinks of alcohol       SYSTEM Check if Review is Normal Check if Physical Exam is Normal If not normal, please explain:   HEENT [x ] [x ]    NECK & BACK [x ] [x ]    HEART [x ] [x ]    LUNGS [x ] [x ]    ABDOMEN [ x] [ x]    UROGENITAL [ ] [ ] Perianal abscess   EXTREMITIES [ x] [x ]    OTHER        [ x ] I have discussed the risks and benefits and alternatives with the patient/family.  They understand and agree to proceed with plan of care.  [ x ] I have reviewed the History and Physical done within the last 30 days.  Any changes noted above.    Dianne Suarez MD  1/25/2024  3:50 PM

## 2024-01-25 NOTE — OPERATIVE REPORT
[facility]  Op Note    Seven Lake Location: OR   [facility] [id number] MRN [id number]   Admission Date 1/25/2024 Operation Date 1/25/2024   Attending Physician Dianne Suarez MD Operating Physician Dianne Suarez MD   DATE OF OPERATION:  1/25/2024  PREOPERATIVE DIAGNOSIS: Recurrent perianal abscess.   POSTOPERATIVE DIAGNOSIS: Recurrent perianal abscess.   PROCEDURE PERFORMED: Anal exam under anesthesia, incision and drainage of perirectal abscess.   SURGEON:  Dianne Suarez M.D.  ANESTHESIA: General.   SPECIMEN: None  BLOOD LOSS: 5 mL.   COMPLICATIONS: None.   INDICATIONS: The patient is a 63-year-old gentleman who underwent complex anal fistulotomy with placement of seton in October 2022.  His wound gradually healed.  Recently, the patient developed drainage from his scar site.  Physical exam revealed a deep cavity beneath the pinhole opening.  He was offered incision and drainage of this recurrent abscess.  The risks, benefits, and alternatives were discussed in detail with the patient. The risks included but were not limited to bleeding, wound infection, and recurrence of a fistula. The patient was agreeable to proceed with the operation.   PROCEDURE: After informed consent was obtained, patient taken to the operating room. General anesthesia was induced. The patient was then placed in the prone jackknife position. The buttocks were taped apart, and the perianal area was prepped and draped in the usual sterile fashion. External exam revealed the point of drainage along his scar at the 11 o'clock position. Digital rectal exam demonstrated some stenosis of the anal canal.  The bivalve anal retractor was passed through the patient's anus, and the anal canal was inspected. This was found to be normal except for the known internal hemorrhoids.   Using a 14-gauge Angiocath, hydrogen peroxide was instilled into the point of drainage along the scar.  Inspection of the anal canal revealed no  communication with the rectum.  The opening was then probed and found to track about 3 cm deep.  The skin incision was opened using cautery along the probe.  Manual probing then revealed a cavity lateral to the original point of probing.  The skin overlying this area was excised using cautery.  The wound was then irrigated with normal saline.  Cautery was used to obtain hemostasis.  The wound was packed with a dilute Betadine moistened gauze.  The wound was infiltrated with local anesthetic.  Sterile dressings were then applied.  The patient was returned to supine position. The patient was extubated in the OR.  He tolerated the procedure well and was transferred to the PACU in good condition.  FINDINGS: Abscess cavity measuring 3.5 cm x 2 cm x 3 cm  Dianne Suarez MD

## 2024-01-25 NOTE — ANESTHESIA PROCEDURE NOTES
Airway  Date/Time: 1/25/2024 4:00 PM  Urgency: elective      General Information and Staff    Patient location during procedure: OR  Anesthesiologist: Satinder Singh MD  Performed: anesthesiologist   Performed by: Satinder Singh MD  Authorized by: Satinder Singh MD      Indications and Patient Condition  Indications for airway management: anesthesia  Spontaneous Ventilation: absent  Sedation level: deep  Preoxygenated: yes  Patient position: sniffing  Mask difficulty assessment: 1 - vent by mask    Final Airway Details  Final airway type: endotracheal airway      Successful airway: ETT  Cuffed: yes   Successful intubation technique: Video laryngoscopy  Endotracheal tube insertion site: oral  Blade: GlideScope  Blade size: #3  ETT size (mm): 7.5    Cormack-Lehane Classification: grade I - full view of glottis  Placement verified by: capnometry   Cuff volume (mL): 5  Measured from: lips  ETT to lips (cm): 22  Number of attempts at approach: 1    Additional Comments  VC clean, passed smoothly, atraumatically. OGT and soft bite block to molars. Dentition unchanged

## 2024-01-25 NOTE — ANESTHESIA POSTPROCEDURE EVALUATION
[facility]    Seven Lake Patient Status:  Hospital Outpatient Surgery   Age/Gender 63 year old male MRN [id number]   Location [facility] POST ANESTHESIA CARE UNIT Attending Dianne Suarez MD   Hosp Day # 0 PCP Loreto Leon MD       Anesthesia Post-op Note    ANAL EXAMINATION UNDER ANESTHESIA, DRAINAGE OF PERIANAL ABSCESS    Procedure Summary       Date: 01/25/24 Room / Location:  MAIN OR 15 / [facility] MAIN OR    Anesthesia Start: 1555 Anesthesia Stop: 1644    Procedure: ANAL EXAMINATION UNDER ANESTHESIA, DRAINAGE OF PERIANAL ABSCESS (Anus) Diagnosis:       Perianal abscess      (Perianal abscess [K61.0])    Surgeons: Dianne Suarez MD Anesthesiologist: Satinder Singh MD    Anesthesia Type: general ASA Status: 2            Anesthesia Type: general    Vitals Value Taken Time   /72 01/25/24 1644   Temp 98 °F (36.7 °C) 01/25/24 1644   Pulse 91 01/25/24 1644   Resp 17 01/25/24 1644   SpO2 98 % 01/25/24 1644   Vitals shown include unfiled device data.    Patient Location: PACU    Anesthesia Type: general    Airway Patency: patent    Postop Pain Control: adequate    Mental Status: mildly sedated but able to meaningfully participate in the post-anesthesia evaluation    Nausea/Vomiting: none    Cardiopulmonary/Hydration status: stable euvolemic    Complications: no apparent anesthesia related complications    Postop vital signs: stable    Dental Exam: Unchanged from Preop    Patient to be discharged from PACU when criteria met.

## 2024-01-25 NOTE — DISCHARGE INSTRUCTIONS
Home Care Instructions  Incision and Drainage Perianal Abscess  Dr. Dianne Suarez    MEDICATIONS  For post-operative pain control the medications are usually Norco or Tylenol #3.  These are narcotics and are best taken by starting with one tablet and repeating every four to six hours as needed.  If the patient does not feel they need the narcotics they shouldn’t take them.  If the pain is severe the patient may take up to two pills every four hours.  If a minor supplement is necessary in addition to the narcotics do not overlap with Tylenol (any product with acetaminophen) as both Norco and Tylenol #3 contain Tylenol.  Please supplement with ibuprofen. Please ask Dr. Suarez before resuming blood thinners such as aspirin, Plavix or Coumadin.  All other home medications may be resumed as scheduled.  It is advisable on the day of surgery to take two tablespoons of Milk of Magnesia twice on this day only.  Repeat only if passing hard stool or if there is no bowel movement within 36 hours of surgery.    DIET  The patient must resume a high fiber diet immediately.  This is not a good time to get constipated.  If the patient were to pass a hard stool the operative site could be damaged.  See my high fiber diet instruction sheet.  Also, be sure to take Metamucil, Fibercon, Citrucel, Konsyl, Benefiber or another bulk laxative using the higher recommended daily dose.  There should be no alcohol consumption in the immediate recovery time period or within six hours of taking narcotics.    WOUND CARE  Any dressings should be removed the day after surgery.  This includes the gauze, tape, and band-aids if they are present.  The patient may shower the day after surgery.  All top gauze type dressings and packing should be removed prior to showering.  The wounds can be washed with soap and water and should be thoroughly cleaned in this manner at least once a day.  No hair dye or chemicals of any kind should get in the wounds.  In  addition to showering, starting the day after surgery the patient can start tub baths with hot water.  Hot water, as hot as can be withstood without burning the skin, can be very soothing.  Again, start this on the day after surgery.  Also, the day after surgery, the patient must start with Sitz baths three times a day.  A Sitz bath is a contraption bought at the pharmacy that enables a patient to get water on the operative site while sitting on a toilet.  There is nothing magical about this device and squirt bottles, tub baths and showers can be substituted for a Sitz bath device.  The minimum daily use is three times a day.  It is recommended to get water on the operative site upon awakening, after bowel movements and prior to bed.  Dry thoroughly with a clean hand towel or dry wash rag.    Before your first shower, remove any dressings and packing.  Shower like usual.  You do not need to cover the wound.  Tuck gauze into the wound.  It does not need to be packed tightly.  Dressings can then be placed over the packing to absorb drainage.  Change the dressing twice daily.    ACTIVITY  Every day the patient should be up, showered and dressed.  Each day the patient should be up and around the house.  The patient should not lie in bed and should not stay in pajamas.  We count on the patient being up, coughing, walking and deep breathing to avoid pneumonia and blood clots in the legs.  Once a day the patient should get out of the house and go shopping, go to the mall, the [employer] store, the [employer] or a restaurant.  The patient may ride in a car but should not drive the car for at least 48 hours.  Patients should be off narcotics for at least 8 hours prior to being the .  The average time off work is three to five days, but up to 10 days is sometimes necessary for jobs that include significant physical exertion.  Patients may go up and down stairs and lift up to ten pounds but no bending, pushing or pulling.   Nothing called work or exercise until the follow up visit.  Patients should seek further activity limits at the time of their appointment.    APPOINTMENT  Please call our office for an appointment within three weeks after surgery or as directed by Dr. Suarez. The reason to wait three weeks is because the area is checked by digital rectal exam.  It is at the three week norman that most patients can withstand the post-operative examination.  Any fever greater than 100.5, chills, nausea, vomiting, or severe diarrhea please call our office at [phone].  If the patient is unable to urinate and has a full and painful bladder call us immediately.  For life threatening emergencies call 911.  For non-emergent care please call our office after 8:30 a.m. Monday through Friday.  The number listed above is our office number; our phone automatically switches to and answering service if we are not there.  Please do not use the emergency room for non-urgent care.    SPECIAL NOTE  During surgery a long acting local anesthesia is placed to provide postoperative relief.  It wears off within 12 hours of surgery.  Patients can expect increasing pain at this point after surgery.  Please take one or two pain pills as the pain is starting.  Thank you for entrusting us with your care.  [facility]--General Surgery     You received a drug called Toradol which is an Anti Inflammatory at: 4:30pm  If you are allowed to take Anti inflammatories:    Do not take any Anti Inflammatory like Motrin, Aleve or Ibuprofen until after: 10:30pm  Please report any suspected allergic reactions or bleeding issues to your doctor

## 2024-02-09 ENCOUNTER — OFFICE VISIT (OUTPATIENT)
Facility: LOCATION | Age: 64
End: 2024-02-09
Payer: COMMERCIAL

## 2024-02-09 VITALS — HEART RATE: 67 BPM | TEMPERATURE: 98 F

## 2024-02-09 DIAGNOSIS — Z98.890 POST-OPERATIVE STATE: Primary | ICD-10-CM

## 2024-02-09 PROCEDURE — 99024 POSTOP FOLLOW-UP VISIT: CPT | Performed by: PHYSICIAN ASSISTANT

## 2024-02-09 NOTE — PROGRESS NOTES
Post Operative Visit Note       Active Problems  1. Post-operative state         Chief Complaint   Chief Complaint   Patient presents with    Post-Op     PO - 1/25 w/ [facility] - ANAL EXAMINATION UNDER ANESTHESIA, DRAINAGE OF PERIANAL ABSCESS - Pt states drainage is decreasing daily, Pt states he c/o to drain a light pick fluid as well as a brown fluid at tiems             History of Present Illness   63 year old male who presents today for postoperative visit following anal exam under anesthesia and drainage of perianal abscess on 1/25/2024 by Dr. Suarez.  The patient states that his wound is overall healing very well.  He states that he is having minimal manage from his wound.  He reports that pain has improved since surgery.  He denies nausea or vomiting.  He denies fever or chills.  Denies diarrhea or constipation.      Allergies  Seven has No Known Allergies.    Past Medical / Surgical / Social / Family History    The past medical and past surgical history have been reviewed by me today.     Past Medical History:   Diagnosis Date    Acute bronchitis 03/08/2011    Acute bronchospasm 03/08/2011    Anxiety state, unspecified     Chest pain, unspecified 03/17/2011    Essential hypertension, benign 04/04/2013    High blood pressure     Nonspecific abnormal electrocardiogram (ECG) (EKG) 03/17/2012    Other abnormal blood chemistry 01/31/2011    Sleep disturbance, unspecified 01/31/2011    Visual impairment     CONTACTS GLASSES     Past Surgical History:   Procedure Laterality Date    COLONOSCOPY N/A 12/14/2018    Procedure: COLONOSCOPY;  Surgeon: Brock Gillespie DO;  Location:  ENDOSCOPY    OTHER SURGICAL HISTORY      rectal 7/23 10/23       The family history and social history have been reviewed by me today.    Family History   Problem Relation Age of Onset    Heart Disease Father     Cancer Other         Grandfather with Lung cancer    Stroke Other         Grandfather      Social History     Socioeconomic  History    Marital status:    Tobacco Use    Smoking status: Never    Smokeless tobacco: Never    Tobacco comments:     non-smoker   Vaping Use    Vaping Use: Never used   Substance and Sexual Activity    Alcohol use: No     Alcohol/week: 0.0 standard drinks of alcohol    Drug use: No        Current Outpatient Medications:     HYDROcodone-acetaminophen (NORCO) 5-325 MG Oral Tab, Take 1 tablet by mouth every 6 (six) hours as needed for Pain., Disp: 20 tablet, Rfl: 0    traZODone 50 MG Oral Tab, Take 0.5 tablets (25 mg total) by mouth nightly., Disp: 45 tablet, Rfl: 3    PEG 3350-KCl-Na Bicarb-NaCl (TRILYTE) 420 g Oral Recon Soln, Starting at 4:00 pm the night before procedure, drink 8 ounces of the prep every 15-20 minutes until finished, Disp: 1 each, Rfl: 0    diclofenac 75 MG Oral Tab EC, Take 1 tablet (75 mg total) by mouth as needed., Disp: , Rfl:     ustekinumab (STELARA) 90 MG/ML Subcutaneous Solution Prefilled Syringe injection, Inject 1 syringe into the skin every 12 weeks, Disp: 1 mL, Rfl: 1    Multiple Vitamins-Minerals (MULTIVITAL-M) Oral Tab, Take by mouth., Disp: , Rfl:     Ascorbic Acid (VITAMIN C) 1000 MG Oral Tab, Take 1 tablet (1,000 mg total) by mouth daily., Disp: , Rfl:       Review of Systems  A 10 point Review of Systems has been completed by me today and is negative except as above in the HPI.    Physical Findings   Pulse 67   Temp 98.1 °F (36.7 °C)   Gen/psych: alert and oriented, cooperative, no apparent distress  Cardiovascular: regular rate  Respiratory: respirations unlabored, no wheeze  Abdominal: soft, non-tender, non-distended, no guarding/rebound  Anal area was examined and wound to the left lateral buttock is healing well.  Pink healthy granulation tissue noted.  There is no surrounding erythema or or induration.  There is minimal serous drainage.       Assessment/Plan  1. Post-operative state      I discussed with the patient that his wound to the left lateral buttock is  overall healing very well.    Continue to shower daily and apply dry dressing to cover.  He should continue with high-fiber diet and stool softeners daily as needed to avoid constipation.  All questions and concerns were answered.  He is to return to clinic in 2 weeks for wound check, sooner if needed.     No orders of the defined types were placed in this encounter.       Imaging & Referrals   None      Amelie Callahan PA-C  [facility] [place] General Surgery  2/9/2024  3:59 PM

## 2024-02-27 ENCOUNTER — OFFICE VISIT (OUTPATIENT)
Facility: LOCATION | Age: 64
End: 2024-02-27
Payer: COMMERCIAL

## 2024-02-27 DIAGNOSIS — T81.49XA POST-OPERATIVE WOUND ABSCESS: Primary | ICD-10-CM

## 2024-02-27 PROCEDURE — 99024 POSTOP FOLLOW-UP VISIT: CPT

## 2024-02-27 NOTE — PROGRESS NOTES
Post Operative Visit Note       Active Problems  1. post op wound check         Chief Complaint   Chief Complaint   Patient presents with    Post-Op     PO - 2 week follow up, 1/25 w/ [facility] - ANAL EXAMINATION UNDER ANESTHESIA, DRAINAGE   OF PERIANAL ABSCESS - Pt c/o cont drainage             History of Present Illness   64 year old male 1 month postop from anal exam under anesthesia and drainage of perianal abscess presents for postop follow up.    The patient states he is doing remarkably well postoperatively.  He states his drainage is significantly decreasing.  He denies pain or discomfort.  He denies induration or concern for ongoing infection.  He is tolerating oral intake and having normal bowel function.  He denies constipation or diarrhea.  He denies fevers or chills.    He has no concerns at this time.      Allergies  Seven has No Known Allergies.    Past Medical / Surgical / Social / Family History    The past medical and past surgical history have been reviewed by me today.     Past Medical History:   Diagnosis Date    Acute bronchitis 03/08/2011    Acute bronchospasm 03/08/2011    Anxiety state, unspecified     Chest pain, unspecified 03/17/2011    Essential hypertension, benign 04/04/2013    High blood pressure     Nonspecific abnormal electrocardiogram (ECG) (EKG) 03/17/2012    Other abnormal blood chemistry 01/31/2011    Sleep disturbance, unspecified 01/31/2011    Visual impairment     CONTACTS GLASSES     Past Surgical History:   Procedure Laterality Date    COLONOSCOPY N/A 12/14/2018    Procedure: COLONOSCOPY;  Surgeon: Brock Gillespie DO;  Location:  ENDOSCOPY    OTHER SURGICAL HISTORY      rectal 7/23 10/23       The family history and social history have been reviewed by me today.    Family History   Problem Relation Age of Onset    Heart Disease Father     Cancer Other         Grandfather with Lung cancer    Stroke Other         Grandfather      Social History     Socioeconomic History     Marital status:    Tobacco Use    Smoking status: Never    Smokeless tobacco: Never    Tobacco comments:     non-smoker   Vaping Use    Vaping Use: Never used   Substance and Sexual Activity    Alcohol use: No     Alcohol/week: 0.0 standard drinks of alcohol    Drug use: No        Current Outpatient Medications:     HYDROcodone-acetaminophen (NORCO) 5-325 MG Oral Tab, Take 1 tablet by mouth every 6 (six) hours as needed for Pain., Disp: 20 tablet, Rfl: 0    traZODone 50 MG Oral Tab, Take 0.5 tablets (25 mg total) by mouth nightly., Disp: 45 tablet, Rfl: 3    PEG 3350-KCl-Na Bicarb-NaCl (TRILYTE) 420 g Oral Recon Soln, Starting at 4:00 pm the night before procedure, drink 8 ounces of the prep every 15-20 minutes until finished, Disp: 1 each, Rfl: 0    diclofenac 75 MG Oral Tab EC, Take 1 tablet (75 mg total) by mouth as needed., Disp: , Rfl:     ustekinumab (STELARA) 90 MG/ML Subcutaneous Solution Prefilled Syringe injection, Inject 1 syringe into the skin every 12 weeks, Disp: 1 mL, Rfl: 1    Multiple Vitamins-Minerals (MULTIVITAL-M) Oral Tab, Take by mouth., Disp: , Rfl:     Ascorbic Acid (VITAMIN C) 1000 MG Oral Tab, Take 1 tablet (1,000 mg total) by mouth daily., Disp: , Rfl:       Review of Systems  A 10 point Review of Systems has been completed by me today and is negative except as above in the HPI.    Physical Findings   There were no vitals taken for this visit.  Gen/psych: alert and oriented, cooperative, no apparent distress  Cardiovascular: regular rate  Respiratory: respirations unlabored, no wheeze  Abdominal: soft, non-tender, non-distended, no guarding/rebound  : Rectal wound is healing appropriately.  There is a 5 mm wound with a 2 mm deep opening with healthy granulation tissue.  There is no purulent drainage or surrounding erythema.  No induration.  The wound is healing appropriately.         Assessment/Plan  1. post op wound check        64 year old male 1 month post op from drainage of  perianal abscess    Overall the patient is doing well today.  Continue to shower and cleanse wound with soap and water daily.    Continue p.o. Tylenol or Motrin for pain as needed    The patient may apply soap and water directly to the incisions.     Discussed with the patient that his wound is healing appropriately.  He does not need to follow-up unless he continues to notice drainage in the next 2 to 3 weeks as he may require silver nitrate application to the wound.    If there are any questions or concerns, please call the office or make an appointment as needed.    The patient understands and agrees to the current plan. All questions and concerns were addressed during today's encounter.           No orders of the defined types were placed in this encounter.       Imaging & Referrals   None    Follow Up  Return if symptoms worsen or fail to improve.    Raven Martinez PA-C  [facility]  2/27/2024  5:15 PM

## 2024-04-16 ENCOUNTER — OFFICE VISIT (OUTPATIENT)
Facility: LOCATION | Age: 64
End: 2024-04-16
Payer: COMMERCIAL

## 2024-04-16 VITALS — TEMPERATURE: 98 F | HEART RATE: 90 BPM

## 2024-04-16 DIAGNOSIS — K61.0 PERIANAL ABSCESS: Primary | ICD-10-CM

## 2024-04-16 PROCEDURE — 99024 POSTOP FOLLOW-UP VISIT: CPT

## 2024-04-16 NOTE — PROGRESS NOTES
Follow Up Visit Note       Active Problems      1. Perianal abscess          Chief Complaint   Chief Complaint   Patient presents with    Post-Op     PO 1/25 ANAL EXAMINATION UNDER ANESTHESIA, DRAINAGE OF PERIANAL ABSCESS W/PATRICK- pt reports discharge and bleeding          History of Present Illness    Seven Lake is a 84-year-old male who presents to clinic for continued care and evaluation following anal exam under anesthesia, drainage of perirectal abscess on 1/25/24 with Dr. Suarez.     He saw Raven Martinez PA-C on 2/27/24 for 1 month postop visit. At that time, he continued to notice drainage from his incision site, but did not need further follow up.     Seven presents today for follow up. He states he has noticed a significant improvement in the drainage, but it is not completely resolved.  He reports bloody drainage and continues to apply a dry dressing to the area 3 times a day.  He continues to wash the area with soap and water daily. He denies fevers or chills. He reports regular bowel function and take a fiber supplement daily.    Allergies  Seven has No Known Allergies.    Past Medical / Surgical / Social / Family History    The past medical and past surgical history have been reviewed by me today.    Past Medical History:    Acute bronchitis    Acute bronchospasm    Anxiety state, unspecified    Chest pain, unspecified    Essential hypertension, benign    High blood pressure    Nonspecific abnormal electrocardiogram (ECG) (EKG)    Other abnormal blood chemistry    Sleep disturbance, unspecified    Visual impairment    CONTACTS GLASSES     Past Surgical History:   Procedure Laterality Date    Colonoscopy N/A 12/14/2018    Procedure: COLONOSCOPY;  Surgeon: Brock Gillespie DO;  Location:  ENDOSCOPY    Other surgical history      rectal 7/23 10/23       The family history and social history have been reviewed by me today.    Family History   Problem Relation Age of Onset    Heart  Disease Father     Cancer Other         Grandfather with Lung cancer    Stroke Other         Grandfather      Social History     Socioeconomic History    Marital status:    Tobacco Use    Smoking status: Never    Smokeless tobacco: Never    Tobacco comments:     non-smoker   Vaping Use    Vaping status: Never Used   Substance and Sexual Activity    Alcohol use: No     Alcohol/week: 0.0 standard drinks of alcohol    Drug use: No        Current Outpatient Medications:     HYDROcodone-acetaminophen (NORCO) 5-325 MG Oral Tab, Take 1 tablet by mouth every 6 (six) hours as needed for Pain., Disp: 20 tablet, Rfl: 0    traZODone 50 MG Oral Tab, Take 0.5 tablets (25 mg total) by mouth nightly., Disp: 45 tablet, Rfl: 3    PEG 3350-KCl-Na Bicarb-NaCl (TRILYTE) 420 g Oral Recon Soln, Starting at 4:00 pm the night before procedure, drink 8 ounces of the prep every 15-20 minutes until finished, Disp: 1 each, Rfl: 0    diclofenac 75 MG Oral Tab EC, Take 1 tablet (75 mg total) by mouth as needed., Disp: , Rfl:     ustekinumab (STELARA) 90 MG/ML Subcutaneous Solution Prefilled Syringe injection, Inject 1 syringe into the skin every 12 weeks, Disp: 1 mL, Rfl: 1    Multiple Vitamins-Minerals (MULTIVITAL-M) Oral Tab, Take by mouth., Disp: , Rfl:     Ascorbic Acid (VITAMIN C) 1000 MG Oral Tab, Take 1 tablet (1,000 mg total) by mouth daily., Disp: , Rfl:      Review of Systems  The Review of Systems has been reviewed by me during today.  Review of Systems   Constitutional:  Negative for chills, diaphoresis, fatigue, fever and unexpected weight change.   HENT:  Negative for hearing loss, nosebleeds, sore throat and trouble swallowing.    Respiratory:  Negative for apnea, cough, shortness of breath and wheezing.    Cardiovascular:  Negative for chest pain, palpitations and leg swelling.   Gastrointestinal:  Negative for abdominal distention, abdominal pain, anal bleeding, blood in stool, constipation, diarrhea, nausea and vomiting.    Genitourinary:  Negative for difficulty urinating, dysuria, frequency and urgency.   Musculoskeletal:  Negative for arthralgias and myalgias.   Skin:  Negative for color change and rash.   Neurological:  Negative for tremors, syncope and weakness.   Hematological:  Negative for adenopathy. Does not bruise/bleed easily.   Psychiatric/Behavioral:  Negative for behavioral problems and sleep disturbance.         Physical Findings   Pulse 90   Temp 97.8 °F (36.6 °C) (Temporal)   Physical Exam  Constitutional:       Appearance: Normal appearance.   HENT:      Head: Normocephalic and atraumatic.   Eyes:      Extraocular Movements: Extraocular movements intact.      Pupils: Pupils are equal, round, and reactive to light.   Cardiovascular:      Rate and Rhythm: Normal rate and regular rhythm.   Pulmonary:      Effort: Pulmonary effort is normal. No respiratory distress.   Abdominal:      General: Abdomen is flat. Bowel sounds are normal. There is no distension.      Palpations: Abdomen is soft. There is no mass.      Tenderness: There is no abdominal tenderness. There is no guarding or rebound.   Genitourinary:     Comments: The patient examined in prone jackknife position with Isabel Butler RN present.     There is a pin-point hole at the anal verge with no active drainage noted. Silver nitrate was applied to the area. The patient tolerated this well. Prior incision site is well healed.  Musculoskeletal:         General: Normal range of motion.      Cervical back: Normal range of motion.   Skin:     General: Skin is warm.      Coloration: Skin is not jaundiced or pale.      Findings: No erythema.   Neurological:      General: No focal deficit present.      Mental Status: He is alert and oriented to person, place, and time.          Assessment   1. Perianal abscess        Plan   Overall, the patient continues to do well postoperatively.   Continue to monitor for increased drainage.   He should continue to wash the area  with soap and water daily. He should apply a dry dressing as needed for comfort.  I recommend he establish care with Dr. Gillespie as Dr. Suarez is no longer with the practice. They can discuss next steps, if needed.  All of the patient's questions and concerns were addressed. He expressed his understanding and is in agreement with this plan.     No orders of the defined types were placed in this encounter.      Imaging & Referrals   None    Follow Up  No follow-ups on file.    GALDINO Borges

## 2024-04-23 ENCOUNTER — OFFICE VISIT (OUTPATIENT)
Facility: LOCATION | Age: 64
End: 2024-04-23
Payer: COMMERCIAL

## 2024-04-23 VITALS — TEMPERATURE: 98 F | HEART RATE: 75 BPM

## 2024-04-23 DIAGNOSIS — K60.3 ANAL FISTULA: Primary | ICD-10-CM

## 2024-04-23 PROCEDURE — 99214 OFFICE O/P EST MOD 30 MIN: CPT | Performed by: SURGERY

## 2024-04-23 NOTE — H&P
Seven Lake is a 64 year old male  Chief Complaint   Patient presents with    Post-Op     PO 1 week f/u  1/25/24 ANAL EXAMINATION UNDER ANESTHESIA, DRAINAGE OF PERIANAL ABSCESS W/PATRICK       REFERRED BY    Patient presents with anal fistula.  Patient underwent incision and drainage perirectal abscess by my partner Dr. Fox over 2 years ago.  Patient subsequently developed fistula for which he performed anal fistulotomy with seton placement.  The patient was essentially symptom-free for approximately 1 year at which point he redeveloped a perirectal abscess.  Dr. Suarez took the patient to the OR drain the abscess and attempted to find the internal fistulous opening without success.  Patient now presents with continued drainage from the perianal area.       .           Past Medical History:    Acute bronchitis    Acute bronchospasm    Anxiety state, unspecified    Chest pain, unspecified    Essential hypertension, benign    High blood pressure    Nonspecific abnormal electrocardiogram (ECG) (EKG)    Other abnormal blood chemistry    Sleep disturbance, unspecified    Visual impairment    CONTACTS GLASSES     Past Surgical History:   Procedure Laterality Date    Colonoscopy N/A 12/14/2018    Procedure: COLONOSCOPY;  Surgeon: Brock Gillespie DO;  Location:  ENDOSCOPY    Other surgical history      rectal 7/23 10/23     Current Outpatient Medications on File Prior to Visit   Medication Sig Dispense Refill    HYDROcodone-acetaminophen (NORCO) 5-325 MG Oral Tab Take 1 tablet by mouth every 6 (six) hours as needed for Pain. 20 tablet 0    traZODone 50 MG Oral Tab Take 0.5 tablets (25 mg total) by mouth nightly. 45 tablet 3    PEG 3350-KCl-Na Bicarb-NaCl (TRILYTE) 420 g Oral Recon Soln Starting at 4:00 pm the night before procedure, drink 8 ounces of the prep every 15-20 minutes until finished 1 each 0    diclofenac 75 MG Oral Tab EC Take 1 tablet (75 mg total) by mouth as needed.      ustekinumab  (STELARA) 90 MG/ML Subcutaneous Solution Prefilled Syringe injection Inject 1 syringe into the skin every 12 weeks 1 mL 1    Multiple Vitamins-Minerals (MULTIVITAL-M) Oral Tab Take by mouth.      Ascorbic Acid (VITAMIN C) 1000 MG Oral Tab Take 1 tablet (1,000 mg total) by mouth daily.       No current facility-administered medications on file prior to visit.     @ALL@  Family History   Problem Relation Age of Onset    Heart Disease Father     Cancer Other         Grandfather with Lung cancer    Stroke Other         Grandfather      Social History     Socioeconomic History    Marital status:    Tobacco Use    Smoking status: Never    Smokeless tobacco: Never    Tobacco comments:     non-smoker   Vaping Use    Vaping status: Never Used   Substance and Sexual Activity    Alcohol use: No     Alcohol/week: 0.0 standard drinks of alcohol    Drug use: No     Social Determinants of Health      [facility]    Housing Stability       ROS     GENERAL HEALTH: otherwise feels well, no weight loss, no fever or chills  SKIN: denies any unusual skin rashes or jaundice  HEENT: denies nasal congestion, sinus pain or sore throat; hearing loss negative,   EYES , no diplopia or vision changes  RESPIRATORY: denies shortness of breath, wheezing or cough   CARDIOVASCULAR: denies chest pain or WOOTEN; no palpitations   GI: denies nausea, vomiting, constipation, diarrhea; no rectal bleeding; no heartburn  GENITAL/: no dysuria, urgency or frequency, no tea colored urine  MUSCULOSKELETAL: no joint complaints upper or lower extremities  HEMATOLOGY: denies hx anemia; denies bruising or excessive bleeding  Endocrine: no weight gain or loss no hot or cold intolerance    EXAM     Pulse 75, temperature 97.5 °F (36.4 °C), temperature source Temporal.  GENERAL: well developed, well nourished male, in no apparent distress.    MENTAL STATUS :Alert, oriented x 3  PSYCH: normal mood  and affect  SKIN: anicteric, no rashes, no bruising  EYES: PERRLA, EOMI, sclera anicteric,  conjunctiva without pallor  HEENT: normocephalic, atraumatic, TMs clear, nares patent, mouth moist, pharynx w/o erythema  NECK: supple, no JVD, no adenopathy, no thyromegaly  RESPIRATORY: clear to auscultation, no rales , rhonchi or wheezing  CARDIOVASCULAR: RRR, murmur negative  ABDOMEN: normal active BS, soft, nondistended  no HSM, no masses or hernias  LYMPHATIC: no axillary , supraclavicular or inguinal lymphadenopathy  EXTREMITIES: no cyanosis, clubbing or edema, no atrophy, full ROM  Rectal examination performed with Rikki SCOTT demonstrates evidence of a posterolateral anal fistula chronic tract I attempted to probe the tract which appeared to extend to the dorsal midline.  STUDIES:     [facility] Lab Encounter on 10/17/2023   Component Date Value Ref Range Status    Prostate Specific Antigen Screen 10/17/2023 3.22  <=4.00 ng/mL Final    Comment: INTERPRETIVE INFORMATION: TOTAL PROSTATE SPECIFIC ANTIGEN:    The Siemens Total PSA(TPSA)chemiluminescent (LOCI) immunoassay was used.  Results obtained with different test methods or kits cannot be used interchangeably.  The Siemens TPSA is approved for use as an aid in the detection of prostate cancer when used in conjuction with a digital rectal exam in men age 50 or older.    The Siemens TPSA method is also indicated for use as an aid in the management monitoring of prostate cancer patients.  Elevated PSA concentrations can only suggest the presence of prostate cancer until biopsy is performed, which is required for diagnosis of cancer.  PSA concentrations can be elevated in the prostate.  PSA is generally not elevated in healthy men or men with non-prostatic carcinoma.        This test may exhibit interference when a sample is collected from a person who is consuming high dose of biotin (a.k.a., vitamin B7, vitamin H, coenzyme R) supplements resulting in serum concentrations >100                             ng/mL.  Intake of the recommended daily allowance (RDA) for biotin (0.03 mg) has not been shown to typically cause significant interference; however, high dose daily dietary supplements may contain biotin concentrations greater than 150 times (5-10 mg) the RDA.  It is recommended that physicians ask all patients who may be on biotin supplementation to stop biotin consumption at least 72 hours prior to collection of a new sample.      Cholesterol, Total 10/17/2023 172  <200 mg/dL Final    Desirable  <200 mg/dL  Borderline  200-239 mg/dL  High      >=240 mg/dL        HDL Cholesterol 10/17/2023 41  40 - 59 mg/dL Final    Interpretive Information:   An HDL cholesterol <40 mg/dL is low and constitutes a coronary heart disease risk factor. An HDL cholesterol >60 mg/dL is a negative risk factor for coronary heart disease.        Triglycerides 10/17/2023 80  30 - 149 mg/dL Final    Reference interval for fasting triglycerides  Desirable: <150 mg/dL  Borderline: 150-199 mg/dL  High: 200-499 mg/dL  Very High: >=500 mg/dL          LDL Cholesterol 10/17/2023 116 (H)  <100 mg/dL Final    Desirable <100 mg/dL   Borderline 100-129 mg/dL   High     >=130mg/dL        VLDL 10/17/2023 14  0 - 30 mg/dL Final    Non HDL Chol 10/17/2023 131 (H)  <130 mg/dL Final    Desirable  <130 mg/dL   Borderline  130-159 mg/dL   High        160-189 mg/dL       Very high >=190 mg/dL        Patient Fasting for Lipid? 10/17/2023 Yes   Final    TSH 10/17/2023 1.650  0.358 - 3.740 mIU/mL Final    This test may exhibit interference when a sample is collected from a person who is consuming high dose of biotin (a.k.a., vitamin B7, vitamin H, coenzyme R) supplements resulting in serum concentrations >100 ng/mL.  Intake of the recommended daily allowance (RDA) for biotin (0.03 mg) has not been shown to typically cause significant interference; however, high dose daily dietary supplements may contain biotin concentrations greater than  150 times (5-10 mg) the RDA.  It is recommended that physicians ask all patients who may be on biotin supplementation to stop biotin consumption at least 72 hours prior to collection of a new sample.      Glucose 10/17/2023 76  70 - 99 mg/dL Final    Sodium 10/17/2023 138  136 - 145 mmol/L Final    Potassium 10/17/2023 4.5  3.5 - 5.1 mmol/L Final    Chloride 10/17/2023 104  98 - 112 mmol/L Final    CO2 10/17/2023 29.0  21.0 - 32.0 mmol/L Final    Anion Gap 10/17/2023 5  0 - 18 mmol/L Final    BUN 10/17/2023 19 (H)  7 - 18 mg/dL Final    Creatinine 10/17/2023 1.25  0.70 - 1.30 mg/dL Final    Calcium, Total 10/17/2023 8.8  8.5 - 10.1 mg/dL Final    Calculated Osmolality 10/17/2023 287  275 - 295 mOsm/kg Final    eGFR-Cr 10/17/2023 66  >=60 mL/min/1.73m2 Final    AST 10/17/2023 20  15 - 37 U/L Final    ALT 10/17/2023 31  16 - 61 U/L Final    Alkaline Phosphatase 10/17/2023 63  45 - 117 U/L Final    Bilirubin, Total 10/17/2023 2.3 (H)  0.1 - 2.0 mg/dL Final    Total Protein 10/17/2023 7.2  6.4 - 8.2 g/dL Final    Albumin 10/17/2023 4.0  3.4 - 5.0 g/dL Final    Globulin  10/17/2023 3.2  2.8 - 4.4 g/dL Final    A/G Ratio 10/17/2023 1.3  1.0 - 2.0 Final    Patient Fasting for CMP? 10/17/2023 Yes   Final    WBC 10/17/2023 6.6  4.0 - 11.0 x10(3) uL Final    RBC 10/17/2023 4.68  4.30 - 5.70 x10(6)uL Final    HGB 10/17/2023 13.9  13.0 - 17.5 g/dL Final    HCT 10/17/2023 40.7  39.0 - 53.0 % Final    PLT 10/17/2023 333.0  150.0 - 450.0 10(3)uL Final    MCV 10/17/2023 87.0  80.0 - 100.0 fL Final    MCH 10/17/2023 29.7  26.0 - 34.0 pg Final    MCHC 10/17/2023 34.2  31.0 - 37.0 g/dL Final    RDW 10/17/2023 12.2  % Final       ASSESSMENT   Imp: Recurrent perianal fistula  PLan: 2 OR for anal exam under anesthesia with fistulotomy and seton placement discussed with patient risk of bleeding infection and recurrence all questions answered.  Advised him that if it did recur I would refer him to my partner Dr. Vang for endorectal  flap.      Meds & Refills for this Visit:  Requested Prescriptions      No prescriptions requested or ordered in this encounter       Imaging & Consults:  None

## 2024-04-24 ENCOUNTER — TELEPHONE (OUTPATIENT)
Facility: LOCATION | Age: 64
End: 2024-04-24

## 2024-04-24 NOTE — TELEPHONE ENCOUNTER
LUIS ALBERTO JONES Patient  Member ID  [id number]    Date of Birth  1960-02-23    Gender  Male    Transaction Type  Outpatient Authorization    Organization  [facility]    Payer  [facility] logo     Certificate Information  Reference Number  [id number]    Status  NO ACTION REQUIRED    Message  Requested Service does not require preauthorization. We would strongly encourage you to check benefits for this service.    Member Information  Patient Name  LUIS ALBERTO JONES    Patient Date of Birth  1960-02-23    Patient Gender  Male    Member ID  [id number]    Relationship to Subscriber  Self    Subscriber Name  LUIS ALBERTO JONES    Requesting Provider     Name  SEMAJ SALCEDO    NPI  [id number]    Tax Id  [id number]    Specialty  [id number]  Provider Role  Provider    Address  [address] [place] [address]    Phone  [phone]  Fax  [phone]    Contact Name  DEVANG CORADO    Service Information  Service Type  2 - Surgical    Place of Service  22 - [facility]    Service From - To Date  2024-05-06 - 2024-07-08    Level of Service  Elective    Diagnosis Code 1  K603 - Anal fistula    Procedure Code 1 (CPT/HCPCS)  77117 - SURG DX EXAM ANORECTAL    Quantity  1 Units    Status  NO ACTION REQUIRED    Procedure Code 2 (CPT/HCPCS)  41181 - REMOVE ANAL FIST INTER    Quantity  1 Units    Status  NO ACTION REQUIRED    Rendering Provider/Facility     Provider 1  Name  DENISSE SEMAJ    NPI  [id number]    Specialty  [id number]  Provider Role  Attending    Address  [address] [place] [address]    Provider 2  Name  [facility]    NPI  [id number]    Provider Role  Facility    Address  [address] [place] [address]      [phone]

## 2024-05-06 ENCOUNTER — ANESTHESIA EVENT (OUTPATIENT)
Dept: SURGERY | Facility: HOSPITAL | Age: 64
End: 2024-05-06
Payer: COMMERCIAL

## 2024-05-06 ENCOUNTER — ANESTHESIA (OUTPATIENT)
Dept: SURGERY | Facility: HOSPITAL | Age: 64
End: 2024-05-06
Payer: COMMERCIAL

## 2024-05-06 ENCOUNTER — HOSPITAL ENCOUNTER (OUTPATIENT)
Facility: HOSPITAL | Age: 64
Setting detail: HOSPITAL OUTPATIENT SURGERY
Discharge: HOME | End: 2024-05-06
Attending: SURGERY | Admitting: SURGERY
Payer: COMMERCIAL

## 2024-05-06 VITALS
RESPIRATION RATE: 16 BRPM | TEMPERATURE: 98 F | BODY MASS INDEX: 28.22 KG/M2 | HEART RATE: 73 BPM | OXYGEN SATURATION: 98 % | WEIGHT: 208.31 LBS | DIASTOLIC BLOOD PRESSURE: 82 MMHG | SYSTOLIC BLOOD PRESSURE: 124 MMHG | HEIGHT: 72 IN

## 2024-05-06 DIAGNOSIS — K60.3 ANAL FISTULA: Primary | ICD-10-CM

## 2024-05-06 PROCEDURE — 0H89XZZ DIVISION OF PERINEUM SKIN, EXTERNAL APPROACH: ICD-10-PCS | Performed by: SURGERY

## 2024-05-06 RX ORDER — ONDANSETRON 2 MG/ML
INJECTION INTRAMUSCULAR; INTRAVENOUS AS NEEDED
Status: DISCONTINUED | OUTPATIENT
Start: 2024-05-06 | End: 2024-05-06 | Stop reason: SURG

## 2024-05-06 RX ORDER — LIDOCAINE HYDROCHLORIDE 10 MG/ML
INJECTION, SOLUTION EPIDURAL; INFILTRATION; INTRACAUDAL; PERINEURAL AS NEEDED
Status: DISCONTINUED | OUTPATIENT
Start: 2024-05-06 | End: 2024-05-06 | Stop reason: SURG

## 2024-05-06 RX ORDER — HEPARIN SODIUM 5000 [USP'U]/ML
5000 INJECTION, SOLUTION INTRAVENOUS; SUBCUTANEOUS ONCE
Status: COMPLETED | OUTPATIENT
Start: 2024-05-06 | End: 2024-05-06

## 2024-05-06 RX ORDER — NALOXONE HYDROCHLORIDE 0.4 MG/ML
0.08 INJECTION, SOLUTION INTRAMUSCULAR; INTRAVENOUS; SUBCUTANEOUS AS NEEDED
Status: DISCONTINUED | OUTPATIENT
Start: 2024-05-06 | End: 2024-05-06

## 2024-05-06 RX ORDER — DIBUCAINE 0.28 G/28G
OINTMENT TOPICAL AS NEEDED
Status: DISCONTINUED | OUTPATIENT
Start: 2024-05-06 | End: 2024-05-06 | Stop reason: HOSPADM

## 2024-05-06 RX ORDER — NICOTINE POLACRILEX 4 MG
15 LOZENGE BUCCAL
Status: DISCONTINUED | OUTPATIENT
Start: 2024-05-06 | End: 2024-05-06

## 2024-05-06 RX ORDER — LIDOCAINE HYDROCHLORIDE 10 MG/ML
INJECTION, SOLUTION INFILTRATION; PERINEURAL AS NEEDED
Status: DISCONTINUED | OUTPATIENT
Start: 2024-05-06 | End: 2024-05-06 | Stop reason: HOSPADM

## 2024-05-06 RX ORDER — LABETALOL HYDROCHLORIDE 5 MG/ML
5 INJECTION, SOLUTION INTRAVENOUS EVERY 5 MIN PRN
Status: DISCONTINUED | OUTPATIENT
Start: 2024-05-06 | End: 2024-05-06

## 2024-05-06 RX ORDER — SODIUM CHLORIDE, SODIUM LACTATE, POTASSIUM CHLORIDE, CALCIUM CHLORIDE 600; 310; 30; 20 MG/100ML; MG/100ML; MG/100ML; MG/100ML
INJECTION, SOLUTION INTRAVENOUS CONTINUOUS
Status: DISCONTINUED | OUTPATIENT
Start: 2024-05-06 | End: 2024-05-06

## 2024-05-06 RX ORDER — HYDROMORPHONE HYDROCHLORIDE 1 MG/ML
0.4 INJECTION, SOLUTION INTRAMUSCULAR; INTRAVENOUS; SUBCUTANEOUS EVERY 5 MIN PRN
Status: DISCONTINUED | OUTPATIENT
Start: 2024-05-06 | End: 2024-05-06

## 2024-05-06 RX ORDER — MEPERIDINE HYDROCHLORIDE 25 MG/ML
12.5 INJECTION INTRAMUSCULAR; INTRAVENOUS; SUBCUTANEOUS AS NEEDED
Status: DISCONTINUED | OUTPATIENT
Start: 2024-05-06 | End: 2024-05-06

## 2024-05-06 RX ORDER — MIDAZOLAM HYDROCHLORIDE 1 MG/ML
INJECTION INTRAMUSCULAR; INTRAVENOUS AS NEEDED
Status: DISCONTINUED | OUTPATIENT
Start: 2024-05-06 | End: 2024-05-06 | Stop reason: SURG

## 2024-05-06 RX ORDER — PROCHLORPERAZINE EDISYLATE 5 MG/ML
5 INJECTION INTRAMUSCULAR; INTRAVENOUS EVERY 8 HOURS PRN
Status: DISCONTINUED | OUTPATIENT
Start: 2024-05-06 | End: 2024-05-06

## 2024-05-06 RX ORDER — ONDANSETRON 2 MG/ML
4 INJECTION INTRAMUSCULAR; INTRAVENOUS EVERY 6 HOURS PRN
Status: DISCONTINUED | OUTPATIENT
Start: 2024-05-06 | End: 2024-05-06

## 2024-05-06 RX ORDER — CEFAZOLIN SODIUM/WATER 2 G/20 ML
2 SYRINGE (ML) INTRAVENOUS ONCE
Status: COMPLETED | OUTPATIENT
Start: 2024-05-06 | End: 2024-05-06

## 2024-05-06 RX ORDER — SCOLOPAMINE TRANSDERMAL SYSTEM 1 MG/1
1 PATCH, EXTENDED RELEASE TRANSDERMAL ONCE
Status: DISCONTINUED | OUTPATIENT
Start: 2024-05-06 | End: 2024-05-06 | Stop reason: HOSPADM

## 2024-05-06 RX ORDER — OXYCODONE HYDROCHLORIDE 5 MG/1
5 TABLET ORAL EVERY 6 HOURS PRN
Qty: 20 TABLET | Refills: 0 | Status: SHIPPED | OUTPATIENT
Start: 2024-05-06

## 2024-05-06 RX ORDER — METRONIDAZOLE 500 MG/100ML
500 INJECTION, SOLUTION INTRAVENOUS ONCE
Status: COMPLETED | OUTPATIENT
Start: 2024-05-06 | End: 2024-05-06

## 2024-05-06 RX ORDER — DEXTROSE MONOHYDRATE 25 G/50ML
50 INJECTION, SOLUTION INTRAVENOUS
Status: DISCONTINUED | OUTPATIENT
Start: 2024-05-06 | End: 2024-05-06

## 2024-05-06 RX ORDER — KETOROLAC TROMETHAMINE 30 MG/ML
INJECTION, SOLUTION INTRAMUSCULAR; INTRAVENOUS AS NEEDED
Status: DISCONTINUED | OUTPATIENT
Start: 2024-05-06 | End: 2024-05-06 | Stop reason: SURG

## 2024-05-06 RX ORDER — HYDROMORPHONE HYDROCHLORIDE 1 MG/ML
INJECTION, SOLUTION INTRAMUSCULAR; INTRAVENOUS; SUBCUTANEOUS
Status: COMPLETED
Start: 2024-05-06 | End: 2024-05-06

## 2024-05-06 RX ORDER — NICOTINE POLACRILEX 4 MG
30 LOZENGE BUCCAL
Status: DISCONTINUED | OUTPATIENT
Start: 2024-05-06 | End: 2024-05-06

## 2024-05-06 RX ORDER — HYDROCODONE BITARTRATE AND ACETAMINOPHEN 5; 325 MG/1; MG/1
2 TABLET ORAL ONCE AS NEEDED
Status: DISCONTINUED | OUTPATIENT
Start: 2024-05-06 | End: 2024-05-06

## 2024-05-06 RX ORDER — DIBUCAINE 0.28 G/28G
1 OINTMENT TOPICAL EVERY 8 HOURS PRN
Qty: 56 G | Refills: 1 | Status: SHIPPED | OUTPATIENT
Start: 2024-05-06

## 2024-05-06 RX ORDER — HYDROCODONE BITARTRATE AND ACETAMINOPHEN 5; 325 MG/1; MG/1
1 TABLET ORAL ONCE AS NEEDED
Status: DISCONTINUED | OUTPATIENT
Start: 2024-05-06 | End: 2024-05-06

## 2024-05-06 RX ORDER — ACETAMINOPHEN 500 MG
1000 TABLET ORAL ONCE
Status: DISCONTINUED | OUTPATIENT
Start: 2024-05-06 | End: 2024-05-06 | Stop reason: HOSPADM

## 2024-05-06 RX ORDER — MIDAZOLAM HYDROCHLORIDE 1 MG/ML
1 INJECTION INTRAMUSCULAR; INTRAVENOUS EVERY 5 MIN PRN
Status: DISCONTINUED | OUTPATIENT
Start: 2024-05-06 | End: 2024-05-06

## 2024-05-06 RX ORDER — HYDROMORPHONE HYDROCHLORIDE 1 MG/ML
0.6 INJECTION, SOLUTION INTRAMUSCULAR; INTRAVENOUS; SUBCUTANEOUS EVERY 5 MIN PRN
Status: DISCONTINUED | OUTPATIENT
Start: 2024-05-06 | End: 2024-05-06

## 2024-05-06 RX ORDER — PHENYLEPHRINE HCL 10 MG/ML
VIAL (ML) INJECTION AS NEEDED
Status: DISCONTINUED | OUTPATIENT
Start: 2024-05-06 | End: 2024-05-06 | Stop reason: SURG

## 2024-05-06 RX ORDER — HYDROMORPHONE HYDROCHLORIDE 1 MG/ML
0.2 INJECTION, SOLUTION INTRAMUSCULAR; INTRAVENOUS; SUBCUTANEOUS EVERY 5 MIN PRN
Status: DISCONTINUED | OUTPATIENT
Start: 2024-05-06 | End: 2024-05-06

## 2024-05-06 RX ORDER — ACETAMINOPHEN 500 MG
1000 TABLET ORAL ONCE AS NEEDED
Status: DISCONTINUED | OUTPATIENT
Start: 2024-05-06 | End: 2024-05-06

## 2024-05-06 RX ADMIN — SODIUM CHLORIDE, SODIUM LACTATE, POTASSIUM CHLORIDE, CALCIUM CHLORIDE: 600; 310; 30; 20 INJECTION, SOLUTION INTRAVENOUS at 15:40:00

## 2024-05-06 RX ADMIN — MIDAZOLAM HYDROCHLORIDE 2 MG: 1 INJECTION INTRAMUSCULAR; INTRAVENOUS at 14:19:00

## 2024-05-06 RX ADMIN — KETOROLAC TROMETHAMINE 30 MG: 30 INJECTION, SOLUTION INTRAMUSCULAR; INTRAVENOUS at 14:45:00

## 2024-05-06 RX ADMIN — SODIUM CHLORIDE, SODIUM LACTATE, POTASSIUM CHLORIDE, CALCIUM CHLORIDE: 600; 310; 30; 20 INJECTION, SOLUTION INTRAVENOUS at 14:19:00

## 2024-05-06 RX ADMIN — ONDANSETRON 4 MG: 2 INJECTION INTRAMUSCULAR; INTRAVENOUS at 14:43:00

## 2024-05-06 RX ADMIN — CEFAZOLIN SODIUM/WATER 2 G: 2 G/20 ML SYRINGE (ML) INTRAVENOUS at 14:26:00

## 2024-05-06 RX ADMIN — PHENYLEPHRINE HCL 50 MCG: 10 MG/ML VIAL (ML) INJECTION at 14:38:00

## 2024-05-06 RX ADMIN — METRONIDAZOLE 500 MG: 500 INJECTION, SOLUTION INTRAVENOUS at 14:34:00

## 2024-05-06 RX ADMIN — LIDOCAINE HYDROCHLORIDE 30 MG: 10 INJECTION, SOLUTION EPIDURAL; INFILTRATION; INTRACAUDAL; PERINEURAL at 14:24:00

## 2024-05-06 NOTE — ANESTHESIA POSTPROCEDURE EVALUATION
[facility]    [place] Patient Status:  Hospital Outpatient Surgery   Age/Gender 64 year old male MRN [id number]   Location [facility] SURGERY Attending Brock Gillespie DO   Hosp Day # 0 PCP Loreto Leon MD       Anesthesia Post-op Note    ANAL EXAMINATION UNDER ANESTHESIA, FISTULOTOMY, SETON PLACEMENT    Procedure Summary       Date: 05/06/24 Room / Location:  MAIN OR [address] [place] [address] MAIN OR    Anesthesia Start: 1419 Anesthesia Stop: 1540    Procedure: ANAL EXAMINATION UNDER ANESTHESIA, FISTULOTOMY, SETON PLACEMENT (Anus) Diagnosis:       Anal fistula      (Anal fistula [K60.3])    Surgeons: Brock Gillespie DO Anesthesiologist: Sung Zacarias MD    Anesthesia Type: general ASA Status: 2            Anesthesia Type: general    Vitals Value Taken Time   /75 05/06/24 1540   Temp 98.4 05/06/24 1540   Pulse 80 05/06/24 1540   Resp 16 05/06/24 1540   SpO2 99 05/06/24 1540       Patient Location: PACU    Anesthesia Type: general    Airway Patency: extubated    Postop Pain Control: adequate    Mental Status: mildly sedated but able to meaningfully participate in the post-anesthesia evaluation    Nausea/Vomiting: none    Cardiopulmonary/Hydration status: stable euvolemic    Complications: no apparent anesthesia related complications    Postop vital signs: stable    Dental Exam: Unchanged from Preop

## 2024-05-06 NOTE — ANESTHESIA PREPROCEDURE EVALUATION
PRE-OP EVALUATION    Patient Name: [place]    Admit Diagnosis: Anal fistula [K60.3]    Pre-op Diagnosis: Anal fistula [K60.3]    ANAL EXAMINATION UNDER ANESTHESIA, FISTULOTOMY, SETON PLACEMENT    Anesthesia Procedure: ANAL EXAMINATION UNDER ANESTHESIA, FISTULOTOMY, SETON PLACEMENT    Surgeons and Role:     * Brock Gillespie DO - Primary    Pre-op vitals reviewed.        Body mass index is 27.12 kg/m².    Current medications reviewed.  Hospital Medications:  No current facility-administered medications on file as of .       Outpatient Medications:     No medications prior to admission.       Allergies: Patient has no known allergies.      Anesthesia Evaluation    Patient summary reviewed.    Anesthetic Complications  (-) history of anesthetic complications         GI/Hepatic/Renal                                 Cardiovascular        Exercise tolerance: good     MET: >4      (+) hypertension                                     Endo/Other                           (+) arthritis       Pulmonary                           Neuro/Psych        (+) anxiety                      Patient Active Problem List:     Other psoriasis     Primary osteoarthritis of left knee     Primary osteoarthritis of right knee     AC separation, type 3, right, initial encounter     Anal abscess     Anal fistula     Post-operative state     Pruritus ani     Nonhealing surgical wound, sequela            Past Surgical History:   Procedure Laterality Date    Colonoscopy N/A 12/14/2018    Procedure: COLONOSCOPY;  Surgeon: Brock Gillespie DO;  Location:  ENDOSCOPY    Other surgical history      rectal 7/23 10/23     Social History     Socioeconomic History    Marital status:    Tobacco Use    Smoking status: Never    Smokeless tobacco: Never    Tobacco comments:     non-smoker   Vaping Use    Vaping status: Never Used   Substance and Sexual Activity    Alcohol use: No     Alcohol/week: 0.0 standard drinks of alcohol    Drug  use: No     History   Drug Use No     Available pre-op labs reviewed.               Airway      Mallampati: II  Mouth opening: >3 FB  TM distance: 4 - 6 cm  Neck ROM: full Cardiovascular      Rhythm: regular  Rate: normal     Dental    Dentition appears grossly intact         Pulmonary      Breath sounds clear to auscultation bilaterally.               Other findings              ASA: 2   Plan: general  NPO status verified and patient meets guidelines.    Post-procedure pain management plan discussed with surgeon and patient.      Plan/risks discussed with: patient and spouse                Present on Admission:  **None**

## 2024-05-06 NOTE — DISCHARGE INSTRUCTIONS
Home Care Instructions       WHAT TO EXPECT  -Complete recovery after Anal surgery can take up to 6 or 8 weeks or more.     -During the first week you may experience intense anal pain. By the second week your pain will be significantly improved.    -A small amount of bleeding is common following anal surgery. A sanitary napkin or gauze may be worn over the anal opening to catch any drainage. If there is prolonged or profuse bleeding with passage of clots, call the office immediately.     -To avoid constipation take Metamucil or other fiber supplement product (Benefiber, Citrucel, Konsyl, etc) one teaspoon daily. Take a stool softener such as miralax once daily. If you have not had a bowel movement in 48 hours following surgery, Start taking Miralax every 4 - 6 hours until you have a bowel movement. If another 24 hours passes without bowel movement, drink one bottle of magnesium citrate. Following the first bowel movement, you should have a bowel movement at least every other day. If 2 days pass without a bowel movement, take an ounce of milk of magnesia. Repeat in 6 hours if no result.     -Multiple loose stools can irritate the anus and your incisions. If you begin having loose stools, stop taking any stool softeners (miralax, magnesium citrate, milk of magnesia). You can continue to take the fiber supplements. The goal should be 1 - 3 formed bowel movements daily.    -Difficulty urinating after anal surgery is common. Getting the pain under control and relaxing the pelvic floor muscles usually allows for the urine to pass. While soaking in a warm bath, attempt to relax the bladder and urinate into the water. If you are unable to urinate in the first eight hours after your surgery, notify the doctor’s office. After hours, go to the nearest emergency room or urgent care center. A bladder catheter will be placed and remain in place for 2 days, you may call the office to have the catheter removed.      MEDICATIONS  -You can take Tylenol 1000mg (2 Extra Strength Tylenol) every 6 hours for pain. For the first 48 hours it is best to take the Tylenol every 6 hours even if you do not feel much pain.     -For moderate to severe post-operative pain control you will be prescribed Tramadol or Oxycodone. Take one pill every six hours as needed for pain. If you do not feel that narcotics are necessary you shouldn’t take them. If the pain is severe then you may take two pills every six hours, taking care not to exceed 8 pills in a 24 hour period.     -After 48 hours, you may also add Ibuprofen 800mg every 8 hours or Naproxen 400 - 500 mg every 12 hours.   Next ibuprofen dose on or after 8:45pm.    -Do not take Aspirin for seven days after surgery. Ask your surgeon about restarting any other blood thinners.    -All other home medications may be resumed as scheduled.     WOUND CARE  -There is a wound located just inside the anus extending to the skin. You should keep a dry gauze over the wound. This should be changed at least twice daily and as needed for saturation. New gauze should be placed after each bowel movement as well.     -Take warm water baths, either in a bathtub or a Sitz basin, or warm shower with irrigation of the perianal skin 3-4 times daily for 15 - 20 minutes, and after each bowel movement.  This will help relax the pelvic floor muscles, soothe your pain,  cleanse the anus, and help you urinate.     -You should also ideally take a quick bath or shower after each bowel movement to keep the anus clean. You may also use a Perla bottle to irrigate the anus. If you must wipe after bowel movements, be very gentle and moisten the toilet tissue with water.    -Avoid applying any ointments or lubricants to the anus. Do not use baby wipe or other medicated wipes. The chemicals in these items can irritate the anus and surrounding skin.     ACTIVITY   -Avoid strenuous activity for 1-2 weeks after your procedure.      DIET   -Eat a regular diet including plenty of fresh fruit and vegetables. Drink 6-8 glasses of water a day. Avoid spicy foods.      APPOINTMENT  Please call our office as soon as possible for an appointment at [phone]. Please call our office immediately for fever greater than 100.5, extensive bleeding, inability to urinate.  For life threatening emergencies such as severe chest pain, shortness of breath, loss of conciousness call 911. For non-emergent care please call our office after 8:30 a.m. Monday through Friday. The number above directs to the answering service after hours to reach the on-call physician.

## 2024-05-06 NOTE — ANESTHESIA PROCEDURE NOTES
Airway  Date/Time: 5/6/2024 2:24 PM  Urgency: elective    Airway not difficult    General Information and Staff    Patient location during procedure: OR  Anesthesiologist: Sung Zacarias MD  Performed: anesthesiologist   Performed by: Sung Zacarias MD  Authorized by: Sung Zacarias MD      Indications and Patient Condition  Indications for airway management: anesthesia  Sedation level: deep  Preoxygenated: yes  Patient position: sniffing  Mask difficulty assessment: 1 - vent by mask    Final Airway Details  Final airway type: endotracheal airway      Successful airway: ETT  Cuffed: yes   Successful intubation technique: direct laryngoscopy  Endotracheal tube insertion site: oral  Blade: Viral  Blade size: #3  ETT size (mm): 7.5    Cormack-Lehane Classification: grade IIA - partial view of glottis  Placement verified by: capnometry   Measured from: teeth  ETT to teeth (cm): 22  Number of attempts at approach: 1

## 2024-05-06 NOTE — BRIEF OP NOTE
Pre-Operative Diagnosis: Anal fistula [K60.3]     Post-Operative Diagnosis: Anal fistula [K60.3]      Procedure Performed:   ANAL EXAMINATION UNDER ANESTHESIA, FISTULOTOMY, SETON PLACEMENT    Surgeons and Role:     * Brock Gillespie DO - Primary    Assistant(s):        Surgical Findings:        Specimen:        Estimated Blood Loss: No data recorded    Dictation Number:        Brock Gillespie DO  5/6/2024  3:27 PM

## 2024-05-06 NOTE — H&P
Patient presents with anal fistula.  Patient underwent incision and drainage perirectal abscess by my partner Dr. Fox over 2 years ago.  Patient subsequently developed fistula for which he performed anal fistulotomy with seton placement.  The patient was essentially symptom-free for approximately 1 year at which point he redeveloped a perirectal abscess.  Dr. Suarez took the patient to the OR drain the abscess and attempted to find the internal fistulous opening without success.  Patient now presents with continued drainage from the perianal area.         .               Past Medical History       Past Medical History:    Acute bronchitis    Acute bronchospasm    Anxiety state, unspecified    Chest pain, unspecified    Essential hypertension, benign    High blood pressure    Nonspecific abnormal electrocardiogram (ECG) (EKG)    Other abnormal blood chemistry    Sleep disturbance, unspecified    Visual impairment     CONTACTS GLASSES         Past Surgical History         Past Surgical History:   Procedure Laterality Date    Colonoscopy N/A 12/14/2018     Procedure: COLONOSCOPY;  Surgeon: Brock Gillespie DO;  Location:  ENDOSCOPY    Other surgical history         rectal 7/23 10/23         Medications Ordered Prior to Encounter          Current Outpatient Medications on File Prior to Visit   Medication Sig Dispense Refill    HYDROcodone-acetaminophen (NORCO) 5-325 MG Oral Tab Take 1 tablet by mouth every 6 (six) hours as needed for Pain. 20 tablet 0    traZODone 50 MG Oral Tab Take 0.5 tablets (25 mg total) by mouth nightly. 45 tablet 3    PEG 3350-KCl-Na Bicarb-NaCl (TRILYTE) 420 g Oral Recon Soln Starting at 4:00 pm the night before procedure, drink 8 ounces of the prep every 15-20 minutes until finished 1 each 0    diclofenac 75 MG Oral Tab EC Take 1 tablet (75 mg total) by mouth as needed.        ustekinumab (STELARA) 90 MG/ML Subcutaneous Solution Prefilled Syringe injection Inject 1 syringe into the skin  every 12 weeks 1 mL 1    Multiple Vitamins-Minerals (MULTIVITAL-M) Oral Tab Take by mouth.        Ascorbic Acid (VITAMIN C) 1000 MG Oral Tab Take 1 tablet (1,000 mg total) by mouth daily.          No current facility-administered medications on file prior to visit.         @ALL@  Family History         Family History   Problem Relation Age of Onset    Heart Disease Father      Cancer Other           Grandfather with Lung cancer    Stroke Other           Grandfather          Short Social Hx on File   Social History            Socioeconomic History    Marital status:    Tobacco Use    Smoking status: Never    Smokeless tobacco: Never    Tobacco comments:       non-smoker   Vaping Use    Vaping status: Never Used   Substance and Sexual Activity    Alcohol use: No       Alcohol/week: 0.0 standard drinks of alcohol    Drug use: No      Social Determinants of Health        [facility]     Housing Stability            ROS      GENERAL HEALTH: otherwise feels well, no weight loss, no fever or chills  SKIN: denies any unusual skin rashes or jaundice  HEENT: denies nasal congestion, sinus pain or sore throat; hearing loss negative,   EYES , no diplopia or vision changes  RESPIRATORY: denies shortness of breath, wheezing or cough   CARDIOVASCULAR: denies chest pain or WOOTEN; no palpitations   GI: denies nausea, vomiting, constipation, diarrhea; no rectal bleeding; no heartburn  GENITAL/: no dysuria, urgency or frequency, no tea colored urine  MUSCULOSKELETAL: no joint complaints upper or lower extremities  HEMATOLOGY: denies hx anemia; denies bruising or excessive bleeding  Endocrine: no weight gain or loss no hot or cold intolerance     EXAM      Pulse 75, temperature 97.5 °F (36.4 °C), temperature source Temporal.  GENERAL: well developed, well nourished male, in no apparent distress.    MENTAL STATUS :Alert, oriented x 3  PSYCH: normal mood and  affect  SKIN: anicteric, no rashes, no bruising  EYES: PERRLA, EOMI, sclera anicteric,  conjunctiva without pallor  HEENT: normocephalic, atraumatic, TMs clear, nares patent, mouth moist, pharynx w/o erythema  NECK: supple, no JVD, no adenopathy, no thyromegaly  RESPIRATORY: clear to auscultation, no rales , rhonchi or wheezing  CARDIOVASCULAR: RRR, murmur negative  ABDOMEN: normal active BS, soft, nondistended  no HSM, no masses or hernias  LYMPHATIC: no axillary , supraclavicular or inguinal lymphadenopathy  EXTREMITIES: no cyanosis, clubbing or edema, no atrophy, full ROM  Rectal examination performed with Rikki SCOTT demonstrates evidence of a posterolateral anal fistula chronic tract I attempted to probe the tract which appeared to extend to the dorsal midline.  STUDIES:              [facility] Lab Encounter on 10/17/2023   Component Date Value Ref Range Status    Prostate Specific Antigen Screen 10/17/2023 3.22  <=4.00 ng/mL Final     Comment: INTERPRETIVE INFORMATION: TOTAL PROSTATE SPECIFIC ANTIGEN:     The Siemens Total PSA(TPSA)chemiluminescent (LOCI) immunoassay was used.  Results obtained with different test methods or kits cannot be used interchangeably.  The Siemens TPSA is approved for use as an aid in the detection of prostate cancer when used in conjuction with a digital rectal exam in men age 50 or older.     The Siemens TPSA method is also indicated for use as an aid in the management monitoring of prostate cancer patients.  Elevated PSA concentrations can only suggest the presence of prostate cancer until biopsy is performed, which is required for diagnosis of cancer.  PSA concentrations can be elevated in the prostate.  PSA is generally not elevated in healthy men or men with non-prostatic carcinoma.           This test may exhibit interference when a sample is collected from a person who is consuming high dose of biotin (a.k.a., vitamin B7, vitamin H, coenzyme R) supplements resulting in serum  concentrations >100                             ng/mL.  Intake of the recommended daily allowance (RDA) for biotin (0.03 mg) has not been shown to typically cause significant interference; however, high dose daily dietary supplements may contain biotin concentrations greater than 150 times (5-10 mg) the RDA.  It is recommended that physicians ask all patients who may be on biotin supplementation to stop biotin consumption at least 72 hours prior to collection of a new sample.       Cholesterol, Total 10/17/2023 172  <200 mg/dL Final     Desirable  <200 mg/dL  Borderline  200-239 mg/dL  High      >=240 mg/dL          HDL Cholesterol 10/17/2023 41  40 - 59 mg/dL Final     Interpretive Information:   An HDL cholesterol <40 mg/dL is low and constitutes a coronary heart disease risk factor. An HDL cholesterol >60 mg/dL is a negative risk factor for coronary heart disease.          Triglycerides 10/17/2023 80  30 - 149 mg/dL Final     Reference interval for fasting triglycerides  Desirable: <150 mg/dL  Borderline: 150-199 mg/dL  High: 200-499 mg/dL  Very High: >=500 mg/dL             LDL Cholesterol 10/17/2023 116 (H)  <100 mg/dL Final     Desirable <100 mg/dL   Borderline 100-129 mg/dL   High     >=130mg/dL          VLDL 10/17/2023 14  0 - 30 mg/dL Final    Non HDL Chol 10/17/2023 131 (H)  <130 mg/dL Final     Desirable  <130 mg/dL   Borderline  130-159 mg/dL   High        160-189 mg/dL       Very high >=190 mg/dL          Patient Fasting for Lipid? 10/17/2023 Yes    Final    TSH 10/17/2023 1.650  0.358 - 3.740 mIU/mL Final     This test may exhibit interference when a sample is collected from a person who is consuming high dose of biotin (a.k.a., vitamin B7, vitamin H, coenzyme R) supplements resulting in serum concentrations >100 ng/mL.  Intake of the recommended daily allowance (RDA) for biotin (0.03 mg) has not been shown to typically cause significant interference; however, high dose daily dietary supplements may  contain biotin concentrations greater than 150 times (5-10 mg) the RDA.  It is recommended that physicians ask all patients who may be on biotin supplementation to stop biotin consumption at least 72 hours prior to collection of a new sample.       Glucose 10/17/2023 76  70 - 99 mg/dL Final    Sodium 10/17/2023 138  136 - 145 mmol/L Final    Potassium 10/17/2023 4.5  3.5 - 5.1 mmol/L Final    Chloride 10/17/2023 104  98 - 112 mmol/L Final    CO2 10/17/2023 29.0  21.0 - 32.0 mmol/L Final    Anion Gap 10/17/2023 5  0 - 18 mmol/L Final    BUN 10/17/2023 19 (H)  7 - 18 mg/dL Final    Creatinine 10/17/2023 1.25  0.70 - 1.30 mg/dL Final    Calcium, Total 10/17/2023 8.8  8.5 - 10.1 mg/dL Final    Calculated Osmolality 10/17/2023 287  275 - 295 mOsm/kg Final    eGFR-Cr 10/17/2023 66  >=60 mL/min/1.73m2 Final    AST 10/17/2023 20  15 - 37 U/L Final    ALT 10/17/2023 31  16 - 61 U/L Final    Alkaline Phosphatase 10/17/2023 63  45 - 117 U/L Final    Bilirubin, Total 10/17/2023 2.3 (H)  0.1 - 2.0 mg/dL Final    Total Protein 10/17/2023 7.2  6.4 - 8.2 g/dL Final    Albumin 10/17/2023 4.0  3.4 - 5.0 g/dL Final    Globulin  10/17/2023 3.2  2.8 - 4.4 g/dL Final    A/G Ratio 10/17/2023 1.3  1.0 - 2.0 Final    Patient Fasting for CMP? 10/17/2023 Yes    Final    WBC 10/17/2023 6.6  4.0 - 11.0 x10(3) uL Final    RBC 10/17/2023 4.68  4.30 - 5.70 x10(6)uL Final    HGB 10/17/2023 13.9  13.0 - 17.5 g/dL Final    HCT 10/17/2023 40.7  39.0 - 53.0 % Final    PLT 10/17/2023 333.0  150.0 - 450.0 10(3)uL Final    MCV 10/17/2023 87.0  80.0 - 100.0 fL Final    MCH 10/17/2023 29.7  26.0 - 34.0 pg Final    MCHC 10/17/2023 34.2  31.0 - 37.0 g/dL Final    RDW 10/17/2023 12.2  % Final         ASSESSMENT   Imp: Recurrent perianal fistula  PLan: 2 OR for anal exam under anesthesia with fistulotomy and seton placement discussed with patient risk of bleeding infection and recurrence all questions answered.  Advised him that if it did recur I would refer  him to my partner Dr. Vang for endorectal flap.

## 2024-05-07 NOTE — OPERATIVE REPORT
[facility]    PATIENT'S NAME: LUIS ALBERTO JONES   ATTENDING PHYSICIAN: Brock Gillespie D.O.   OPERATING PHYSICIAN: Brock Gillespie D.O.   PATIENT ACCOUNT#:   [id number]    LOCATION:  [facility] 10 [facility] 10  MEDICAL RECORD #:   [id number]       YOB: 1960  ADMISSION DATE:       05/06/2024      OPERATION DATE:  05/06/2024    OPERATIVE REPORT    PREOPERATIVE DIAGNOSIS:  Chronic recurrent anal fistula.  POSTOPERATIVE DIAGNOSIS:  Chronic recurrent anal fistula.  PROCEDURE:  Anal examination under anesthesia with fistulotomy and seton placement.    ANESTHESIA:  General.    COMPLICATIONS:  None.    OPERATIVE TECHNIQUE:  An informed consent was previously obtained.  The patient was taken to the operative suite and placed in the supine position, and general inhalational anesthetic was administered by the anesthesia department.  The patient was then placed in prone jackknife position with buttocks taped widely apart, and the area was prepped and draped in usual sterile fashion.  Anoscopy was carried out demonstrating evidence of an obvious external fistula site at the left posterior position.  Using multiple probes as well as hydrogen peroxide, allowed me to delineate an internal opening that extended from the external opening in a radial fashion from the external opening.    This mucosa was opened with a handheld electrocautery.  The rectal mucosa as well as anoderm and ischiorectal tissues were divided using handheld electrocautery.  Once the sphincter muscle was identified, no further cutting was carried out.    The probe was then replaced with an umbilical tape which was then used to gently curettage the fistulous tract deep to the fistula.  This was replaced with a #5 Ethibond suture, and this was tied tightly at the sphincter muscle and the suture ends left long.  Then, 20 mL of 0.5% Marcaine with epinephrine was injected into the surrounding perianal tissues.  Gelfoam, dibucaine pack  placed in the anal canal.  The patient was gauzed and ABD applied over the rectum.  The patient was extubated in the operative suite and transported to Recovery in stable condition.    Dictated By Brock Gillespie D.O.  d: 05/06/2024 15:29:56  t: 05/06/2024 19:24:44  [facility] [id number]  RM/

## 2024-05-21 ENCOUNTER — OFFICE VISIT (OUTPATIENT)
Facility: LOCATION | Age: 64
End: 2024-05-21

## 2024-05-21 VITALS — TEMPERATURE: 98 F | HEART RATE: 79 BPM

## 2024-05-21 DIAGNOSIS — K60.3 ANAL FISTULA: ICD-10-CM

## 2024-05-21 DIAGNOSIS — Z98.890 POSTOPERATIVE STATE: Primary | ICD-10-CM

## 2024-05-21 PROCEDURE — 99024 POSTOP FOLLOW-UP VISIT: CPT

## 2024-05-21 NOTE — PROGRESS NOTES
Follow Up Visit Note       Active Problems      1. Postoperative state    2. Anal fistula          Chief Complaint   Chief Complaint   Patient presents with    Post-Op     PO - 5/6 w/ RWM - ANAL EXAMINATION UNDER ANESTHESIA, FISTULOTOMY, SETON PLACEMENT, Pt. States slight pain at times, Pt. Denies n/v/d Pt. Denies fever/chills, Pt. States drainage greyish color of drainage, Pt. States slight bleeding.              History of Present Illness  Seven is a 64 year old male who underwent anal examination under anesthesia, fistulotomy, and seton placement with Dr. Gillespie on 5/6/2024. He presents to clinic today for follow up evaluation.     He reports doing well since the procedure. He reports he has not required any pain medication. He reports in the first week he had mild bloody drainage but states this has significant improved. He reports drainage is now a gray color, which is similar to the drainage he had prior to the procedure. He denies fever or chills. He denies abdominal pain, nausea or vomiting. He denies diarrhea or constipation. He reports keeping the area clean and uses a bidet at home. He reports keeping a dry gauze to the area and changing as needed for saturation.     Allergies  Seven has No Known Allergies.    Past Medical / Surgical / Social / Family History    The past medical and past surgical history have been reviewed by me today.    Past Medical History:    Acute bronchitis    Acute bronchospasm    Anxiety state, unspecified    NO LONGER AN ISSUE    Chest pain, unspecified    Essential hypertension, benign    High blood pressure    PT DENIES, NO MEDICATION/TREATMENT    Nonspecific abnormal electrocardiogram (ECG) (EKG)    Other abnormal blood chemistry    Sleep disturbance, unspecified    Visual impairment    CONTACTS GLASSES     Past Surgical History:   Procedure Laterality Date    Colonoscopy N/A 12/14/2018    Procedure: COLONOSCOPY;  Surgeon: Brock Gillespie DO;  Location:  ENDOSCOPY     Other surgical history      rectal 7/23 10/23       The family history and social history have been reviewed by me today.    Family History   Problem Relation Age of Onset    Heart Disease Father     Cancer Other         Grandfather with Lung cancer    Stroke Other         Grandfather      Social History     Socioeconomic History    Marital status:    Tobacco Use    Smoking status: Never    Smokeless tobacco: Never    Tobacco comments:     non-smoker   Vaping Use    Vaping status: Never Used   Substance and Sexual Activity    Alcohol use: No     Alcohol/week: 0.0 standard drinks of alcohol    Drug use: No        Current Outpatient Medications:     oxyCODONE 5 MG Oral Tab, Take 1 tablet (5 mg total) by mouth every 6 (six) hours as needed for Pain., Disp: 20 tablet, Rfl: 0    dibucaine 1 % External Ointment, Apply 1 Application topically every 8 (eight) hours as needed., Disp: 56 g, Rfl: 1    diclofenac 75 MG Oral Tab EC, Take 1 tablet (75 mg total) by mouth as needed., Disp: , Rfl:     ustekinumab (STELARA) 90 MG/ML Subcutaneous Solution Prefilled Syringe injection, Inject 1 syringe into the skin every 12 weeks, Disp: 1 mL, Rfl: 1    Multiple Vitamins-Minerals (MULTIVITAL-M) Oral Tab, Take by mouth., Disp: , Rfl:     Ascorbic Acid (VITAMIN C) 1000 MG Oral Tab, Take 1 tablet (1,000 mg total) by mouth daily., Disp: , Rfl:      Review of Systems  The Review of Systems has been reviewed by me during today.  Review of Systems   Constitutional:  Negative for chills, fatigue and fever.   Gastrointestinal:  Negative for abdominal distention, constipation, diarrhea, nausea and vomiting.   Genitourinary:  Negative for difficulty urinating, dysuria and urgency.   Skin:  Positive for wound. Negative for rash.        Physical Findings   Pulse 79   Temp 98.2 °F (36.8 °C) (Temporal)   Physical Exam  Vitals reviewed. Exam conducted with a chaperone present (SONIA Griffith).   Constitutional:       General: He is not in acute  distress.     Appearance: He is not ill-appearing.   HENT:      Head: Normocephalic and atraumatic.   Eyes:      General: No scleral icterus.     Conjunctiva/sclera: Conjunctivae normal.   Pulmonary:      Effort: Pulmonary effort is normal. No respiratory distress.   Abdominal:      General: Abdomen is flat. There is no distension.      Palpations: Abdomen is soft.      Tenderness: There is no abdominal tenderness. There is no guarding or rebound.   Genitourinary:     Comments: Clinical exam limited to external exam: Seton in place to left 8 o'clock position with mild serous drainage noted. No bloody drainage. No surrounding erythema. No fluctuance to palpation. No signs of infection.   Skin:     General: Skin is warm and dry.      Coloration: Skin is not jaundiced.   Neurological:      Mental Status: He is alert.   Psychiatric:         Mood and Affect: Mood normal.         Behavior: Behavior normal.          Assessment   1. Postoperative state    2. Anal fistula        Plan   The patient is doing well postoperatively.  Continue Diet as tolerated. Encourage a high fiber diet  Tylenol and Ibuprofen as needed for pain control.   Continue local wound care, soap and water to the incisions. Sitz baths. Keep a dry gauze to the area for drainage and change twice daily or as needed for saturation  Avoid lifting greater than 15-20 lbs  All the patient's questions and concerns were addressed. They voiced understanding and are in agreement with the plan    Follow Up  He is scheduled to follow up with Dr. Gillespie in 2-3 weeks to discuss seton removal, sooner if needed    Luana Briscoe PA-C

## 2024-05-30 ENCOUNTER — OFFICE VISIT (OUTPATIENT)
Facility: LOCATION | Age: 64
End: 2024-05-30

## 2024-05-30 VITALS — TEMPERATURE: 98 F | HEART RATE: 86 BPM

## 2024-05-30 DIAGNOSIS — K61.0 PERIANAL ABSCESS: Primary | ICD-10-CM

## 2024-05-30 PROCEDURE — 99214 OFFICE O/P EST MOD 30 MIN: CPT | Performed by: SURGERY

## 2024-05-30 RX ORDER — CEPHALEXIN 500 MG/1
500 CAPSULE ORAL 2 TIMES DAILY
Qty: 20 CAPSULE | Refills: 0 | Status: SHIPPED | OUTPATIENT
Start: 2024-05-30 | End: 2024-06-09

## 2024-05-30 NOTE — H&P
Seven Lake is a 64 year old male  Chief Complaint   Patient presents with    Post-Op     PO - ANAL EXAMINATION UNDER ANESTHESIA, FISTULOTOMY, SETON PLACEMENT 5/6/24, painful, drainage (large amounts), no other symptoms.       REFERRED BY    Patient presents with increasing perirectal pain.  Patient states that initially he has pain following surgery was slowly improving however has worsened in the past 4 to 5 days.  Patient states he remains active working out and undergoing full physical activity.  He takes no pain medications.  He states the drainage in the surgical area has increased in the past few days.  Patient has a history of recurrent perirectal abscess and perianal fistula.       .           Past Medical History:    Acute bronchitis    Acute bronchospasm    Anxiety state, unspecified    NO LONGER AN ISSUE    Chest pain, unspecified    Essential hypertension, benign    High blood pressure    PT DENIES, NO MEDICATION/TREATMENT    Nonspecific abnormal electrocardiogram (ECG) (EKG)    Other abnormal blood chemistry    Sleep disturbance, unspecified    Visual impairment    CONTACTS GLASSES     Past Surgical History:   Procedure Laterality Date    Colonoscopy N/A 12/14/2018    Procedure: COLONOSCOPY;  Surgeon: Brock Gillespie DO;  Location:  ENDOSCOPY    Other surgical history      rectal 7/23 10/23     Current Outpatient Medications on File Prior to Visit   Medication Sig Dispense Refill    oxyCODONE 5 MG Oral Tab Take 1 tablet (5 mg total) by mouth every 6 (six) hours as needed for Pain. 20 tablet 0    dibucaine 1 % External Ointment Apply 1 Application topically every 8 (eight) hours as needed. 56 g 1    diclofenac 75 MG Oral Tab EC Take 1 tablet (75 mg total) by mouth as needed.      ustekinumab (STELARA) 90 MG/ML Subcutaneous Solution Prefilled Syringe injection Inject 1 syringe into the skin every 12 weeks 1 mL 1    Multiple Vitamins-Minerals (MULTIVITAL-M) Oral Tab Take by mouth.       Ascorbic Acid (VITAMIN C) 1000 MG Oral Tab Take 1 tablet (1,000 mg total) by mouth daily.       No current facility-administered medications on file prior to visit.     @ALL@  Family History   Problem Relation Age of Onset    Heart Disease Father     Cancer Other         Grandfather with Lung cancer    Stroke Other         Grandfather      Social History     Socioeconomic History    Marital status:    Tobacco Use    Smoking status: Never    Smokeless tobacco: Never    Tobacco comments:     non-smoker   Vaping Use    Vaping status: Never Used   Substance and Sexual Activity    Alcohol use: No     Alcohol/week: 0.0 standard drinks of alcohol    Drug use: No     Social Determinants of Health      [facility]    Housing Stability       [facility]     GENERAL HEALTH: otherwise feels well, no weight loss, no fever or chills  SKIN: denies any unusual skin rashes or jaundice  HEENT: denies nasal congestion, sinus pain or sore throat; hearing loss negative,   EYES , no diplopia or vision changes  RESPIRATORY: denies shortness of breath, wheezing or cough   CARDIOVASCULAR: denies chest pain or WOOTEN; no palpitations   GI: denies nausea, vomiting, constipation, diarrhea; no rectal bleeding; no heartburn  GENITAL/: no dysuria, urgency or frequency, no tea colored urine  MUSCULOSKELETAL: no joint complaints upper or lower extremities  HEMATOLOGY: denies hx anemia; denies bruising or excessive bleeding  Endocrine: no weight gain or loss no hot or cold intolerance    EXAM     Pulse 86, temperature 98.1 °F (36.7 °C), temperature source Temporal.  GENERAL: well developed, well nourished male, in no apparent distress.    MENTAL STATUS :Alert, oriented x 3  PSYCH: normal mood and affect  SKIN: anicteric, no rashes, no bruising  EYES: PERRLA, EOMI, sclera anicteric,  conjunctiva without pallor  HEENT: normocephalic, atraumatic, TMs clear, nares patent, mouth moist,  pharynx w/o erythema  NECK: supple, no JVD, no adenopathy, no thyromegaly  RESPIRATORY: clear to auscultation, no rales , rhonchi or wheezing  CARDIOVASCULAR: RRR, murmur negative  ABDOMEN: normal active BS, soft, nondistended  no HSM, no masses or hernias  LYMPHATIC: no axillary , supraclavicular or inguinal lymphadenopathy  EXTREMITIES: no cyanosis, clubbing or edema, no atrophy, full ROM  [facility] no evidence of fluctuance no evidence of abscess seton in place left lateral position.  There appears to be a new shallow fistulous opening at the anterior midline with purulent drainage.  STUDIES:     [facility] Lab Encounter on 10/17/2023   Component Date Value Ref Range Status    Prostate Specific Antigen Screen 10/17/2023 3.22  <=4.00 ng/mL Final    Comment: INTERPRETIVE INFORMATION: TOTAL PROSTATE SPECIFIC ANTIGEN:    The Siemens Total PSA(TPSA)chemiluminescent (LOCI) immunoassay was used.  Results obtained with different test methods or kits cannot be used interchangeably.  The Siemens TPSA is approved for use as an aid in the detection of prostate cancer when used in conjuction with a digital rectal exam in men age 50 or older.    The Siemens TPSA method is also indicated for use as an aid in the management monitoring of prostate cancer patients.  Elevated PSA concentrations can only suggest the presence of prostate cancer until biopsy is performed, which is required for diagnosis of cancer.  PSA concentrations can be elevated in the prostate.  PSA is generally not elevated in healthy men or men with non-prostatic carcinoma.        This test may exhibit interference when a sample is collected from a person who is consuming high dose of biotin (a.k.a., vitamin B7, vitamin H, coenzyme R) supplements resulting in serum concentrations >100                            ng/mL.  Intake of the recommended daily allowance (RDA) for biotin (0.03 mg) has not been shown to typically cause  significant interference; however, high dose daily dietary supplements may contain biotin concentrations greater than 150 times (5-10 mg) the RDA.  It is recommended that physicians ask all patients who may be on biotin supplementation to stop biotin consumption at least 72 hours prior to collection of a new sample.      Cholesterol, Total 10/17/2023 172  <200 mg/dL Final    Desirable  <200 mg/dL  Borderline  200-239 mg/dL  High      >=240 mg/dL        HDL Cholesterol 10/17/2023 41  40 - 59 mg/dL Final    Interpretive Information:   An HDL cholesterol <40 mg/dL is low and constitutes a coronary heart disease risk factor. An HDL cholesterol >60 mg/dL is a negative risk factor for coronary heart disease.        Triglycerides 10/17/2023 80  30 - 149 mg/dL Final    Reference interval for fasting triglycerides  Desirable: <150 mg/dL  Borderline: 150-199 mg/dL  High: 200-499 mg/dL  Very High: >=500 mg/dL          LDL Cholesterol 10/17/2023 116 (H)  <100 mg/dL Final    Desirable <100 mg/dL   Borderline 100-129 mg/dL   High     >=130mg/dL        VLDL 10/17/2023 14  0 - 30 mg/dL Final    Non HDL Chol 10/17/2023 131 (H)  <130 mg/dL Final    Desirable  <130 mg/dL   Borderline  130-159 mg/dL   High        160-189 mg/dL       Very high >=190 mg/dL        Patient Fasting for Lipid? 10/17/2023 Yes   Final    TSH 10/17/2023 1.650  0.358 - 3.740 mIU/mL Final    This test may exhibit interference when a sample is collected from a person who is consuming high dose of biotin (a.k.a., vitamin B7, vitamin H, coenzyme R) supplements resulting in serum concentrations >100 ng/mL.  Intake of the recommended daily allowance (RDA) for biotin (0.03 mg) has not been shown to typically cause significant interference; however, high dose daily dietary supplements may contain biotin concentrations greater than 150 times (5-10 mg) the RDA.  It is recommended that physicians ask all patients who may be on biotin supplementation to stop biotin  consumption at least 72 hours prior to collection of a new sample.      Glucose 10/17/2023 76  70 - 99 mg/dL Final    Sodium 10/17/2023 138  136 - 145 mmol/L Final    Potassium 10/17/2023 4.5  3.5 - 5.1 mmol/L Final    Chloride 10/17/2023 104  98 - 112 mmol/L Final    CO2 10/17/2023 29.0  21.0 - 32.0 mmol/L Final    Anion Gap 10/17/2023 5  0 - 18 mmol/L Final    BUN 10/17/2023 19 (H)  7 - 18 mg/dL Final    Creatinine 10/17/2023 1.25  0.70 - 1.30 mg/dL Final    Calcium, Total 10/17/2023 8.8  8.5 - 10.1 mg/dL Final    Calculated Osmolality 10/17/2023 287  275 - 295 mOsm/kg Final    eGFR-Cr 10/17/2023 66  >=60 mL/min/1.73m2 Final    AST 10/17/2023 20  15 - 37 U/L Final    ALT 10/17/2023 31  16 - 61 U/L Final    Alkaline Phosphatase 10/17/2023 63  45 - 117 U/L Final    Bilirubin, Total 10/17/2023 2.3 (H)  0.1 - 2.0 mg/dL Final    Total Protein 10/17/2023 7.2  6.4 - 8.2 g/dL Final    Albumin 10/17/2023 4.0  3.4 - 5.0 g/dL Final    Globulin  10/17/2023 3.2  2.8 - 4.4 g/dL Final    A/G Ratio 10/17/2023 1.3  1.0 - 2.0 Final    Patient Fasting for CMP? 10/17/2023 Yes   Final    WBC 10/17/2023 6.6  4.0 - 11.0 x10(3) uL Final    RBC 10/17/2023 4.68  4.30 - 5.70 x10(6)uL Final    HGB 10/17/2023 13.9  13.0 - 17.5 g/dL Final    HCT 10/17/2023 40.7  39.0 - 53.0 % Final    PLT 10/17/2023 333.0  150.0 - 450.0 10(3)uL Final    MCV 10/17/2023 87.0  80.0 - 100.0 fL Final    MCH 10/17/2023 29.7  26.0 - 34.0 pg Final    MCHC 10/17/2023 34.2  31.0 - 37.0 g/dL Final    RDW 10/17/2023 12.2  % Final       ASSESSMENT   Imp: Status post fistulotomy with seton placement and new shallow anterior midline anal fistula no evidence of abscess formation on digital examination.  PLan: Keflex 500 twice daily x 10 days follow-up in the office in 2 weeks sitz bath's.  Return if symptoms worsen      Meds & Refills for this Visit:  Requested Prescriptions     Signed Prescriptions Disp Refills    cephalexin (KEFLEX) 500 MG Oral Cap 20 capsule 0     Sig:  Take 1 capsule (500 mg total) by mouth 2 (two) times daily for 10 days.       Imaging & Consults:  None

## 2024-06-13 ENCOUNTER — OFFICE VISIT (OUTPATIENT)
Dept: SURGERY | Facility: CLINIC | Age: 64
End: 2024-06-13

## 2024-06-13 VITALS — TEMPERATURE: 98 F

## 2024-06-13 DIAGNOSIS — K60.3 ANAL FISTULA: Primary | ICD-10-CM

## 2024-06-13 PROCEDURE — 99214 OFFICE O/P EST MOD 30 MIN: CPT | Performed by: SURGERY

## 2024-06-14 ENCOUNTER — TELEPHONE (OUTPATIENT)
Facility: LOCATION | Age: 64
End: 2024-06-14

## 2024-06-14 NOTE — TELEPHONE ENCOUNTER
Transaction ID: [id number] ID: [id number] Date: 2024-06-14  LUIS ALBERTO JONES Patient  Member ID  [id number]    Date of Birth  1960-02-23    Gender  Male    Transaction Type  Outpatient Authorization    Organization  [facility]    Payer  [facility] logo     Certificate Information  Reference Number  [id number]    Status  NO ACTION REQUIRED    Message  Requested Service does not require preauthorization. We would strongly encourage you to check benefits for this service.    Member Information  Patient Name  LUIS ALBERTO JONES    Patient Date of Birth  1960-02-23    Patient Gender  Male    Member ID  [id number]    Relationship to Subscriber  Self    Subscriber Name  LUIS ALBERTO JONES    Requesting Provider     Name  SEMAJ SALCEDO    NPI  [id number]    Tax Id  [id number]    Specialty  [id number]  Provider Role  Provider    Address  [address] [place] [address]    Phone  [phone]  Fax  [phone]    Contact Name  DEVANG CORADO    Service Information  Service Type  2 - Surgical    Place of Service  22 - [facility]    Service From - To Date  2024-06-24 - 2024-09-24    Level of Service  Elective    Diagnosis Code 1  K603 - Anal fistula    Procedure Code 1 (CPT/HCPCS)  25565 - SURG DX EXAM ANORECTAL    Quantity  1 Units    Status  NO ACTION REQUIRED    Procedure Code 2 (CPT/HCPCS)  70423 - REMOVE ANAL FIST 2 STAGE    Quantity  1 Units    Status  NO ACTION REQUIRED    Rendering Provider/Facility     Provider 1  Name  SEMAJ SALCEDO    NPI  [id number]    Specialty  [id number]  Provider Role  Attending    Address  [address] [place] [address]    Provider 2  Name  [facility]    NPI  [id number]    Provider Role  Facility    Address  [address] [place] [address]      [phone]

## 2024-06-23 ENCOUNTER — ANESTHESIA EVENT (OUTPATIENT)
Dept: SURGERY | Facility: HOSPITAL | Age: 64
End: 2024-06-23

## 2024-06-24 ENCOUNTER — HOSPITAL ENCOUNTER (OUTPATIENT)
Facility: HOSPITAL | Age: 64
Setting detail: HOSPITAL OUTPATIENT SURGERY
Discharge: HOME | End: 2024-06-24
Attending: SURGERY | Admitting: SURGERY

## 2024-06-24 ENCOUNTER — ANESTHESIA (OUTPATIENT)
Dept: SURGERY | Facility: HOSPITAL | Age: 64
End: 2024-06-24

## 2024-06-24 VITALS
RESPIRATION RATE: 16 BRPM | OXYGEN SATURATION: 100 % | HEART RATE: 70 BPM | DIASTOLIC BLOOD PRESSURE: 87 MMHG | HEIGHT: 72 IN | TEMPERATURE: 98 F | BODY MASS INDEX: 28.04 KG/M2 | WEIGHT: 207 LBS | SYSTOLIC BLOOD PRESSURE: 142 MMHG

## 2024-06-24 PROCEDURE — 0H89XZZ DIVISION OF PERINEUM SKIN, EXTERNAL APPROACH: ICD-10-PCS | Performed by: SURGERY

## 2024-06-24 RX ORDER — ONDANSETRON 2 MG/ML
INJECTION INTRAMUSCULAR; INTRAVENOUS AS NEEDED
Status: DISCONTINUED | OUTPATIENT
Start: 2024-06-24 | End: 2024-06-24 | Stop reason: SURG

## 2024-06-24 RX ORDER — MEPERIDINE HYDROCHLORIDE 25 MG/ML
12.5 INJECTION INTRAMUSCULAR; INTRAVENOUS; SUBCUTANEOUS AS NEEDED
Status: DISCONTINUED | OUTPATIENT
Start: 2024-06-24 | End: 2024-06-24

## 2024-06-24 RX ORDER — BUPIVACAINE HYDROCHLORIDE 2.5 MG/ML
INJECTION, SOLUTION EPIDURAL; INFILTRATION; INTRACAUDAL AS NEEDED
Status: DISCONTINUED | OUTPATIENT
Start: 2024-06-24 | End: 2024-06-24 | Stop reason: HOSPADM

## 2024-06-24 RX ORDER — LIDOCAINE HYDROCHLORIDE 10 MG/ML
INJECTION, SOLUTION EPIDURAL; INFILTRATION; INTRACAUDAL; PERINEURAL AS NEEDED
Status: DISCONTINUED | OUTPATIENT
Start: 2024-06-24 | End: 2024-06-24 | Stop reason: SURG

## 2024-06-24 RX ORDER — KETAMINE HYDROCHLORIDE 50 MG/ML
INJECTION, SOLUTION INTRAMUSCULAR; INTRAVENOUS AS NEEDED
Status: DISCONTINUED | OUTPATIENT
Start: 2024-06-24 | End: 2024-06-24 | Stop reason: SURG

## 2024-06-24 RX ORDER — HYDROMORPHONE HYDROCHLORIDE 1 MG/ML
0.6 INJECTION, SOLUTION INTRAMUSCULAR; INTRAVENOUS; SUBCUTANEOUS EVERY 5 MIN PRN
Status: DISCONTINUED | OUTPATIENT
Start: 2024-06-24 | End: 2024-06-24

## 2024-06-24 RX ORDER — OXYCODONE HYDROCHLORIDE 5 MG/1
10 TABLET ORAL EVERY 4 HOURS PRN
Status: DISCONTINUED | OUTPATIENT
Start: 2024-06-24 | End: 2024-06-24

## 2024-06-24 RX ORDER — MIDAZOLAM HYDROCHLORIDE 1 MG/ML
1 INJECTION INTRAMUSCULAR; INTRAVENOUS EVERY 5 MIN PRN
Status: DISCONTINUED | OUTPATIENT
Start: 2024-06-24 | End: 2024-06-24

## 2024-06-24 RX ORDER — SODIUM CHLORIDE, SODIUM LACTATE, POTASSIUM CHLORIDE, CALCIUM CHLORIDE 600; 310; 30; 20 MG/100ML; MG/100ML; MG/100ML; MG/100ML
INJECTION, SOLUTION INTRAVENOUS CONTINUOUS
Status: DISCONTINUED | OUTPATIENT
Start: 2024-06-24 | End: 2024-06-24

## 2024-06-24 RX ORDER — HYDROMORPHONE HYDROCHLORIDE 1 MG/ML
0.2 INJECTION, SOLUTION INTRAMUSCULAR; INTRAVENOUS; SUBCUTANEOUS EVERY 5 MIN PRN
Status: DISCONTINUED | OUTPATIENT
Start: 2024-06-24 | End: 2024-06-24

## 2024-06-24 RX ORDER — ACETAMINOPHEN 500 MG
1000 TABLET ORAL ONCE
Status: DISCONTINUED | OUTPATIENT
Start: 2024-06-24 | End: 2024-06-24 | Stop reason: HOSPADM

## 2024-06-24 RX ORDER — ROCURONIUM BROMIDE 10 MG/ML
INJECTION, SOLUTION INTRAVENOUS AS NEEDED
Status: DISCONTINUED | OUTPATIENT
Start: 2024-06-24 | End: 2024-06-24 | Stop reason: SURG

## 2024-06-24 RX ORDER — HEPARIN SODIUM 5000 [USP'U]/ML
5000 INJECTION, SOLUTION INTRAVENOUS; SUBCUTANEOUS ONCE
Status: COMPLETED | OUTPATIENT
Start: 2024-06-24 | End: 2024-06-24

## 2024-06-24 RX ORDER — DEXAMETHASONE SODIUM PHOSPHATE 4 MG/ML
VIAL (ML) INJECTION AS NEEDED
Status: DISCONTINUED | OUTPATIENT
Start: 2024-06-24 | End: 2024-06-24 | Stop reason: SURG

## 2024-06-24 RX ORDER — OXYCODONE HYDROCHLORIDE 5 MG/1
5 TABLET ORAL EVERY 4 HOURS PRN
Status: DISCONTINUED | OUTPATIENT
Start: 2024-06-24 | End: 2024-06-24

## 2024-06-24 RX ORDER — DEXTROSE MONOHYDRATE 25 G/50ML
50 INJECTION, SOLUTION INTRAVENOUS
Status: DISCONTINUED | OUTPATIENT
Start: 2024-06-24 | End: 2024-06-24

## 2024-06-24 RX ORDER — NICOTINE POLACRILEX 4 MG
15 LOZENGE BUCCAL
Status: DISCONTINUED | OUTPATIENT
Start: 2024-06-24 | End: 2024-06-24

## 2024-06-24 RX ORDER — HYDROMORPHONE HYDROCHLORIDE 1 MG/ML
0.4 INJECTION, SOLUTION INTRAMUSCULAR; INTRAVENOUS; SUBCUTANEOUS EVERY 5 MIN PRN
Status: DISCONTINUED | OUTPATIENT
Start: 2024-06-24 | End: 2024-06-24

## 2024-06-24 RX ORDER — KETOROLAC TROMETHAMINE 30 MG/ML
INJECTION, SOLUTION INTRAMUSCULAR; INTRAVENOUS AS NEEDED
Status: DISCONTINUED | OUTPATIENT
Start: 2024-06-24 | End: 2024-06-24 | Stop reason: SURG

## 2024-06-24 RX ORDER — ONDANSETRON 2 MG/ML
4 INJECTION INTRAMUSCULAR; INTRAVENOUS EVERY 6 HOURS PRN
Status: DISCONTINUED | OUTPATIENT
Start: 2024-06-24 | End: 2024-06-24

## 2024-06-24 RX ORDER — GLYCOPYRROLATE 0.2 MG/ML
INJECTION, SOLUTION INTRAMUSCULAR; INTRAVENOUS AS NEEDED
Status: DISCONTINUED | OUTPATIENT
Start: 2024-06-24 | End: 2024-06-24 | Stop reason: SURG

## 2024-06-24 RX ORDER — OXYCODONE HYDROCHLORIDE 5 MG/1
15 TABLET ORAL EVERY 4 HOURS PRN
Status: DISCONTINUED | OUTPATIENT
Start: 2024-06-24 | End: 2024-06-24

## 2024-06-24 RX ORDER — DIBUCAINE 0.28 G/28G
OINTMENT TOPICAL AS NEEDED
Status: DISCONTINUED | OUTPATIENT
Start: 2024-06-24 | End: 2024-06-24 | Stop reason: HOSPADM

## 2024-06-24 RX ORDER — METRONIDAZOLE 500 MG/100ML
500 INJECTION, SOLUTION INTRAVENOUS ONCE
Status: COMPLETED | OUTPATIENT
Start: 2024-06-24 | End: 2024-06-24

## 2024-06-24 RX ORDER — NICOTINE POLACRILEX 4 MG
30 LOZENGE BUCCAL
Status: DISCONTINUED | OUTPATIENT
Start: 2024-06-24 | End: 2024-06-24

## 2024-06-24 RX ORDER — SCOLOPAMINE TRANSDERMAL SYSTEM 1 MG/1
1 PATCH, EXTENDED RELEASE TRANSDERMAL ONCE
Status: DISCONTINUED | OUTPATIENT
Start: 2024-06-24 | End: 2024-06-24 | Stop reason: HOSPADM

## 2024-06-24 RX ORDER — PROCHLORPERAZINE EDISYLATE 5 MG/ML
5 INJECTION INTRAMUSCULAR; INTRAVENOUS EVERY 8 HOURS PRN
Status: DISCONTINUED | OUTPATIENT
Start: 2024-06-24 | End: 2024-06-24

## 2024-06-24 RX ORDER — NALOXONE HYDROCHLORIDE 0.4 MG/ML
0.08 INJECTION, SOLUTION INTRAMUSCULAR; INTRAVENOUS; SUBCUTANEOUS AS NEEDED
Status: DISCONTINUED | OUTPATIENT
Start: 2024-06-24 | End: 2024-06-24

## 2024-06-24 RX ORDER — NEOSTIGMINE METHYLSULFATE 1 MG/ML
INJECTION, SOLUTION INTRAVENOUS AS NEEDED
Status: DISCONTINUED | OUTPATIENT
Start: 2024-06-24 | End: 2024-06-24 | Stop reason: SURG

## 2024-06-24 RX ADMIN — DEXAMETHASONE SODIUM PHOSPHATE 4 MG: 4 MG/ML VIAL (ML) INJECTION at 14:26:00

## 2024-06-24 RX ADMIN — ROCURONIUM BROMIDE 30 MG: 10 INJECTION, SOLUTION INTRAVENOUS at 14:26:00

## 2024-06-24 RX ADMIN — KETAMINE HYDROCHLORIDE 15 MG: 50 INJECTION, SOLUTION INTRAMUSCULAR; INTRAVENOUS at 14:21:00

## 2024-06-24 RX ADMIN — SODIUM CHLORIDE, SODIUM LACTATE, POTASSIUM CHLORIDE, CALCIUM CHLORIDE: 600; 310; 30; 20 INJECTION, SOLUTION INTRAVENOUS at 14:11:00

## 2024-06-24 RX ADMIN — NEOSTIGMINE METHYLSULFATE 5 MG: 1 INJECTION, SOLUTION INTRAVENOUS at 14:59:00

## 2024-06-24 RX ADMIN — GLYCOPYRROLATE 0.4 MG: 0.2 INJECTION, SOLUTION INTRAMUSCULAR; INTRAVENOUS at 14:59:00

## 2024-06-24 RX ADMIN — ROCURONIUM BROMIDE 10 MG: 10 INJECTION, SOLUTION INTRAVENOUS at 14:15:00

## 2024-06-24 RX ADMIN — LIDOCAINE HYDROCHLORIDE 50 MG: 10 INJECTION, SOLUTION EPIDURAL; INFILTRATION; INTRACAUDAL; PERINEURAL at 14:15:00

## 2024-06-24 RX ADMIN — METRONIDAZOLE 500 MG: 500 INJECTION, SOLUTION INTRAVENOUS at 14:23:00

## 2024-06-24 RX ADMIN — ONDANSETRON 4 MG: 2 INJECTION INTRAMUSCULAR; INTRAVENOUS at 14:43:00

## 2024-06-24 RX ADMIN — KETOROLAC TROMETHAMINE 30 MG: 30 INJECTION, SOLUTION INTRAMUSCULAR; INTRAVENOUS at 14:55:00

## 2024-06-24 NOTE — BRIEF OP NOTE
Pre-Operative Diagnosis: Anal fistula [K60.3]     Post-Operative Diagnosis: Anal fistula [K60.3]      Procedure Performed:   ANAL EXAMINATION UNDER ANESTHESIA, COMPLETION FISTULOTOMY    Surgeons and Role:     * Brock Gillespie DO - Primary    Assistant(s):        Surgical Findings:      Specimen:      Estimated Blood Loss: No data recorded    Dictation Number:      Brock Gillespie DO  6/24/2024  2:52 PM

## 2024-06-24 NOTE — DISCHARGE INSTRUCTIONS
Home Care Instructions  Anorectal Surgery  Dr. Gillespie    MEDICATIONS  For post-operative pain control the medications are usually Norco or Tylenol #3.  These are narcotics and are best taken by starting with one tablet and repeating every four to six hours as needed.  If the patient does not feel they need the narcotics they shouldn’t take them.  If the pain is severe the patient may take up to two pills every four hours.  If a minor supplement is necessary in addition to the narcotics do not overlap with Tylenol (any product with acetaminophen) as both Norco and Tylenol #3 contain Tylenol.  Please supplement with ibuprofen (Aleve or Motrin).  Please ask Dr. Gillespie before resuming blood thinners such as aspirin, Plavix or Coumadin.  All other home medications may be resumed as scheduled.  It is advisable on the day of surgery to take two tablespoons of Milk of Magnesia twice on this day only.  Repeat only if passing hard stool or if there is no bowel movement within 36 hours of surgery.    DIET  The patient must resume a high fiber diet immediately.  This is not a good time to get constipated.  If the patient were to pass a hard stool the operative site could be damaged.  See the high fiber diet instruction sheet.  Also, be sure to take Metamucil, Fibercon, Citrucel, Konsyl, Benefiber or another bulk laxative, using the higher recommended daily dose.  There should be no alcohol consumption in the immediate recovery time period or within six hours of taking narcotics.    WOUND CARE  The patient may shower the day of discharge.  The wounds can be washed with soap and water and should be thoroughly cleaned in this manner at least once a day.  No hair dye or chemicals of any kind should get in the wounds.  In addition to showering, starting the day after surgery the patient can start tub baths with hot water.  Hot water, as hot as can be withstood without burning the skin, can be very soothing.  Again, start this on the  day after surgery.  Also, the day after surgery, the patient must start with Sitz baths three times a day.  A Sitz bath is a contraption bought at the pharmacy that enables a patient to get water on the operative site while sitting on a toilet.  There is nothing magical about this device and squirt bottles, tub baths and showers can be substituted for a Sitz bath device.  The minimum daily use is three times a day.  It is recommended to get water on the operative site upon awakening, after bowel movements and prior to bed.  Dry thoroughly with a clean hand towel or dry wash rag.    ACTIVITY  Every day the patient should be up, showered and dressed.  Each day the patient should be up and around the house.  The patient should not lie in bed and should not stay in pajamas.  We count on the patient being up, coughing, walking and deep breathing to avoid pneumonia and blood clots in the legs.  Once a day the patient should get out of the house and go shopping, go to the mall, the [employer] store, the [employer] or a restaurant.  The patient may ride in a car but should not drive the car for at least 48 hours.  Patients should be off narcotics for at least 8 hours prior to being the .  The average time off work is three to five days.  Patients may go up and down stairs and lift up to ten pounds but no bending, pushing or pulling for the first 10 days.  Patients should seek further activity limits at the time of their appointment.    APPOINTMENT  Please call our office today for an appointment three weeks after surgery.  The reason to wait three weeks is because the area is checked by digital rectal exam.  It is at the three week norman that most patients can withstand the post-operative examination.  Any fever greater than 100.5, chills, nausea, vomiting, or severe diarrhea please call our office at [phone].  If the patient is unable to urinate and has a full and painful bladder call us immediately.  For life  threatening emergencies call 911.  For non-emergent care please call our office after 8:30 a.m. Monday through Friday.  The number listed above is our office number, our phone automatically switches to our answering service if we are not there.  Please do not use the emergency room for non-urgent care.    Thank you for entrusting us with your care.  [facility]--General Surgery

## 2024-06-24 NOTE — ANESTHESIA PREPROCEDURE EVALUATION
PRE-OP EVALUATION    Patient Name: Seven Lake    Admit Diagnosis: Anal fistula [K60.3]    Pre-op Diagnosis: Anal fistula [K60.3]    ANAL EXAMINATION UNDER ANESTHESIA, COMPLETION FISTULOTOMY, POSSIBLE SETON PLACEMENT    Anesthesia Procedure: ANAL EXAMINATION UNDER ANESTHESIA, COMPLETION FISTULOTOMY, POSSIBLE SETON PLACEMENT    Surgeons and Role:     * Brock Gillespie, DO - Primary    Pre-op vitals reviewed.  Temp: 98.4 °F (36.9 °C)  Pulse: 80  Resp: 16  BP: 130/84  SpO2: 98 %  Body mass index is 28.07 kg/m².    Current medications reviewed.  Hospital Medications:   [Transfer Hold] acetaminophen (Tylenol Extra Strength) tab 1,000 mg  1,000 mg Oral Once    [Transfer Hold] scopolamine (Transderm-Scop) 1 MG/3DAYS patch 1 patch  1 patch Transdermal Once    lactated ringers infusion   Intravenous Continuous    [COMPLETED] heparin (Porcine) 5000 UNIT/ML injection 5,000 Units  5,000 Units Subcutaneous Once    ceFAZolin (Ancef) 2g in 10mL IV syringe premix  2 g Intravenous Once    And    metRONIDAZOLE in sodium chloride 0.79% (Flagyl) 5 mg/mL IVPB premix 500 mg  500 mg Intravenous Once       Outpatient Medications:     Medications Prior to Admission   Medication Sig Dispense Refill Last Dose    diclofenac 75 MG Oral Tab EC Take 1 tablet (75 mg total) by mouth as needed.   6/17/2024    Multiple Vitamins-Minerals (MULTIVITAL-M) Oral Tab Take by mouth.   6/10/2024    Ascorbic Acid (VITAMIN C) 1000 MG Oral Tab Take 1 tablet (1,000 mg total) by mouth daily.   6/23/2024    ustekinumab (STELARA) 90 MG/ML Subcutaneous Solution Prefilled Syringe injection Inject 1 syringe into the skin every 12 weeks 1 mL 1 More than a month       Allergies: Patient has no known allergies.      Anesthesia Evaluation    Patient summary reviewed.    Anesthetic Complications  (-) history of anesthetic complications         GI/Hepatic/Renal      (-) GERD                           Cardiovascular        Exercise tolerance: good     MET:  >4    (-) obesity  (+) hypertension     (-) CAD                  (-) angina     (-) WOOTEN         Endo/Other      (-) diabetes                            Pulmonary      (-) asthma  (-) COPD       (-) shortness of breath     (-) sleep apnea       Neuro/Psych                              Patient Active Problem List:     Other psoriasis     Primary osteoarthritis of left knee     Primary osteoarthritis of right knee     AC separation, type 3, right, initial encounter     Anal abscess     Anal fistula     Post-operative state     Pruritus ani     Nonhealing surgical wound, sequela            Past Surgical History:   Procedure Laterality Date    Colonoscopy N/A 12/14/2018    Procedure: COLONOSCOPY;  Surgeon: Brock Gillespie DO;  Location:  ENDOSCOPY    Other surgical history      rectal 7/23 10/23    Other surgical history  05/06/2024    ANAL EXAMINATION UNDER ANESTHESIA, FISTULOTOMY, SETON PLACEMENT     Social History     Socioeconomic History    Marital status:    Tobacco Use    Smoking status: Never    Smokeless tobacco: Never    Tobacco comments:     non-smoker   Vaping Use    Vaping status: Never Used   Substance and Sexual Activity    Alcohol use: No     Alcohol/week: 0.0 standard drinks of alcohol    Drug use: No     History   Drug Use No     Available pre-op labs reviewed.               Airway      Mallampati: I  Mouth opening: >3 FB  TM distance: 4 - 6 cm  Neck ROM: full Cardiovascular    Cardiovascular exam normal.  Rhythm: regular  Rate: normal     Dental  Comment: Patient denies any loose/missing/cracked teeth. No gross abnormalities or loose teeth noted on exam.      Dentition appears grossly intact         Pulmonary    Pulmonary exam normal.                 Other findings              ASA: 2   Plan: general  NPO status verified and patient meets guidelines.    Post-procedure pain management plan discussed with surgeon and patient.    Comment:     A detailed discussion about the anesthetic plan was  held with Seven Lake in the preoperative area. Discussed benefits and risks of general anesthesia including, reasonable expectations of post-operative pain, nausea, vomiting, injury to lips, gums, tongue, teeth, eyes, awareness under anesthesia, cardiac, pulmonary, aspiration, stroke, and death. All questions were answered appropriately and patient demonstrated understanding of realistic expectations and risks of undergoing anesthesia. Seven Lake consents to receiving anesthesia and wishes to proceed.      Plan/risks discussed with: patient                Present on Admission:  **None**

## 2024-06-24 NOTE — ANESTHESIA PROCEDURE NOTES
Airway  Date/Time: 6/24/2024 2:17 PM  Urgency: elective      General Information and Staff    Patient location during procedure: OR  Anesthesiologist: Finn Parisi MD  Resident/CRNA: Emely Lozano CRNA  Performed: CRNA   Performed by: Emely Lozano CRNA  Authorized by: Finn Parisi MD      Indications and Patient Condition  Indications for airway management: anesthesia  Sedation level: deep  Preoxygenated: yes  Patient position: sniffing  Mask difficulty assessment: 1 - vent by mask    Final Airway Details  Final airway type: endotracheal airway      Successful airway: ETT  Cuffed: yes   Successful intubation technique: Video laryngoscopy  Facilitating devices/methods: intubating stylet  Endotracheal tube insertion site: oral  Blade: GlideScope  Blade size: #3  ETT size (mm): 7.5    Cormack-Lehane Classification: grade I - full view of glottis  Placement verified by: capnometry   Measured from: lips  ETT to lips (cm): 22  Number of attempts at approach: 1

## 2024-06-24 NOTE — PROGRESS NOTES
[facility]  Progress Note    Seven Lake Patient Status:  No patient class for patient encounter    1960 MRN [id number]   Location [facility], [facility] Attending No att. providers found   Hosp Day # 0 PCP Loreto Leon MD     Subjective:  Patient complained of perianal pain.  Patient states he was having no pain after surgery and now in the past few days has been developing increasing pain.  With drainage.  Denies fever or chills states he has continuing with his normal physical activities including working out and going to work every day  Objective/Physical Exam:    [unfilled]    General: Alert, orientated x3.  Cooperative.  No apparent distress.  Vital Signs:  Temperature 98 °F (36.7 °C), temperature source Temporal.  HEENT: Exam is unremarkable.  Without scleral icterus.  Mucous membranes are moist. Oropharynx is clear.  Neck: No tenderness to palpitation.  No JVD. Supple.   Lungs: Clear to auscultation bilaterally.  Cardiac: Regular rate and rhythm. No murmur.  Abdomen:  Soft, non-distended, non-tender, with no rebound or guarding.  No peritoneal signs. No ascites.  Liver is within normal limits.  Spleen is not palpable   Extremities:  No lower extremity edema noted.  Without clubbing or cyanosis.    [facility] evidence of inflammatory tissue in the perianal area with a thin very shallow anterior midline fistula.    Labs:             Assessment/Plan:  Patient Active Problem List   Diagnosis    Other psoriasis    Primary osteoarthritis of left knee    Primary osteoarthritis of right knee    AC separation, type 3, right, initial encounter    Anal abscess    Anal fistula    Post-operative state    Pruritus ani    Nonhealing surgical wound, sequela       Impression: Patient with history of anal fistulotomy with inflammatory process no discrete abscess will start oral antibiotics now.  Follow-up in office in 2  weeks  I spent  33  minutes face to face with the patient. More than 50% of that time was spent counseling the patient and/or on coordination of care. The diagnosis, prognosis, and general treatment was explained to the patient and the family.        Brock Gillespie DO  [facility] General Surgery  6/24/2024  10:58 AM

## 2024-06-24 NOTE — ANESTHESIA POSTPROCEDURE EVALUATION
[facility]    Seven Lake Patient Status:  Hospital Outpatient Surgery   Age/Gender 64 year old male MRN [id number]   Location [facility] SURGERY Attending Brock Gillespie DO   Hosp Day # 0 PCP Loreto Leon MD       Anesthesia Post-op Note    ANAL EXAMINATION UNDER ANESTHESIA, COMPLETION FISTULOTOMY    Procedure Summary       Date: 06/24/24 Room / Location:  MAIN OR 05 / [facility] MAIN OR    Anesthesia Start: 1410 Anesthesia Stop: 1505    Procedure: ANAL EXAMINATION UNDER ANESTHESIA, COMPLETION FISTULOTOMY (Anus) Diagnosis:       Anal fistula      (Anal fistula [K60.3])    Surgeons: Brock Gillespie DO Anesthesiologist: Finn Parisi MD    Anesthesia Type: general ASA Status: 2            Anesthesia Type: No value filed.    Vitals Value Taken Time   /72 06/24/24 1509   Temp 98.4 06/24/24 1509   Pulse 71 06/24/24 1509   Resp 12 06/24/24 1509   SpO2 98 06/24/24 1509       Patient Location: PACU    Anesthesia Type: general    Airway Patency: patent    Postop Pain Control: adequate    Mental Status: preanesthetic baseline    Nausea/Vomiting: none    Cardiopulmonary/Hydration status: stable euvolemic    Complications: no apparent anesthesia related complications    Postop vital signs: stable    Dental Exam: Unchanged from Preop    Patient to be discharged from PACU when criteria met.

## 2024-06-25 NOTE — OPERATIVE REPORT
[facility]    PATIENT'S NAME: LUIS ALBERTO JONES   ATTENDING PHYSICIAN: Brock Gillespie D.O.   OPERATING PHYSICIAN: Brock Gillespie D.O.   PATIENT ACCOUNT#:   [id number]    LOCATION:  [facility] 5 EDW 10  MEDICAL RECORD #:   [id number]       YOB: 1960  ADMISSION DATE:       06/24/2024      OPERATION DATE:  06/24/2024    OPERATIVE REPORT    PREOPERATIVE DIAGNOSIS:  Chronic anal fistula with seton.  POSTOPERATIVE DIAGNOSIS:  Chronic anal fistula with seton.  PROCEDURE:  Removal of seton with fulguration of seton bed and anal examination under anesthesia.    COMPLICATIONS:  None.    OPERATIVE TECHNIQUE:  An informed consent was previously obtained.  The patient was taken to the operative suite and placed in the supine position.  General inhalational anesthetic was administered by the anesthesia department, and the patient was placed in prone jackknife position with buttocks taped widely apart.  Anoscopy was carried out demonstrating a seton at the left lateral position.  Careful inspection was carried out demonstrating perhaps 8 mm of remaining anal sphincter tissue enclosed within the loop of the seton.    Decision was made to transect this remaining small portion of internal anal sphincter.  This was divided using handheld electrocautery, and the seton was removed intact with the loop intact.    Fulguration was carried out within the bed of the seton.  Copious irrigation was carried out.  There was no evidence of bleeding.  Then, 20 mL of 0.5% Marcaine plain was injected into the surrounding perianal tissues.  Dibucaine and gauze were applied.  The patient was transported from the operative suite to Recovery in stable condition.    Dictated By Brock Gillespie D.O.  d: 06/24/2024 14:55:01  t: 06/24/2024 21:06:57  Job [id number]  /

## 2024-07-16 ENCOUNTER — OFFICE VISIT (OUTPATIENT)
Facility: LOCATION | Age: 64
End: 2024-07-16
Payer: COMMERCIAL

## 2024-07-16 VITALS — HEART RATE: 75 BPM | TEMPERATURE: 98 F

## 2024-07-16 DIAGNOSIS — Z98.890 POSTOPERATIVE STATE: Primary | ICD-10-CM

## 2024-07-16 DIAGNOSIS — K60.3 ANAL FISTULA: ICD-10-CM

## 2024-07-16 DIAGNOSIS — K61.0 PERIANAL ABSCESS: ICD-10-CM

## 2024-07-16 PROCEDURE — 99024 POSTOP FOLLOW-UP VISIT: CPT

## 2024-07-16 NOTE — PROGRESS NOTES
Follow Up Visit Note       Active Problems      1. Postoperative state    2. Anal fistula    3. Perianal abscess          Chief Complaint   Chief Complaint   Patient presents with    Post-Op     PO 6/24 ANAL EXAMINATION UNDER ANESTHESIA, COMPLETION FISTULOTOMY w/Jose Miguel- pt reports a blood on the gauze this morning, denies pain           History of Present Illness  Seven is a 64 year old male who underwent anal examination under anesthesia, completion fistulotomy with Dr. Gillespie on 6/24/2024. He presents to clinic today for follow up evaluation     He reports doing well overall postoperatively. He denies pain to the site. He reports less drainage compared to prior surgery. He reports he did note a small amount of bright red blood on gauze this morning. He denies fever or chills. He denies abdominal pain, nausea or vomiting. He reports having soft regular bowel movements.    Allergies  Seven has No Known Allergies.    Past Medical / Surgical / Social / Family History    The past medical and past surgical history have been reviewed by me today.    Past Medical History:    Acute bronchitis    Acute bronchospasm    Anxiety state, unspecified    NO LONGER AN ISSUE    Chest pain, unspecified    Essential hypertension, benign    High blood pressure    PT DENIES, NO MEDICATION/TREATMENT    Nonspecific abnormal electrocardiogram (ECG) (EKG)    Osteoarthritis    Other abnormal blood chemistry    Sleep disturbance, unspecified    Visual impairment    CONTACTS GLASSES     Past Surgical History:   Procedure Laterality Date    Colonoscopy N/A 12/14/2018    Procedure: COLONOSCOPY;  Surgeon: Brock Gillespie DO;  Location:  ENDOSCOPY    Other surgical history      rectal 7/23 10/23    Other surgical history  05/06/2024    ANAL EXAMINATION UNDER ANESTHESIA, FISTULOTOMY, SETON PLACEMENT       The family history and social history have been reviewed by me today.    Family History   Problem Relation Age of Onset    Heart  Disease Father     Cancer Other         Grandfather with Lung cancer    Stroke Other         Grandfather      Social History     Socioeconomic History    Marital status:    Tobacco Use    Smoking status: Never    Smokeless tobacco: Never    Tobacco comments:     non-smoker   Vaping Use    Vaping status: Never Used   Substance and Sexual Activity    Alcohol use: No     Alcohol/week: 0.0 standard drinks of alcohol    Drug use: No        Current Outpatient Medications:     diclofenac 75 MG Oral Tab EC, Take 1 tablet (75 mg total) by mouth as needed., Disp: , Rfl:     ustekinumab (STELARA) 90 MG/ML Subcutaneous Solution Prefilled Syringe injection, Inject 1 syringe into the skin every 12 weeks, Disp: 1 mL, Rfl: 1    Multiple Vitamins-Minerals (MULTIVITAL-M) Oral Tab, Take by mouth., Disp: , Rfl:     Ascorbic Acid (VITAMIN C) 1000 MG Oral Tab, Take 1 tablet (1,000 mg total) by mouth daily., Disp: , Rfl:      Review of Systems  The Review of Systems has been reviewed by me during today.  Review of Systems   Constitutional:  Negative for activity change, appetite change, chills, fatigue and fever.   Gastrointestinal:  Negative for abdominal distention, abdominal pain, anal bleeding, blood in stool, constipation, diarrhea, nausea, rectal pain and vomiting.   Genitourinary:  Negative for difficulty urinating, dysuria and urgency.   Skin:  Negative for rash and wound.        Physical Findings   Pulse 75   Temp 97.9 °F (36.6 °C) (Temporal)   Physical Exam  Vitals reviewed. Exam conducted with a chaperone present (SONIA Michaels).   Constitutional:       General: He is not in acute distress.     Appearance: Normal appearance. He is not ill-appearing.   HENT:      Head: Normocephalic and atraumatic.   Eyes:      General: No scleral icterus.     Conjunctiva/sclera: Conjunctivae normal.   Pulmonary:      Effort: Pulmonary effort is normal. No respiratory distress.   Abdominal:      General: Abdomen is flat. There is no  distension.      Palpations: Abdomen is soft.      Tenderness: There is no abdominal tenderness. There is no guarding or rebound.   Genitourinary:     Comments: Clinical exam limited to external exam only. SONIA Nevarez present for exam. Left anterior incision is well-healed. No active bleeding seen on exam. Minimal swelling to the area. Non-tender. Gauze with very minimal serosanguinous drainage.   Skin:     General: Skin is warm and dry.      Coloration: Skin is not jaundiced.      Findings: No erythema or rash.   Neurological:      Mental Status: He is alert.   Psychiatric:         Mood and Affect: Mood normal.         Behavior: Behavior normal.          Assessment   1. Postoperative state    2. Anal fistula    3. Perianal abscess        Plan   The patient is doing well postoperatively.  Continue Diet as tolerated. Encourage high fiber diet  Tylenol and Ibuprofen as needed for pain control.   Continue local wound care, soap and water to the incisions. Can apply dry dressing as need for saturation.  No activity restrictions at this time  I offered the patient a follow up with Dr. Gillespie. The patient would like to hold off given he is healing appropriately. He reports he will schedule an appointment if has any concerns.   All the patient's questions and concerns were addressed. They voiced understanding and are in agreement with the plan     Follow Up  The patient may follow up with Dr. Gillespie as needed.     Luana Briscoe PA-C

## 2024-10-16 ENCOUNTER — TELEPHONE (OUTPATIENT)
Dept: FAMILY MEDICINE CLINIC | Facility: CLINIC | Age: 64
End: 2024-10-16

## 2024-10-16 NOTE — TELEPHONE ENCOUNTER
[facility]    Patient is scheduled for surgery on 1/20/2025 for left knee replacement at [facility] [place]    Pt reminder placed for Dec 1 - pt outreach to schedule pre-op

## 2024-10-31 ENCOUNTER — OFFICE VISIT (OUTPATIENT)
Dept: FAMILY MEDICINE CLINIC | Facility: CLINIC | Age: 64
End: 2024-10-31
Payer: COMMERCIAL

## 2024-10-31 VITALS
DIASTOLIC BLOOD PRESSURE: 80 MMHG | HEIGHT: 72 IN | SYSTOLIC BLOOD PRESSURE: 128 MMHG | WEIGHT: 211 LBS | RESPIRATION RATE: 18 BRPM | OXYGEN SATURATION: 99 % | HEART RATE: 95 BPM | BODY MASS INDEX: 28.58 KG/M2 | TEMPERATURE: 98 F

## 2024-10-31 DIAGNOSIS — R17 ELEVATED BILIRUBIN: Primary | ICD-10-CM

## 2024-10-31 DIAGNOSIS — Z12.5 PROSTATE CANCER SCREENING: ICD-10-CM

## 2024-10-31 DIAGNOSIS — G47.00 INSOMNIA, UNSPECIFIED TYPE: ICD-10-CM

## 2024-10-31 DIAGNOSIS — Z00.00 ANNUAL PHYSICAL EXAM: ICD-10-CM

## 2024-10-31 DIAGNOSIS — Z12.11 SCREENING FOR COLON CANCER: ICD-10-CM

## 2024-10-31 DIAGNOSIS — E78.5 HYPERLIPIDEMIA, UNSPECIFIED HYPERLIPIDEMIA TYPE: ICD-10-CM

## 2024-10-31 RX ORDER — ALPRAZOLAM 0.25 MG/1
0.25 TABLET ORAL 3 TIMES DAILY PRN
Qty: 15 TABLET | Refills: 0 | Status: SHIPPED | OUTPATIENT
Start: 2024-10-31

## 2024-10-31 NOTE — PROGRESS NOTES
Seven Lake is a 64 year old male who presents for a complete physical exam.   HPI:   No concerns today.  Exericse work out every day 40mins  Diet- healthy.   He got labs done at work reviewed.   Psa 2.7 normal.  Sleep issue- he was put on sleep meds trazodone but stop it.  He feels his mild is racing at night. He has some anxiety also over weekend thinking about things to do.  He is getting knee replacement. Dr. Lombardi. Left knee in jan.   He will schedule colonscopy next yr with Dr. Gillespie.   Muscle cramp on and off with work out. Recommend mag  Heart scan information given.  Discuss to avoid diclofenac since creatinine mild elevated.           Immunization History   Administered Date(s) Administered    Covid-19 Vaccine Pfizer 30 mcg/0.3 ml 07/26/2021, 08/16/2021    TDAP 06/17/2014    Tb Intradermal Test 05/19/2010     Wt Readings from Last 6 Encounters:   10/31/24 211 lb (95.7 kg)   06/24/24 207 lb (93.9 kg)   05/06/24 208 lb 5.4 oz (94.5 kg)   01/25/24 196 lb (88.9 kg)   10/18/23 201 lb (91.2 kg)   09/19/23 200 lb (90.7 kg)     Body mass index is 28.62 kg/m².     Lab Results   Component Value Date    GLU 76 10/17/2023    GLU 87 07/22/2022     (H) 09/24/2021    GLUCOSE 98 05/28/2015    GLUCOSE 101 (H) 08/19/2014    GLUCOSE 126 (H) 08/14/2013     Lab Results   Component Value Date    CHOLEST 172 10/17/2023    CHOLEST 198 09/24/2021    CHOLEST 168 06/25/2020     Lab Results   Component Value Date    HDL 41 10/17/2023    HDL 40 09/24/2021    HDL 33 (L) 06/25/2020     Lab Results   Component Value Date     (H) 10/17/2023     (H) 09/24/2021     (H) 06/25/2020     Lab Results   Component Value Date    AST 20 10/17/2023    AST 20 09/24/2021    AST 26 06/25/2020     Lab Results   Component Value Date    ALT 31 10/17/2023    ALT 32 09/24/2021    ALT 31 06/25/2020     Lab Results   Component Value Date    PSA 0.926 11/26/2013        Current Outpatient Medications   Medication Sig  Dispense Refill    diclofenac 75 MG Oral Tab EC Take 1 tablet (75 mg total) by mouth as needed.      ustekinumab (STELARA) 90 MG/ML Subcutaneous Solution Prefilled Syringe injection Inject 1 syringe into the skin every 12 weeks 1 mL 1    Multiple Vitamins-Minerals (MULTIVITAL-M) Oral Tab Take by mouth.      Ascorbic Acid (VITAMIN C) 1000 MG Oral Tab Take 1 tablet (1,000 mg total) by mouth daily.        Past Medical History:    Acute bronchitis    Acute bronchospasm    Anxiety state, unspecified    NO LONGER AN ISSUE    Chest pain, unspecified    Essential hypertension, benign    High blood pressure    PT DENIES, NO MEDICATION/TREATMENT    Nonspecific abnormal electrocardiogram (ECG) (EKG)    Osteoarthritis    Other abnormal blood chemistry    Sleep disturbance, unspecified    Visual impairment    CONTACTS GLASSES      Past Surgical History:   Procedure Laterality Date    Colonoscopy N/A 12/14/2018    Procedure: COLONOSCOPY;  Surgeon: Brock Gillespie DO;  Location:  ENDOSCOPY    Other surgical history      rectal 7/23 10/23    Other surgical history  05/06/2024    ANAL EXAMINATION UNDER ANESTHESIA, FISTULOTOMY, SETON PLACEMENT      Family History   Problem Relation Age of Onset    Heart Disease Father     Cancer Other         Grandfather with Lung cancer    Stroke Other         Grandfather       Social History:  Social History     Socioeconomic History    Marital status:    Tobacco Use    Smoking status: Never    Smokeless tobacco: Never    Tobacco comments:     non-smoker   Vaping Use    Vaping status: Never Used   Substance and Sexual Activity    Alcohol use: No     Alcohol/week: 0.0 standard drinks of alcohol    Drug use: No     Social Drivers of Health      [facility]    Housing Stability           REVIEW OF SYSTEMS:   GENERAL: feels well   SKIN: denies any unusual skin lesions  EYES:denies blurred vision or double vision  HEENT: denies  nasal congestion, sinus pain or ST  LUNGS: denies shortness of breath or cough with exertion  CARDIOVASCULAR: denies chest pain or palpitations  GI: denies abdominal pain. Denies heartburn. Has regular bowel movements. No blood in stool.  : denies nocturia or changes in stream. No hematuria.  MUSCULOSKELETAL: denies back pain  NEURO: denies headaches or dizziness  PSYCHE: denies depression anxiety +      EXAM:   /80 (BP Location: Left arm, Patient Position: Sitting, Cuff Size: adult)   Pulse 95   Temp 97.8 °F (36.6 °C) (Temporal)   Resp 18   Ht 6' (1.829 m)   Wt 211 lb (95.7 kg)   SpO2 99%   BMI 28.62 kg/m²   Body mass index is 28.62 kg/m².   GENERAL: well nourished,in no apparent distress  SKIN: no rashes  HEENT: ears and throat are clear  EYES:PERRLA, EOMI, normal optic disk,conjunctiva are clear  NECK: supple,no adenopathy,no bruits. No thyromegaly  BREAST: no dominant or suspicious mass  LUNGS: clear to auscultation  CARDIO: RRR without murmur  GI: soft, good BS's,no masses, HSM or tenderness  : two descended testes,no masses,no hernia,no penile lesions  RECTAL: good rectal tone, prostate shows no masses  MUSCULOSKELETAL: back is not tender  EXTREMITIES: no edema  NEURO: cranial nerves are intact,motor and sensory are grossly intact    ASSESSMENT AND PLAN:   Seven Lake is a 64 year old male who presents for a complete physical exam. Pt's weight is Body mass index is 28.62 kg/m²., recommended low fat/carb diet and aerobic exercise 30-45 minutes 5 times weekly.   Fasting BW ordered:   Labs ordered. Scan in chart from work.   Vaccines:  Anxiety xanax only if needed for weekend. It will help with sleep too.   Vaccination-   Decline tdap and flu today  The patient indicates understanding of these issues and agrees to the plan.  The patient is asked to return for CPX in 1 year.

## 2024-10-31 NOTE — PATIENT INSTRUCTIONS
Exercise for a Healthier Heart     Exercise with a friend. When activity is fun, you're more likely to stick with it.   You may wonder how you can improve the health of your heart. If you’re thinking about exercise, you’re on the right track. You don’t need to become an athlete, but you do need a certain amount of brisk exercise to help strengthen your heart. If you have been diagnosed with a heart condition, your doctor may recommend exercise to help stabilize your condition. To help make exercise a habit, choose safe, fun activities.  Be sure to check with your healthcare provider before starting an exercise program.  Why exercise?  Exercising regularly offers many healthy rewards. It can help you do all of the following:  Improve your blood cholesterol level to help prevent further heart trouble  Lower your blood pressure to help prevent a stroke or heart attack  Control diabetes, or reduce your risk of getting this disease  Improve your heart and lung function  Reach and maintain a healthy weight  Make your muscles stronger and more limber so you can stay active  Prevent falls and fractures by slowing the loss of bone mass (osteoporosis)  Manage stress better  Reduce your blood pressure  Improve your sense of self and your body image  Exercise tips  Ease into your routine. Set small goals. Then build on them.  Exercise on most days. Aim for a total of 150 or more minutes of moderate to  vigorous intensity activity each week. Consider 40 minutes, 3 to 4 times a week. For best results, activity should last for 40 minutes on average. It is OK to work up to the 40 minute period over time. Examples of moderate-intensity activity is walking 1 mile in 15 minutes or 30 to 45 minutes of yard work.  Step up your daily activity level. Along with your exercise program, try being more active throughout the day. Walk instead of drive. Do more household tasks or yard work.  Choose one or more activities you enjoy. Walking is  one of the easiest things you can do. You can also try swimming, riding a bike, dancing, or taking an exercise class.  Stop exercising and call your doctor if you:  Have chest pain or feel dizzy or lightheaded  Feel burning, tightness, pressure, or heaviness in your chest, neck, shoulders, back, or arms  Have unusual shortness of breath  Have increased joint or muscle pain  Have palpitations or an irregular heartbeat  Date Last Reviewed: 5/1/2016  © 7337-3804 [employer] [address] [place] [address]. All rights reserved. This information is not intended as a substitute for professional medical care. Always follow your healthcare professional's instructions.           4 Steps for Eating Healthier  Changing the way you eat can improve your health. It can lower your cholesterol and blood pressure, and help you stay at a healthy weight. Your diet doesn’t have to be bland and boring to be healthy. Just watch your calories and follow these steps:    Step 1. Eat fewer unhealthy fats  Choose more fish and lean meats instead of fatty cuts of meat.  Skip butter and lard, and use less margarine.  Pass on foods that have palm, coconut, or hydrogenated oils.  Eat fewer high-fat dairy foods like cheese, ice cream, and whole milk.  Get a heart-healthy cookbook and try some low-fat recipes.     Step 2. Go light on salt  Keep the saltshaker off the table.  Limit high-salt ingredients, such as soy sauce, bouillon, and garlic salt.  Instead of adding salt when cooking, season your food with herbs and flavorings. Try lemon, garlic, and onion, or salt-free herb seasonings.  Limit convenience foods, such as boxed or canned foods and restaurant food.  Read food labels and choose lower-sodium options.     Step 3. Limit sugar  Pause before you add sugars to pancakes, cereal, coffee, or tea. This includes white and brown table sugar, syrup, honey, and molasses. Cut your usual amount by half.  Use non-sugar  sweeteners. Stevia, aspartame, and sucralose can satisfy a sweet tooth without adding calories.  Swap out sugar-filled soda and other drinks. Buy sugar-free or low-calorie beverages. Remember water is always the best choice.  Read labels and choose foods with less added sugar. Keep in mind that dairy foods and foods with fruit will have some natural sugar.  Cut the sugar in recipes by 1/3 to 1/2. Boost the flavor with extracts like almond, vanilla, or orange. Or add spices such as cinnamon or nutmeg.     Step 4. Eat more fiber  Eat fresh fruits and vegetables every day.  Boost your diet with whole grains. Go for oats, whole-grain rice, and bran.  Add beans and lentils to your meals.  Drink more water to match your fiber increase to help prevent constipation.     Date Last Reviewed: 6/1/2017  © 9924-5165 [employer]. [address] [place] [address]. All rights reserved. This information is not intended as a substitute for professional medical care. Always follow your healthcare professional's instructions.           Understanding Coronary Calcium Scan   A coronary calcium scan is a test that looks at the arteries that supply blood to the heart muscle. These are called the coronary arteries. The scan checks for calcified plaque buildup along the walls of these arteries. Plaque can be made up of calcium, fat, cholesterol, and other substances. A buildup of plaque narrows the arteries. This affects the flow of blood to the heart muscle. If a coronary artery becomes completely blocked, a heart attack (death of part of the heart muscle) can occur. Knowing how much plaque you have in your arteries can help figure out your risk for a heart attack.   Why do I need a coronary calcium scan?   A coronary calcium scan measures the amount of calcium in the arteries. The presence of calcium deposits in an artery means that plaque is starting to build up. The results of the test are given as a calcium score.  The scan can help predict your risk of having a heart attack, even before symptoms appear.   This scan isn’t for everyone. It's most useful when considered along with other risk factors for a heart attack. These include family history of heart disease, high blood pressure, and unhealthy cholesterol.   What happens during a coronary calcium scan?   The scan is done using computed tomography (CT). This test uses X-rays and computers to create detailed images of the heart.     For the test, you lie on a platform that slides into a tube. During the scan:   You will need to stay very still.  You may be asked to hold your breath for short periods of time.  You may have small, sticky discs attached to wires (electrodes) on your chest to record your heartbeat.  The test will likely take about 10 minutes. Once the test is done and your appointment is finished, you can go back to your normal routine.   What are the risks of coronary calcium scan?   A CT scan exposes you to a certain amount of radiation. The benefits of the scan likely outweigh the risks for you. You and your healthcare provider can discuss this further.   Blanca last reviewed this educational content on 5/1/2022 © 2000-2023 [employer]. All rights reserved. This information is not intended as a substitute for professional medical care. Always follow your healthcare professional's instructions.      Call

## 2024-11-17 DIAGNOSIS — G47.00 INSOMNIA, UNSPECIFIED TYPE: ICD-10-CM

## 2024-11-18 RX ORDER — TRAZODONE HYDROCHLORIDE 50 MG/1
25 TABLET, FILM COATED ORAL NIGHTLY
Qty: 45 TABLET | Refills: 1 | Status: SHIPPED | OUTPATIENT
Start: 2024-11-18

## 2024-11-18 NOTE — TELEPHONE ENCOUNTER
Request for TRAZODONE 50 MG Oral Tab     No protocol     LOV 10/31/24 with    Last refill 12/21/23 - 45 tablets 3 refill   No future appointments.    Labs 10/17/23

## 2024-12-02 ENCOUNTER — TELEPHONE (OUTPATIENT)
Dept: FAMILY MEDICINE CLINIC | Facility: CLINIC | Age: 64
End: 2024-12-02

## 2024-12-02 NOTE — TELEPHONE ENCOUNTER
Scheduled   Future Appointments   Date Time Provider Department Center   12/30/2024  5:00 PM Loreto Leon MD [facility] [place]        none

## 2024-12-02 NOTE — TELEPHONE ENCOUNTER
Patient having surgery 1/20/25  Form in Dr Loreto Leon MD bin    Please call to schedule pre op    No future appointments.

## 2024-12-23 ENCOUNTER — OFFICE VISIT (OUTPATIENT)
Dept: FAMILY MEDICINE CLINIC | Facility: CLINIC | Age: 64
End: 2024-12-23
Payer: COMMERCIAL

## 2024-12-23 ENCOUNTER — HOSPITAL ENCOUNTER (OUTPATIENT)
Dept: GENERAL RADIOLOGY | Age: 64
Discharge: HOME | End: 2024-12-23
Attending: FAMILY MEDICINE
Payer: COMMERCIAL

## 2024-12-23 VITALS
BODY MASS INDEX: 28.71 KG/M2 | SYSTOLIC BLOOD PRESSURE: 138 MMHG | HEART RATE: 83 BPM | OXYGEN SATURATION: 98 % | DIASTOLIC BLOOD PRESSURE: 64 MMHG | TEMPERATURE: 97 F | WEIGHT: 212 LBS | HEIGHT: 72 IN

## 2024-12-23 DIAGNOSIS — R05.1 ACUTE COUGH: ICD-10-CM

## 2024-12-23 DIAGNOSIS — J40 BRONCHITIS: ICD-10-CM

## 2024-12-23 DIAGNOSIS — J01.00 ACUTE MAXILLARY SINUSITIS, RECURRENCE NOT SPECIFIED: Primary | ICD-10-CM

## 2024-12-23 PROCEDURE — 3008F BODY MASS INDEX DOCD: CPT | Performed by: FAMILY MEDICINE

## 2024-12-23 PROCEDURE — 3075F SYST BP GE 130 - 139MM HG: CPT | Performed by: FAMILY MEDICINE

## 2024-12-23 PROCEDURE — 99214 OFFICE O/P EST MOD 30 MIN: CPT | Performed by: FAMILY MEDICINE

## 2024-12-23 PROCEDURE — 87637 SARSCOV2&INF A&B&RSV AMP PRB: CPT | Performed by: FAMILY MEDICINE

## 2024-12-23 PROCEDURE — 3078F DIAST BP <80 MM HG: CPT | Performed by: FAMILY MEDICINE

## 2024-12-23 PROCEDURE — 71046 X-RAY EXAM CHEST 2 VIEWS: CPT | Performed by: FAMILY MEDICINE

## 2024-12-23 RX ORDER — ALBUTEROL SULFATE 90 UG/1
INHALANT RESPIRATORY (INHALATION)
COMMUNITY
Start: 2024-12-21

## 2024-12-23 RX ORDER — PREDNISONE 20 MG/1
20 TABLET ORAL 2 TIMES DAILY WITH MEALS
COMMUNITY
Start: 2024-12-21

## 2024-12-23 NOTE — PROGRESS NOTES
Chief Complaint   Patient presents with    Follow - Up     Immediate care f/u. Not feeling better         HPI:    Patient ID: Seven aLke is a 64 year old male.    HPI here for ic follow up.  He had cold last wk fever and chills and then cough started bad at night. He feels crackles. Albuterol and steroid given. He feels cough is still present. No chest xray was done. No sinus pressure. No congestion. No fever now. No testing done.    Review of Systems  Pos cold and cough.       Current Outpatient Medications   Medication Sig Dispense Refill    predniSONE 20 MG Oral Tab Take 1 tablet (20 mg total) by mouth 2 (two) times daily for 5 days. 10 tablet 0    azithromycin (ZITHROMAX Z-CLARKE) 250 MG Oral Tab Take 2 tablets (500 mg total) by mouth daily for 1 day, THEN 1 tablet (250 mg total) daily for 4 days. 6 tablet 0    guaiFENesin-codeine 100-10 MG/5ML Oral Solution Take 5 mL by mouth 3 (three) times daily as needed for cough. 118 mL 0    albuterol 108 (90 Base) MCG/ACT Inhalation Aero Soln Take 2 puffs (inhalation) every 4 to 6 hours (shortness of breath or wheezing)      predniSONE 20 MG Oral Tab Take 1 tablet (20 mg total) by mouth 2 (two) times daily with meals.      diclofenac 75 MG Oral Tab EC Take 1 tablet (75 mg total) by mouth as needed.      ustekinumab (STELARA) 90 MG/ML Subcutaneous Solution Prefilled Syringe injection Inject 1 syringe into the skin every 12 weeks 1 mL 1    Multiple Vitamins-Minerals (MULTIVITAL-M) Oral Tab Take by mouth.      Ascorbic Acid (VITAMIN C) 1000 MG Oral Tab Take 1 tablet (1,000 mg total) by mouth daily.      traZODone 50 MG Oral Tab Take 0.5 tablets (25 mg total) by mouth nightly. 45 tablet 1    ALPRAZolam (XANAX) 0.25 MG Oral Tab Take 1 tablet (0.25 mg total) by mouth 3 (three) times daily as needed for Anxiety. 15 tablet 0     Allergies:Allergies[1]    HISTORY:  Past Medical History:    Acute bronchitis    Acute bronchospasm    Anxiety state, unspecified    NO  LONGER AN ISSUE    Chest pain, unspecified    Essential hypertension, benign    High blood pressure    PT DENIES, NO MEDICATION/TREATMENT    Nonspecific abnormal electrocardiogram (ECG) (EKG)    Osteoarthritis    Other abnormal blood chemistry    Sleep disturbance, unspecified    Visual impairment    CONTACTS GLASSES      Past Surgical History:   Procedure Laterality Date    Colonoscopy N/A 12/14/2018    Procedure: COLONOSCOPY;  Surgeon: Brock Gillespie DO;  Location:  ENDOSCOPY    Other surgical history      rectal 7/23 10/23    Other surgical history  05/06/2024    ANAL EXAMINATION UNDER ANESTHESIA, FISTULOTOMY, SETON PLACEMENT      Family History   Problem Relation Age of Onset    Heart Disease Father     Cancer Other         Grandfather with Lung cancer    Stroke Other         Grandfather       Social History:   Social History     Socioeconomic History    Marital status:    Tobacco Use    Smoking status: Never    Smokeless tobacco: Never    Tobacco comments:     non-smoker   Vaping Use    Vaping status: Never Used   Substance and Sexual Activity    Alcohol use: No     Alcohol/week: 0.0 standard drinks of alcohol    Drug use: No     Social Drivers of Health      [facility]    Housing Stability        PHYSICAL EXAM:    /64 (BP Location: Right arm, Patient Position: Sitting, Cuff Size: adult)   Pulse 83   Temp 97.3 °F (36.3 °C) (Temporal)   Ht 6' (1.829 m)   Wt 212 lb (96.2 kg)   SpO2 98%   BMI 28.75 kg/m²    Physical Exam  Constitutional:       General: He is not in acute distress.     Appearance: He is not ill-appearing.   HENT:      Right Ear: Tympanic membrane normal.      Left Ear: Tympanic membrane normal.      Nose: Congestion and rhinorrhea present.      Mouth/Throat:      Pharynx: No oropharyngeal exudate or posterior oropharyngeal erythema.   Cardiovascular:      Rate and Rhythm: Normal rate and regular rhythm.       Pulses: Normal pulses.      Heart sounds: Normal heart sounds.   Pulmonary:      Effort: Pulmonary effort is normal.      Breath sounds: Normal breath sounds.   Skin:     General: Skin is warm.      Capillary Refill: Capillary refill takes less than 2 seconds.   Neurological:      Mental Status: He is alert.              ASSESSMENT/PLAN:   1. Acute maxillary sinusitis, recurrence not specified  Discuss xray result.  Meds started.  If sym no better in few days call office.     2. Acute cough  As above  - XR CHEST PA + LAT CHEST (CPT=71046); Future  - SARS-CoV-2/Flu A and B/RSV by PCR ([facility]) [E]; Future  - SARS-CoV-2/Flu A and B/RSV by PCR ([facility]) [E]  - guaiFENesin-codeine 100-10 MG/5ML Oral Solution; Take 5 mL by mouth 3 (three) times daily as needed for cough.  Dispense: 118 mL; Refill: 0    3. Bronchitis  stable             No follow-ups on file.        [1] No Known Allergies

## 2024-12-24 ENCOUNTER — TELEPHONE (OUTPATIENT)
Dept: FAMILY MEDICINE CLINIC | Facility: CLINIC | Age: 64
End: 2024-12-24

## 2024-12-24 RX ORDER — CODEINE PHOSPHATE AND GUAIFENESIN 10; 100 MG/5ML; MG/5ML
5 SOLUTION ORAL 3 TIMES DAILY PRN
Qty: 118 ML | Refills: 0 | Status: SHIPPED | OUTPATIENT
Start: 2024-12-24

## 2024-12-24 RX ORDER — AZITHROMYCIN 250 MG/1
TABLET, FILM COATED ORAL
Qty: 6 TABLET | Refills: 0 | Status: SHIPPED | OUTPATIENT
Start: 2024-12-24 | End: 2024-12-29

## 2024-12-24 RX ORDER — PREDNISONE 20 MG/1
20 TABLET ORAL 2 TIMES DAILY
Qty: 10 TABLET | Refills: 0 | Status: SHIPPED | OUTPATIENT
Start: 2024-12-24 | End: 2024-12-29

## 2024-12-25 LAB
FLUAV + FLUBV RNA SPEC NAA+PROBE: DETECTED
FLUAV + FLUBV RNA SPEC NAA+PROBE: NOT DETECTED
RSV RNA SPEC NAA+PROBE: NOT DETECTED
SARS-COV-2 RNA RESP QL NAA+PROBE: NOT DETECTED

## 2024-12-26 ENCOUNTER — TELEPHONE (OUTPATIENT)
Dept: FAMILY MEDICINE CLINIC | Facility: CLINIC | Age: 64
End: 2024-12-26

## 2024-12-30 ENCOUNTER — OFFICE VISIT (OUTPATIENT)
Dept: FAMILY MEDICINE CLINIC | Facility: CLINIC | Age: 64
End: 2024-12-30
Payer: COMMERCIAL

## 2024-12-30 VITALS
DIASTOLIC BLOOD PRESSURE: 72 MMHG | HEART RATE: 88 BPM | SYSTOLIC BLOOD PRESSURE: 134 MMHG | TEMPERATURE: 97 F | BODY MASS INDEX: 28.58 KG/M2 | WEIGHT: 211 LBS | OXYGEN SATURATION: 98 % | HEIGHT: 72 IN

## 2024-12-30 DIAGNOSIS — Z01.818 PREOP TESTING: Primary | ICD-10-CM

## 2024-12-30 DIAGNOSIS — Z12.11 SCREENING FOR COLON CANCER: ICD-10-CM

## 2024-12-30 PROCEDURE — 99213 OFFICE O/P EST LOW 20 MIN: CPT | Performed by: FAMILY MEDICINE

## 2024-12-30 PROCEDURE — 3008F BODY MASS INDEX DOCD: CPT | Performed by: FAMILY MEDICINE

## 2024-12-30 PROCEDURE — 93000 ELECTROCARDIOGRAM COMPLETE: CPT | Performed by: FAMILY MEDICINE

## 2024-12-30 PROCEDURE — 3078F DIAST BP <80 MM HG: CPT | Performed by: FAMILY MEDICINE

## 2024-12-30 PROCEDURE — 3075F SYST BP GE 130 - 139MM HG: CPT | Performed by: FAMILY MEDICINE

## 2024-12-30 NOTE — PROGRESS NOTES
Chief Complaint   Patient presents with    Pre-Op Exam     Pre op physical. Left knee replacement. Surgery date: 02/10/2025. Dr. Hagan, [facility]. Declined vaccines.        HPI:    Patient ID: Seven Lake is a 64 year old male.    HPI here for preope clearance.  Bronchitis better still has some cough.   Left knee replacement in feb 10 th done by Dr. Duffy.  No cardiac disease.  No pulmonary disease.  No renal disease.  No sleep apnea.  No anesthetic reaction in past.  No excessive bleeding in past.   He is taking diclofenac daily.         Review of Systems   Constitutional:  Negative for fever.   HENT:  Negative for congestion.    Respiratory:  Positive for cough. Negative for shortness of breath and wheezing.    Cardiovascular:  Negative for chest pain, palpitations and leg swelling.   Gastrointestinal:  Negative for abdominal pain, blood in stool, constipation and diarrhea.   Genitourinary:  Negative for difficulty urinating.   Neurological:  Negative for dizziness.           Current Outpatient Medications   Medication Sig Dispense Refill    guaiFENesin-codeine 100-10 MG/5ML Oral Solution Take 5 mL by mouth 3 (three) times daily as needed for cough. 118 mL 0    albuterol 108 (90 Base) MCG/ACT Inhalation Aero Soln Take 2 puffs (inhalation) every 4 to 6 hours (shortness of breath or wheezing)      predniSONE 20 MG Oral Tab Take 1 tablet (20 mg total) by mouth 2 (two) times daily with meals.      ALPRAZolam (XANAX) 0.25 MG Oral Tab Take 1 tablet (0.25 mg total) by mouth 3 (three) times daily as needed for Anxiety. 15 tablet 0    diclofenac 75 MG Oral Tab EC Take 1 tablet (75 mg total) by mouth as needed.      ustekinumab (STELARA) 90 MG/ML Subcutaneous Solution Prefilled Syringe injection Inject 1 syringe into the skin every 12 weeks 1 mL 1    Multiple Vitamins-Minerals (MULTIVITAL-M) Oral Tab Take by mouth.      Ascorbic Acid (VITAMIN C) 1000 MG Oral Tab Take 1 tablet (1,000 mg total) by  mouth daily.       Allergies:Allergies[1]    HISTORY:  Past Medical History:    Acute bronchitis    Acute bronchospasm    Anxiety state, unspecified    NO LONGER AN ISSUE    Chest pain, unspecified    Essential hypertension, benign    High blood pressure    PT DENIES, NO MEDICATION/TREATMENT    Nonspecific abnormal electrocardiogram (ECG) (EKG)    Osteoarthritis    Other abnormal blood chemistry    Sleep disturbance, unspecified    Visual impairment    CONTACTS GLASSES      Past Surgical History:   Procedure Laterality Date    Colonoscopy N/A 12/14/2018    Procedure: COLONOSCOPY;  Surgeon: Brock Gillespie DO;  Location:  ENDOSCOPY    Other surgical history      rectal 7/23 10/23    Other surgical history  05/06/2024    ANAL EXAMINATION UNDER ANESTHESIA, FISTULOTOMY, SETON PLACEMENT      Family History   Problem Relation Age of Onset    Heart Disease Father     Cancer Other         Grandfather with Lung cancer    Stroke Other         Grandfather       Social History:   Social History     Socioeconomic History    Marital status:    Tobacco Use    Smoking status: Never    Smokeless tobacco: Never    Tobacco comments:     non-smoker   Vaping Use    Vaping status: Never Used   Substance and Sexual Activity    Alcohol use: No     Alcohol/week: 0.0 standard drinks of alcohol    Drug use: No     Social Drivers of Health      [facility], [facility]    Housing Stability        PHYSICAL EXAM:    /72 (BP Location: Left arm, Patient Position: Sitting, Cuff Size: large)   Pulse 88   Temp 97.1 °F (36.2 °C) (Temporal)   Ht 6' (1.829 m)   Wt 211 lb (95.7 kg)   SpO2 98%   BMI 28.62 kg/m²    Physical Exam  Constitutional:       General: He is not in acute distress.     Appearance: He is not ill-appearing.   HENT:      Nose: Congestion and rhinorrhea present.   Cardiovascular:      Rate and Rhythm: Normal rate and regular rhythm.      Pulses: Normal pulses.       Heart sounds: Normal heart sounds.   Pulmonary:      Effort: Pulmonary effort is normal.      Breath sounds: Normal breath sounds.   Abdominal:      Palpations: Abdomen is soft.      Tenderness: There is no abdominal tenderness. There is no guarding or rebound.   Skin:     General: Skin is warm.   Neurological:      Mental Status: He is alert.            ASSESSMENT/PLAN:   1. Preop testing  Preop testing labs ordered EKG shows normal sinus rhythm with no ST changes    Pending approval after labs  - ELECTROCARDIOGRAM, COMPLETE    2. Screening for colon cancer  Referral given  - Referral to [facility] for Colonoscopy **Select a Doc to add to AVS**  - Surgery Referral - In Network             No follow-ups on file.        [1] No Known Allergies

## 2025-01-02 ENCOUNTER — TELEPHONE (OUTPATIENT)
Dept: FAMILY MEDICINE CLINIC | Facility: CLINIC | Age: 65
End: 2025-01-02

## 2025-01-07 LAB
ATRIAL RATE: 79 BPM
P AXIS: 46 DEGREES
P-R INTERVAL: 126 MS
Q-T INTERVAL: 352 MS
QRS DURATION: 74 MS
QTC CALCULATION (BEZET): 403 MS
R AXIS: 46 DEGREES
T AXIS: 65 DEGREES
VENTRICULAR RATE: 79 BPM

## 2025-01-08 ENCOUNTER — LAB ENCOUNTER (OUTPATIENT)
Dept: LAB | Age: 65
End: 2025-01-08
Attending: FAMILY MEDICINE
Payer: COMMERCIAL

## 2025-01-08 DIAGNOSIS — R17 ELEVATED BILIRUBIN: ICD-10-CM

## 2025-01-08 DIAGNOSIS — E78.5 HYPERLIPIDEMIA, UNSPECIFIED HYPERLIPIDEMIA TYPE: ICD-10-CM

## 2025-01-08 DIAGNOSIS — Z00.00 ANNUAL PHYSICAL EXAM: ICD-10-CM

## 2025-01-08 LAB
ALBUMIN SERPL-MCNC: 4.3 G/DL (ref 3.2–4.8)
ALBUMIN/GLOB SERPL: 1.7 {RATIO} (ref 1–2)
ALP LIVER SERPL-CCNC: 65 U/L
ALT SERPL-CCNC: 25 U/L
ANION GAP SERPL CALC-SCNC: 8 MMOL/L (ref 0–18)
AST SERPL-CCNC: 24 U/L (ref ?–34)
BILIRUB DIRECT SERPL-MCNC: 0.2 MG/DL (ref ?–0.3)
BILIRUB SERPL-MCNC: 0.9 MG/DL (ref 0.2–1.1)
BUN BLD-MCNC: 21 MG/DL (ref 9–23)
CALCIUM BLD-MCNC: 9.4 MG/DL (ref 8.7–10.4)
CHLORIDE SERPL-SCNC: 105 MMOL/L (ref 98–112)
CHOLEST SERPL-MCNC: 224 MG/DL (ref ?–200)
CO2 SERPL-SCNC: 27 MMOL/L (ref 21–32)
CREAT BLD-MCNC: 1.05 MG/DL
EGFRCR SERPLBLD CKD-EPI 2021: 80 ML/MIN/1.73M2 (ref 60–?)
ERYTHROCYTE [DISTWIDTH] IN BLOOD BY AUTOMATED COUNT: 13 %
FASTING PATIENT LIPID ANSWER: YES
FASTING STATUS PATIENT QL REPORTED: YES
GLOBULIN PLAS-MCNC: 2.5 G/DL (ref 2–3.5)
GLUCOSE BLD-MCNC: 106 MG/DL (ref 70–99)
HCT VFR BLD AUTO: 41.4 %
HDLC SERPL-MCNC: 53 MG/DL (ref 40–59)
HGB BLD-MCNC: 13.6 G/DL
LDLC SERPL CALC-MCNC: 157 MG/DL (ref ?–100)
MCH RBC QN AUTO: 29.1 PG (ref 26–34)
MCHC RBC AUTO-ENTMCNC: 32.9 G/DL (ref 31–37)
MCV RBC AUTO: 88.5 FL
NONHDLC SERPL-MCNC: 171 MG/DL (ref ?–130)
OSMOLALITY SERPL CALC.SUM OF ELEC: 293 MOSM/KG (ref 275–295)
PLATELET # BLD AUTO: 348 10(3)UL (ref 150–450)
POTASSIUM SERPL-SCNC: 4.6 MMOL/L (ref 3.5–5.1)
PROT SERPL-MCNC: 6.8 G/DL (ref 5.7–8.2)
RBC # BLD AUTO: 4.68 X10(6)UL
SODIUM SERPL-SCNC: 140 MMOL/L (ref 136–145)
TRIGL SERPL-MCNC: 81 MG/DL (ref 30–149)
TSI SER-ACNC: 2.38 UIU/ML (ref 0.55–4.78)
VLDLC SERPL CALC-MCNC: 16 MG/DL (ref 0–30)
WBC # BLD AUTO: 5.8 X10(3) UL (ref 4–11)

## 2025-01-08 PROCEDURE — 85027 COMPLETE CBC AUTOMATED: CPT

## 2025-01-08 PROCEDURE — 36415 COLL VENOUS BLD VENIPUNCTURE: CPT

## 2025-01-08 PROCEDURE — 80061 LIPID PANEL: CPT

## 2025-01-08 PROCEDURE — 82248 BILIRUBIN DIRECT: CPT

## 2025-01-08 PROCEDURE — 84443 ASSAY THYROID STIM HORMONE: CPT

## 2025-01-08 PROCEDURE — 80053 COMPREHEN METABOLIC PANEL: CPT

## 2025-01-13 ENCOUNTER — TELEPHONE (OUTPATIENT)
Dept: FAMILY MEDICINE CLINIC | Facility: CLINIC | Age: 65
End: 2025-01-13

## 2025-01-13 DIAGNOSIS — Z01.818 PREOP TESTING: Primary | ICD-10-CM

## 2025-01-14 ENCOUNTER — TELEPHONE (OUTPATIENT)
Dept: FAMILY MEDICINE CLINIC | Facility: CLINIC | Age: 65
End: 2025-01-14

## 2025-01-14 ENCOUNTER — LAB ENCOUNTER (OUTPATIENT)
Dept: LAB | Age: 65
End: 2025-01-14
Attending: FAMILY MEDICINE
Payer: COMMERCIAL

## 2025-01-14 DIAGNOSIS — E78.5 HYPERLIPIDEMIA, UNSPECIFIED HYPERLIPIDEMIA TYPE: Primary | ICD-10-CM

## 2025-01-14 DIAGNOSIS — Z01.818 PREOP TESTING: ICD-10-CM

## 2025-01-14 LAB
BILIRUB UR QL STRIP.AUTO: NEGATIVE
CLARITY UR REFRACT.AUTO: CLEAR
GLUCOSE UR STRIP.AUTO-MCNC: NORMAL MG/DL
KETONES UR STRIP.AUTO-MCNC: NEGATIVE MG/DL
LEUKOCYTE ESTERASE UR QL STRIP.AUTO: NEGATIVE
NITRITE UR QL STRIP.AUTO: NEGATIVE
PH UR STRIP.AUTO: 5.5 [PH] (ref 5–8)
PROT UR STRIP.AUTO-MCNC: NEGATIVE MG/DL
RBC UR QL AUTO: NEGATIVE
SP GR UR STRIP.AUTO: 1.02 (ref 1–1.03)
UROBILINOGEN UR STRIP.AUTO-MCNC: NORMAL MG/DL

## 2025-01-14 PROCEDURE — 81003 URINALYSIS AUTO W/O SCOPE: CPT | Performed by: FAMILY MEDICINE

## 2025-01-14 NOTE — TELEPHONE ENCOUNTER
Discuss labs with patient. Discuss healthy diet and exercise and heart scan. Hold off on meds for now till he gets heart scan. Repeat lipid in 3month. He is cleared for surgery    Can you fax his papers with EKG and labs

## 2025-04-14 ENCOUNTER — TELEPHONE (OUTPATIENT)
Dept: FAMILY MEDICINE CLINIC | Facility: CLINIC | Age: 65
End: 2025-04-14

## 2025-05-24 NOTE — TELEPHONE ENCOUNTER
LOV 8/11/20 for ear pain. Televisit on 6/1/21 for sinusitis. Last px done 6/25/20. Hypertension Medications Protocol [id number]/14/2021 06:48 AM   Appointment in past 6 or next 3 months Protocol Details    CMP or BMP in past 12 months     Last serum creatinine< 2.0      Future Appointments   Date Time Provider Juan Moore   7/13/2021  4:30 PM [facility] DERM SUTURE REMOVAL [facility]   10/12/2021  4:30 PM Jeanna Ronquillo MD [facility]     Please advise.
24-May-2025

## (undated) DEVICE — SUT VICRYL 2-0 UR-6 J602H

## (undated) DEVICE — SNARE CAPTIFLEX MICRO-OVL OLY

## (undated) DEVICE — SUT COAT VCRL+ 2-0 27IN UR-6 ABSRB VLT ANTIBA

## (undated) DEVICE — GLOVE SUR 7.5 SENSICARE PI PIP CRM PWD F

## (undated) DEVICE — SPONGE GZ W4XL8IN COT 12 PLY WVN

## (undated) DEVICE — Device: Brand: DEFENDO AIR/WATER/SUCTION AND BIOPSY VALVE

## (undated) DEVICE — NEEDLE CONTRAST INTERJECT 25G

## (undated) DEVICE — STERILE POLYISOPRENE POWDER-FREE SURGICAL GLOVES: Brand: PROTEXIS

## (undated) DEVICE — REM POLYHESIVE ADULT PATIENT RETURN ELECTRODE: Brand: VALLEYLAB

## (undated) DEVICE — SUT ETHBND XL 5 30IN CCS NABSRB GRN 48MM 1/2

## (undated) DEVICE — SUT CHRM GUT 2-0 27IN SH ABSRB UD 26MM 1/2

## (undated) DEVICE — SOLUTION IRRIG 1000ML 0.9% NACL USP BTL

## (undated) DEVICE — ENDOSCOPY PACK - LOWER: Brand: MEDLINE INDUSTRIES, INC.

## (undated) DEVICE — JELLY,LUBE,STERILE,FLIP TOP,TUBE,2-OZ: Brand: MEDLINE

## (undated) DEVICE — PANTS KNIT WASHABLE 2/3XL

## (undated) DEVICE — PACK CDS RECTAL

## (undated) DEVICE — SLEEVE KENDALL SCD EXPRESS MED

## (undated) DEVICE — RECTAL CDS-LF: Brand: MEDLINE INDUSTRIES, INC.

## (undated) DEVICE — SOLUTION  .9 1000ML BTL

## (undated) DEVICE — GAUZE,SPONGE,4"X4",12PLY,STERILE,LF,2'S: Brand: MEDLINE

## (undated) DEVICE — 1200CC GUARDIAN II: Brand: GUARDIAN

## (undated) DEVICE — UNDERPANTS INCONT 2XL KNIT MAT

## (undated) DEVICE — TRAP 4 CPTR CHMBR N EZ INLN

## (undated) DEVICE — GLOVE SURG TRIUMPH SZ 71/2

## (undated) DEVICE — ABDOMINAL PAD: Brand: DERMACEA

## (undated) DEVICE — PAD,ABDOMINAL,8"X7.5",ST,LF,20/BX: Brand: MEDLINE INDUSTRIES, INC.

## (undated) DEVICE — 3M™ RED DOT™ MONITORING ELECTRODE WITH FOAM TAPE AND STICKY GEL, 50/BAG, 20/CASE, 72/PLT 2570: Brand: RED DOT™

## (undated) DEVICE — SLEEVE COMPR MD KNEE LEN SGL USE KENDALL SCD

## (undated) DEVICE — LIGHT HANDLE

## (undated) DEVICE — SUTURE CHROMIC GUT SZ 2-0 L27IN ABSRB UD

## (undated) DEVICE — FILTERLINE NASAL ADULT O2/CO2

## (undated) DEVICE — UMBILICAL TAPE: Brand: DEROYAL

## (undated) DEVICE — BETADINE SOLUTION 8 OZ BTL

## (undated) DEVICE — 3M™ MICROPORE™ TAPE, 1530-2: Brand: 3M™ MICROPORE™

## (undated) DEVICE — SUT CHRM GUT 3-0 27IN SH ABSRB UD 26MM 1/2

## (undated) DEVICE — SPNG GZ W4XL4IN COT 12 PLY TYP

## (undated) DEVICE — SUPER SPONGES,MEDIUM: Brand: KERLIX

## (undated) DEVICE — INTENDED TO BE USED TO OCCLUDE, RETRACT AND IDENTIFY ARTERIES, VEINS, TENDONS AND NERVES IN SURGICAL PROCEDURES: Brand: STERION®  VESSEL LOOP

## (undated) DEVICE — SLEEVE COMPR M KNEE LEN SGL USE KENDALL SCD

## (undated) DEVICE — PAD ULNAR NERVE PROTECTOR

## (undated) NOTE — LETTER
23    Patient: Cheli Sauceda  : 1960 Visit date: 2023    Dear  Immanuel Burnett MD    Thank you for referring Cheli Sauceda to my practice. Please find my assessment and plan below. Assessment   Anal fistula    Plan   The patient presents for 6-week follow-up and continued care for a nonhealing left posterior lateral complex anal fistulotomy site. It has been approximately 1 year since his initial operation. At the patient's previous visit, I discovered on exam that he had a subcutaneous pocket of granulation tissue, so I opened the pocket with scissors, expose the granulation bed and applied silver nitrate. At today's visit, the patient states he thinks the wound has entirely healed. He states he wore gauze for several days following his last visit, but has not had any drainage or bleeding recently. He denies diarrhea or constipation. He has good control of gas and stool. Clinical exam was limited to external exam only. It reveals his left posterior lateral fistulotomy site to be entirely healed. There are no external hemorrhoids, fissures or abscesses. The patient's fistulotomy site is entirely healed at this time. He does not require any further follow-up with me. I will see him on an as-needed basis in the future.        Sincerely,     Ivett Chairez MD

## (undated) NOTE — LETTER
23    Patient: Irving Echeverria  : 1960 Visit date: 2023    Dear  Kaylene Barnes MD    Thank you for referring Irving Echeverria to my practice. Please find my assessment and plan below. Assessment   Nonhealing surgical wound, sequela  (primary encounter diagnosis)  Anal fistula    Plan   This patient presents for 6-week follow-up and continued care for a nonhealing left posterolateral complex anal fistulotomy site. It has been nearly a year since the original operation for the fistulotomy after an abscess was discovered in the left posterolateral aspect of the anal canal.    He states that is still dripping small amounts of blood. He denies any significant pain. He has not had any recurrence of the abscess. Clinical exam today reveals a small dot of drainage from the midportion of a 6 cm fistulotomy site in the left posterolateral aspect of the anal canal.  It turns out that this is the exit pinhole from a larger subcutaneous pocket of granulation tissue. I was able to open this up with sharp scissors in the office. This exposed the granulation bed. I applied silver nitrate. With the wound now confluent with the entire granulation bed, this should now regranulate from the base to the surface. This will allow total wound closure. This will also prevent reformation of the abscess cavity. I will see this patient again in 6 weeks for further care and treatment.      Sincerely,     Ashwin Guadarrama MD   CC:   No Recipients

## (undated) NOTE — LETTER
23    Patient: Mane Arreola  : 1960 Visit date: 2023    Dear  Chana Goodman MD    Thank you for referring Mane Arreola to my practice. Please find my assessment and plan below. Assessment   Anal fistula  (primary encounter diagnosis)  Nonhealing surgical wound, sequela    Plan   This patient continues to have a nonhealing wound at the site of a prior complex anal fistulotomy. His last fistulotomy treatment was in 2022, in the operating room. He continues to have some slight drainage at the site. He has no significant pain. He states he does have to wear a slight pad at times. He has no fever or chills. He has no nausea or vomiting. He has no diarrhea or constipation. He has had no evidence of recurrence of the anal abscess or fistula. Clinical exam including external exam reveals him to have a left posterolateral fistula tract that begins inside the anal canal at the anoderm and extends out the 10 o'clock position from the posterior midline. There is a 2 to 3 mm very small nonhealing section of the wound with a slight web over the top. I took the opportunity today to apply silver nitrate to this region. I did probe the region with a fistula S probe. It does not track anywhere, and is limited to the small little tiny space without a fistula. I will see his patient again in 6 weeks. I would like to see this wound completely closed.        Sincerely,     Daniele Figueredo MD

## (undated) NOTE — Clinical Note
I had the pleasure of seeing Jonna Singhcarie on 12/18/2023. Please see my attached note.   Yaima Lowe MD [facility]

## (undated) NOTE — MR AVS SNAPSHOT
[address] [place] [id number] [phone]               Thank you for choosing us for your health care visit with Linton Bosworth, DO.   We are glad to serve you and happy to provide you with this summary of your vi RD. [phone], [address] [place] [address]     Phone:  [phone]    - Fluticasone Propionate 50 MCG/ACT Susp  - methylPREDNISolone 4 MG Tbpk            MyChart     Visit MyChart  You can access your MyChart to more actively manage

## (undated) NOTE — LETTER
10/03/22    Patient: Jennifer Gama  : 1960 Visit date: 10/3/2022    Dear  Areli Chawla MD    Thank you for referring Jnenifer Gama to my practice. Please find my assessment and plan below. Assessment   Anal abscess  (primary encounter diagnosis)    Plan   This patient had an anal abscess drained on 2022. We did a thorough search for fistula at the time of operation. The abscess was in the left posterolateral aspect of the anal margin. The probe did pass all the way through the ischiorectal fossa toward the sphincter process, but did not penetrate through the sphincter. Patient presents at this time for follow-up care. He does see some green/brown drainage from the wound. He has no fever or chills. He has no pain. He does have intermittent drainage on a pad. He has good control of gas and stool. Clinical exam including digital rectal exam, excluding anoscopy, reveals him to have a pinpoint hole at the end of a very well-healed tract that goes from 5 cm away from the anal verge toward the posterior midline. Using an S probe, there is a well-formed fistula tract that goes virtually in line with the healing scar. I did not push the probe all the way into the anal canal, but it appears that there is now an obvious fistula. There are no external hemorrhoids. There are no fissures present. There is no abscess. There are no secondary tracts. I had a long conversation with the patient and his son. The son is an anesthesiologist at [address] [place] [address]. He will require a return to the operating room. He will undergo complex anal fistulotomy. He may require a two-stage procedure. If the fistula goes deep into the muscle, I cannot cut all muscle in a single operation. We would then place a seton suture, and plan a second operation. Many times in men, the second operation is a simple completion fistulotomy.     Rarely, this will take a mucosal advancement flap secondary to deep involvement of deeper sphincter muscles. We will know this at the first operation. We are currently scheduling him for October 10, 2022, at [facility] [place].        Sincerely,       Devan Murillo MD   CC: No Recipients

## (undated) NOTE — LETTER
23    Patient: Lauryn Del Rio  : 1960 Visit date: 3/21/2023    Dear  Junior Alyssa MD    Thank you for referring Lauryn Del Rio to my practice. Please find my assessment and plan below. Assessment   Anal fistula  (primary encounter diagnosis)  Nonhealing surgical wound, sequela    Plan   This patient has a nonhealing surgical wound after complex anal fistulotomy was performed. The patient had a seton placed in 2022. The seton worked its way out on its own without completion fistulotomy by December. He now has a wound that has taken its time to close. He has no fever or chills. Has no pain. He does have some very slight discharge. He is having normal bowel habits. He has not had any complications at the wound site other than a slight few drops of drainage. Clinical exam limited to external exam and digital rectal exam reveals him to have 4/4 sphincter tone at both basal and MAC squeeze pressures. There is a nonhealing portion of the long fistulotomy tract at the 11 o'clock position. It is a 4 cm tract, the middle 4 mm have not yet filled and healed. There is some slight granulation tissue. I took the opportunity today to apply silver nitrate. I will see this patient again in 2023.        Sincerely,       Devan Murillo MD   CC: No Recipients

## (undated) NOTE — LETTER
23    Patient: Irving Echeverria  : 1960 Visit date: 2023    Dear  Kaylene Barnes MD    Thank you for referring Irving Echeverria to my practice. Please find my assessment and plan below. Assessment   Postoperative state  Anal fistula  Pruritus ani    Plan   The patient presents for continued care and evaluation following a complex anal fistulotomy with seton placement on October 10, 2022. The patient was scheduled for seton removal earlier this year, but the seton fell out the day prior to his operation. The time of today's visit, he has no new complaints. He denies significant drainage at the site. He denies surrounding erythema or cellulitis. He is having regular bowel movements. He denies fevers or chills. Clinical exam reveals no external hemorrhoids, fissures. He does have evidence of pruritus ani. He does have a small pinpoint opening of his fistulotomy site in the left anterior lateral location. I took the opportunity at today's visit to apply silver nitrate to the wound bed. I discussed with the patient at today's visit that they have evidence of pruritus ani skin changes, and this is causing the irritation and itching that they are feeling perianally. I advised the patient on dietary and hygienic modifications. These include avoiding coffee, cola, chocolate, tea, beer, tomatoes, ketchup, and spicy foods. The patient was also advised to avoid over wiping. They should wipe once with toilet paper, once with Preparation H or Tucks wipes due to the which halle, then pat dry with toilet paper. If they must use more than they should have been the shower. They should remain on this diet for at least 1 month to allow the skin to heal.    I explained to the patient that we do have to see his wound entirely healed. I will see him again in approximately 1 month for wound recheck.   If needed, we will apply silver nitrate at that time to his wound bed.    The patient expressed understanding with the above plan.   All questions and concerns were addressed       Sincerely,       Daniele Figueredo MD   CC: No Recipients

## (undated) NOTE — LETTER
Patient Name: Davon Aviles  : 1960  MRN: [id number]  Patient Address: [address] [place] [address] [address] [place] [id number]      Coronavirus Disease 2019 (COVID-19)     [facility] is committed to the safety and well-being of our patients, jerardob carefully. If your symptoms get worse, call your healthcare provider immediately. 3. Get rest and stay hydrated.    4. If you have a medical appointment, call the healthcare provider ahead of time and tell them that you have or may have COVID-19.  5. For m of fever-reducing medications; and  · Improvement in respiratory symptoms (e.g., cough, shortness of breath); and  · At least 10 days have passed since symptoms first appeared OR if asymptomatic patient or date of symptom onset is unclear then use 10 days donors must:    · Have had a confirmed diagnosis of COVID-19  · Be symptom-free for at least 14 days*    *Some people will be required to have a repeat COVID-19 test in order to be eligible to donate.  If you’re instructed by [facility] that a repeat test is r random. Researchers are trying to identify similarities between people with a Post-COVID condition to better understand if there are risk factors. How do I prevent a Post-COVID condition?   The best way to prevent the long-term symptoms of COVID-19 is

## (undated) NOTE — LETTER
22    Patient: Margert Ta  : 1960 Visit date: 2022    Dear  uL Miranda MD    Thank you for referring Margret Ta to my practice. Please find my assessment and plan below. Assessment   Anal abscess  (primary encounter diagnosis)      Plan   This patient has had 8 months of symptoms. He states that he had pain on the left side of the rectum and pelvis. It was \"deep down\". It was worse when he was driving. 1 month ago, he felt that the area was getting swollen. He states that he was showing a bump on his butt. He still felt it was deep in the tissues. He underwent ultrasound examination elsewhere, ultimately CT scan at our institution. Ultrasound elsewhere revealed an abscess that he states was 3 cm. I have personally reviewed the CT scan performed at [facility] performed on 2022. The CT shows that there is abscess pocket has grown to 11.9 cm. It remains within the subcutaneous tissues of the left lateral buttock extending superiorly into the perirectal space, below the levator ani musculature, but definitely following an extra sphincteric pattern. There is no surrounding inflammation of the tissue. There is some slight thickening of the rectum at that side of the buttock. There are no air bubbles within this fluid collection. Please see the radiologist findings for any other incidentals of the CT scan of the pelvis. The patient has no fever or chills. He has had symptoms for a full 8 months. He is having 6/10 pain at the site. There is pain that is 24 hours a day, and not related to defecation. He feels no obstruction to defecation. He neither has diarrhea nor constipation. He has no bright red blood per rectum. He has no melena. He has no mucus in the stool or narrowing of the stool. He has no associated abdominal pain or distention. He has not suffered weight loss as a symptom.     He has no family history of colon or rectal cancer. He has never had a prior anal rectal abscess, or anal fistula. He has not had any surgery on the anus or anal canal.  He has never had prior symptoms or surgery for hemorrhoids. He has never been diagnosed with Crohn disease, ulcerative colitis, or irritable bowel syndrome. He is not taking any blood thinners. He has never had heart attack, stroke, heart murmur, heart valve replacement, or cardiac arrhythmia. Clinical exam including anoscopy reveals him to have a very large palpable fluid collection deep in the tissues of the left ischiorectal fossa that can extend above my fingertip on digital rectal exam in the left side of the anal canal.  It definitely gets above the sphincter complex. There is no sign of proctitis or Crohn's disease. There is no evidence of internal fistula. There is no blood or pus on the examining finger. I can compress the fluid collection without exuding any purulent material in the anus. There are no significant external or internal hemorrhoids present. There is definite stricture of the anal canal based on this large compressive fluid collection in the left lateral aspect of the buttock. Prostate is normal.    This patient will require drainage of this apparent abscess that has been present most likely to some degree for the last 8 months. We will be taking him to the operating room on an urgent basis tomorrow. Most likely we will simply drain this very large fluid collection. We may have to put in some type of a drain or Malecot catheter. Most likely this will be an outpatient procedure done under general anesthesia. I talked to the patient's son who is an emergency medicine resident in [place]. I talked fully with the patient himself on FaceTime with the son. I described the procedures necessary to both of them.   I told them that if we find a fistula, we most likely will place a seton suture to provide drainage until the abscess pocket closes. If we do not find a fistula, we will simply evacuate this large fluid collection and allow this site to hopefully close on its own. I definitely described that there will be a large cavity palpable in the left lateral buttock. This will take at least 8 to 10 weeks to heal.    If there is a seton suture placed through a fistula tract, it may require another major procedure to close the fistula tract, including secondary fistulotomy, or mucosal advancement flap. If no fistula is identified at the first operation, sometimes this will heal down to a fine point, and a fistula will become obvious. This then would also require 1 or 2 operations to fix. Etiology of this fluid collection remains unclear, but the most likely source is a standard crypto glandular abscess that started with the fistula. The fistula may have long since healed since he has had symptoms for 8 months.         Sincerely,       Santi Albright MD   CC: No Recipients

## (undated) NOTE — Clinical Note
Nereida Schaeffer in the office today, he presents for colonoscopy. Patient underwent colonoscopy by me 5 years ago with removal of a cecal polyp. He is currently scheduled.   Thank you Rachna Beavers